# Patient Record
Sex: FEMALE | Race: WHITE | Employment: UNEMPLOYED | URBAN - METROPOLITAN AREA
[De-identification: names, ages, dates, MRNs, and addresses within clinical notes are randomized per-mention and may not be internally consistent; named-entity substitution may affect disease eponyms.]

---

## 2018-08-13 ENCOUNTER — APPOINTMENT (OUTPATIENT)
Dept: CARDIAC CATH/INVASIVE PROCEDURES | Age: 78
DRG: 246 | End: 2018-08-13
Payer: MEDICARE

## 2018-08-13 ENCOUNTER — APPOINTMENT (OUTPATIENT)
Dept: GENERAL RADIOLOGY | Age: 78
DRG: 246 | End: 2018-08-13
Payer: MEDICARE

## 2018-08-13 ENCOUNTER — HOSPITAL ENCOUNTER (INPATIENT)
Age: 78
LOS: 4 days | Discharge: HOME HEALTH | DRG: 246 | End: 2018-08-17
Attending: EMERGENCY MEDICINE | Admitting: INTERNAL MEDICINE
Payer: MEDICARE

## 2018-08-13 DIAGNOSIS — I25.9 CHEST PAIN DUE TO MYOCARDIAL ISCHEMIA, UNSPECIFIED ISCHEMIC CHEST PAIN TYPE: ICD-10-CM

## 2018-08-13 DIAGNOSIS — R94.31 ST SEGMENT DEPRESSION: ICD-10-CM

## 2018-08-13 DIAGNOSIS — I48.0 PAROXYSMAL ATRIAL FIBRILLATION (HCC): ICD-10-CM

## 2018-08-13 DIAGNOSIS — I21.4 NSTEMI (NON-ST ELEVATED MYOCARDIAL INFARCTION) (HCC): Primary | ICD-10-CM

## 2018-08-13 DIAGNOSIS — R94.31 ST ELEVATION: ICD-10-CM

## 2018-08-13 LAB
A/G RATIO: 1.6 (ref 1.1–2.2)
ABO/RH: NORMAL
ALBUMIN SERPL-MCNC: 3.8 G/DL (ref 3.4–5)
ALP BLD-CCNC: 101 U/L (ref 40–129)
ALT SERPL-CCNC: 9 U/L (ref 10–40)
ANION GAP SERPL CALCULATED.3IONS-SCNC: 16 MMOL/L (ref 3–16)
ANTIBODY SCREEN: NORMAL
APTT: 37.4 SEC (ref 26–36)
AST SERPL-CCNC: 14 U/L (ref 15–37)
BASOPHILS ABSOLUTE: 0.1 K/UL (ref 0–0.2)
BASOPHILS RELATIVE PERCENT: 0.8 %
BILIRUB SERPL-MCNC: 0.7 MG/DL (ref 0–1)
BUN BLDV-MCNC: 52 MG/DL (ref 7–20)
CALCIUM SERPL-MCNC: 10 MG/DL (ref 8.3–10.6)
CHLORIDE BLD-SCNC: 99 MMOL/L (ref 99–110)
CO2: 26 MMOL/L (ref 21–32)
CREAT SERPL-MCNC: 6.1 MG/DL (ref 0.6–1.2)
EKG ATRIAL RATE: 111 BPM
EKG ATRIAL RATE: 90 BPM
EKG DIAGNOSIS: NORMAL
EKG DIAGNOSIS: NORMAL
EKG P AXIS: 37 DEGREES
EKG P-R INTERVAL: 162 MS
EKG Q-T INTERVAL: 338 MS
EKG Q-T INTERVAL: 374 MS
EKG QRS DURATION: 92 MS
EKG QRS DURATION: 94 MS
EKG QTC CALCULATION (BAZETT): 451 MS
EKG QTC CALCULATION (BAZETT): 457 MS
EKG R AXIS: -25 DEGREES
EKG R AXIS: -28 DEGREES
EKG T AXIS: 112 DEGREES
EKG T AXIS: 145 DEGREES
EKG VENTRICULAR RATE: 107 BPM
EKG VENTRICULAR RATE: 90 BPM
EOSINOPHILS ABSOLUTE: 0.7 K/UL (ref 0–0.6)
EOSINOPHILS RELATIVE PERCENT: 8.6 %
GFR AFRICAN AMERICAN: 8
GFR NON-AFRICAN AMERICAN: 7
GLOBULIN: 2.4 G/DL
GLUCOSE BLD-MCNC: 147 MG/DL (ref 70–99)
GLUCOSE BLD-MCNC: 90 MG/DL (ref 70–99)
HCT VFR BLD CALC: 30.2 % (ref 36–48)
HEMOGLOBIN: 10.2 G/DL (ref 12–16)
INR BLD: 1.8 (ref 0.86–1.14)
LYMPHOCYTES ABSOLUTE: 0.4 K/UL (ref 1–5.1)
LYMPHOCYTES RELATIVE PERCENT: 5 %
MAGNESIUM: 2.3 MG/DL (ref 1.8–2.4)
MCH RBC QN AUTO: 32.1 PG (ref 26–34)
MCHC RBC AUTO-ENTMCNC: 33.6 G/DL (ref 31–36)
MCV RBC AUTO: 95.3 FL (ref 80–100)
MONOCYTES ABSOLUTE: 0.6 K/UL (ref 0–1.3)
MONOCYTES RELATIVE PERCENT: 7.4 %
NEUTROPHILS ABSOLUTE: 6.8 K/UL (ref 1.7–7.7)
NEUTROPHILS RELATIVE PERCENT: 78.2 %
PDW BLD-RTO: 14 % (ref 12.4–15.4)
PERFORMED ON: NORMAL
PLATELET # BLD: 183 K/UL (ref 135–450)
PMV BLD AUTO: 9.3 FL (ref 5–10.5)
POC ACT LR: 161 SEC
POC ACT LR: >400 SEC
POTASSIUM SERPL-SCNC: 5.4 MMOL/L (ref 3.5–5.1)
PROTHROMBIN TIME: 20.5 SEC (ref 9.8–13)
RBC # BLD: 3.17 M/UL (ref 4–5.2)
SODIUM BLD-SCNC: 141 MMOL/L (ref 136–145)
TOTAL PROTEIN: 6.2 G/DL (ref 6.4–8.2)
TROPONIN: 0.04 NG/ML
WBC # BLD: 8.7 K/UL (ref 4–11)

## 2018-08-13 PROCEDURE — 36415 COLL VENOUS BLD VENIPUNCTURE: CPT

## 2018-08-13 PROCEDURE — 99291 CRITICAL CARE FIRST HOUR: CPT | Performed by: INTERNAL MEDICINE

## 2018-08-13 PROCEDURE — B2111ZZ FLUOROSCOPY OF MULTIPLE CORONARY ARTERIES USING LOW OSMOLAR CONTRAST: ICD-10-PCS | Performed by: INTERNAL MEDICINE

## 2018-08-13 PROCEDURE — C1725 CATH, TRANSLUMIN NON-LASER: HCPCS

## 2018-08-13 PROCEDURE — 86900 BLOOD TYPING SEROLOGIC ABO: CPT

## 2018-08-13 PROCEDURE — 2580000003 HC RX 258: Performed by: INTERNAL MEDICINE

## 2018-08-13 PROCEDURE — C1769 GUIDE WIRE: HCPCS

## 2018-08-13 PROCEDURE — 93005 ELECTROCARDIOGRAM TRACING: CPT | Performed by: EMERGENCY MEDICINE

## 2018-08-13 PROCEDURE — 86850 RBC ANTIBODY SCREEN: CPT

## 2018-08-13 PROCEDURE — 2060000000 HC ICU INTERMEDIATE R&B

## 2018-08-13 PROCEDURE — 96374 THER/PROPH/DIAG INJ IV PUSH: CPT

## 2018-08-13 PROCEDURE — 6370000000 HC RX 637 (ALT 250 FOR IP): Performed by: INTERNAL MEDICINE

## 2018-08-13 PROCEDURE — 85347 COAGULATION TIME ACTIVATED: CPT

## 2018-08-13 PROCEDURE — 71045 X-RAY EXAM CHEST 1 VIEW: CPT

## 2018-08-13 PROCEDURE — 80074 ACUTE HEPATITIS PANEL: CPT

## 2018-08-13 PROCEDURE — 6360000002 HC RX W HCPCS

## 2018-08-13 PROCEDURE — 96375 TX/PRO/DX INJ NEW DRUG ADDON: CPT

## 2018-08-13 PROCEDURE — 6360000002 HC RX W HCPCS: Performed by: EMERGENCY MEDICINE

## 2018-08-13 PROCEDURE — 83735 ASSAY OF MAGNESIUM: CPT

## 2018-08-13 PROCEDURE — 85610 PROTHROMBIN TIME: CPT

## 2018-08-13 PROCEDURE — 99153 MOD SED SAME PHYS/QHP EA: CPT

## 2018-08-13 PROCEDURE — 4A023N7 MEASUREMENT OF CARDIAC SAMPLING AND PRESSURE, LEFT HEART, PERCUTANEOUS APPROACH: ICD-10-PCS | Performed by: INTERNAL MEDICINE

## 2018-08-13 PROCEDURE — 2709999900 HC NON-CHARGEABLE SUPPLY

## 2018-08-13 PROCEDURE — 93458 L HRT ARTERY/VENTRICLE ANGIO: CPT

## 2018-08-13 PROCEDURE — 93458 L HRT ARTERY/VENTRICLE ANGIO: CPT | Performed by: INTERNAL MEDICINE

## 2018-08-13 PROCEDURE — 2580000003 HC RX 258: Performed by: EMERGENCY MEDICINE

## 2018-08-13 PROCEDURE — 99152 MOD SED SAME PHYS/QHP 5/>YRS: CPT

## 2018-08-13 PROCEDURE — 90937 HEMODIALYSIS REPEATED EVAL: CPT

## 2018-08-13 PROCEDURE — 94761 N-INVAS EAR/PLS OXIMETRY MLT: CPT

## 2018-08-13 PROCEDURE — 02703ZZ DILATION OF CORONARY ARTERY, ONE ARTERY, PERCUTANEOUS APPROACH: ICD-10-PCS | Performed by: INTERNAL MEDICINE

## 2018-08-13 PROCEDURE — 85730 THROMBOPLASTIN TIME PARTIAL: CPT

## 2018-08-13 PROCEDURE — C1887 CATHETER, GUIDING: HCPCS

## 2018-08-13 PROCEDURE — 86901 BLOOD TYPING SEROLOGIC RH(D): CPT

## 2018-08-13 PROCEDURE — 80053 COMPREHEN METABOLIC PANEL: CPT

## 2018-08-13 PROCEDURE — 84443 ASSAY THYROID STIM HORMONE: CPT

## 2018-08-13 PROCEDURE — 2700000000 HC OXYGEN THERAPY PER DAY

## 2018-08-13 PROCEDURE — C1894 INTRO/SHEATH, NON-LASER: HCPCS

## 2018-08-13 PROCEDURE — 84484 ASSAY OF TROPONIN QUANT: CPT

## 2018-08-13 PROCEDURE — 85025 COMPLETE CBC W/AUTO DIFF WBC: CPT

## 2018-08-13 PROCEDURE — B2151ZZ FLUOROSCOPY OF LEFT HEART USING LOW OSMOLAR CONTRAST: ICD-10-PCS | Performed by: INTERNAL MEDICINE

## 2018-08-13 PROCEDURE — 99223 1ST HOSP IP/OBS HIGH 75: CPT | Performed by: THORACIC SURGERY (CARDIOTHORACIC VASCULAR SURGERY)

## 2018-08-13 PROCEDURE — 99152 MOD SED SAME PHYS/QHP 5/>YRS: CPT | Performed by: INTERNAL MEDICINE

## 2018-08-13 PROCEDURE — 6370000000 HC RX 637 (ALT 250 FOR IP): Performed by: EMERGENCY MEDICINE

## 2018-08-13 PROCEDURE — 99285 EMERGENCY DEPT VISIT HI MDM: CPT

## 2018-08-13 RX ORDER — INSULIN GLARGINE 100 [IU]/ML
12 INJECTION, SOLUTION SUBCUTANEOUS NIGHTLY
Status: DISCONTINUED | OUTPATIENT
Start: 2018-08-13 | End: 2018-08-17 | Stop reason: HOSPADM

## 2018-08-13 RX ORDER — ONDANSETRON 2 MG/ML
4 INJECTION INTRAMUSCULAR; INTRAVENOUS ONCE
Status: COMPLETED | OUTPATIENT
Start: 2018-08-13 | End: 2018-08-13

## 2018-08-13 RX ORDER — SODIUM CHLORIDE 0.9 % (FLUSH) 0.9 %
10 SYRINGE (ML) INJECTION PRN
Status: DISCONTINUED | OUTPATIENT
Start: 2018-08-13 | End: 2018-08-16 | Stop reason: SDUPTHER

## 2018-08-13 RX ORDER — ASPIRIN 81 MG/1
324 TABLET, CHEWABLE ORAL ONCE
Status: COMPLETED | OUTPATIENT
Start: 2018-08-13 | End: 2018-08-13

## 2018-08-13 RX ORDER — ATORVASTATIN CALCIUM 80 MG/1
80 TABLET, FILM COATED ORAL DAILY
Status: DISCONTINUED | OUTPATIENT
Start: 2018-08-13 | End: 2018-08-17 | Stop reason: HOSPADM

## 2018-08-13 RX ORDER — CINACALCET 30 MG/1
30 TABLET, FILM COATED ORAL DAILY
COMMUNITY

## 2018-08-13 RX ORDER — NICOTINE POLACRILEX 4 MG
15 LOZENGE BUCCAL PRN
Status: DISCONTINUED | OUTPATIENT
Start: 2018-08-13 | End: 2018-08-17 | Stop reason: HOSPADM

## 2018-08-13 RX ORDER — CINACALCET 30 MG/1
30 TABLET, FILM COATED ORAL DAILY
Status: DISCONTINUED | OUTPATIENT
Start: 2018-08-13 | End: 2018-08-17 | Stop reason: HOSPADM

## 2018-08-13 RX ORDER — HEPARIN SODIUM 5000 [USP'U]/ML
5000 INJECTION, SOLUTION INTRAVENOUS; SUBCUTANEOUS EVERY 8 HOURS SCHEDULED
Status: DISCONTINUED | OUTPATIENT
Start: 2018-08-14 | End: 2018-08-16

## 2018-08-13 RX ORDER — SEVELAMER CARBONATE 800 MG/1
800 TABLET, FILM COATED ORAL 2 TIMES DAILY WITH MEALS
Status: DISCONTINUED | OUTPATIENT
Start: 2018-08-13 | End: 2018-08-17 | Stop reason: HOSPADM

## 2018-08-13 RX ORDER — DEXTROSE MONOHYDRATE 50 MG/ML
100 INJECTION, SOLUTION INTRAVENOUS PRN
Status: DISCONTINUED | OUTPATIENT
Start: 2018-08-13 | End: 2018-08-17 | Stop reason: HOSPADM

## 2018-08-13 RX ORDER — 0.9 % SODIUM CHLORIDE 0.9 %
250 INTRAVENOUS SOLUTION INTRAVENOUS ONCE
Status: COMPLETED | OUTPATIENT
Start: 2018-08-13 | End: 2018-08-13

## 2018-08-13 RX ORDER — MORPHINE SULFATE 4 MG/ML
4 INJECTION, SOLUTION INTRAMUSCULAR; INTRAVENOUS ONCE
Status: COMPLETED | OUTPATIENT
Start: 2018-08-13 | End: 2018-08-13

## 2018-08-13 RX ORDER — SODIUM CHLORIDE 0.9 % (FLUSH) 0.9 %
10 SYRINGE (ML) INJECTION EVERY 12 HOURS SCHEDULED
Status: DISCONTINUED | OUTPATIENT
Start: 2018-08-13 | End: 2018-08-16 | Stop reason: SDUPTHER

## 2018-08-13 RX ORDER — ONDANSETRON 2 MG/ML
4 INJECTION INTRAMUSCULAR; INTRAVENOUS EVERY 6 HOURS PRN
Status: DISCONTINUED | OUTPATIENT
Start: 2018-08-13 | End: 2018-08-17 | Stop reason: HOSPADM

## 2018-08-13 RX ORDER — HYDROXYZINE PAMOATE 25 MG/1
25 CAPSULE ORAL NIGHTLY PRN
Status: DISCONTINUED | OUTPATIENT
Start: 2018-08-14 | End: 2018-08-17 | Stop reason: HOSPADM

## 2018-08-13 RX ORDER — ACETAMINOPHEN 325 MG/1
650 TABLET ORAL EVERY 4 HOURS PRN
Status: DISCONTINUED | OUTPATIENT
Start: 2018-08-13 | End: 2018-08-17 | Stop reason: HOSPADM

## 2018-08-13 RX ORDER — UREA 10 %
3 LOTION (ML) TOPICAL NIGHTLY PRN
Status: DISCONTINUED | OUTPATIENT
Start: 2018-08-14 | End: 2018-08-17 | Stop reason: HOSPADM

## 2018-08-13 RX ORDER — ATORVASTATIN CALCIUM 80 MG/1
80 TABLET, FILM COATED ORAL DAILY
Status: ON HOLD | COMMUNITY
End: 2018-08-17 | Stop reason: HOSPADM

## 2018-08-13 RX ORDER — PANTOPRAZOLE SODIUM 40 MG/1
40 TABLET, DELAYED RELEASE ORAL DAILY
Status: DISCONTINUED | OUTPATIENT
Start: 2018-08-13 | End: 2018-08-17 | Stop reason: HOSPADM

## 2018-08-13 RX ORDER — LOSARTAN POTASSIUM 25 MG/1
25 TABLET ORAL DAILY
Status: DISCONTINUED | OUTPATIENT
Start: 2018-08-13 | End: 2018-08-17 | Stop reason: HOSPADM

## 2018-08-13 RX ORDER — DEXTROSE MONOHYDRATE 25 G/50ML
12.5 INJECTION, SOLUTION INTRAVENOUS PRN
Status: DISCONTINUED | OUTPATIENT
Start: 2018-08-13 | End: 2018-08-17 | Stop reason: HOSPADM

## 2018-08-13 RX ORDER — LOSARTAN POTASSIUM 25 MG/1
25 TABLET ORAL DAILY
Status: ON HOLD | COMMUNITY
End: 2018-08-17 | Stop reason: HOSPADM

## 2018-08-13 RX ADMIN — MORPHINE SULFATE 4 MG: 4 INJECTION INTRAVENOUS at 07:17

## 2018-08-13 RX ADMIN — ACETAMINOPHEN 650 MG: 325 TABLET, FILM COATED ORAL at 17:31

## 2018-08-13 RX ADMIN — SODIUM CHLORIDE, PRESERVATIVE FREE 10 ML: 5 INJECTION INTRAVENOUS at 22:20

## 2018-08-13 RX ADMIN — ONDANSETRON 4 MG: 2 INJECTION INTRAMUSCULAR; INTRAVENOUS at 07:18

## 2018-08-13 RX ADMIN — SODIUM CHLORIDE 250 ML: 9 INJECTION, SOLUTION INTRAVENOUS at 07:18

## 2018-08-13 RX ADMIN — PANTOPRAZOLE SODIUM 40 MG: 40 TABLET, DELAYED RELEASE ORAL at 22:20

## 2018-08-13 RX ADMIN — LOSARTAN POTASSIUM 25 MG: 25 TABLET, FILM COATED ORAL at 22:19

## 2018-08-13 RX ADMIN — NITROGLYCERIN 1 INCH: 20 OINTMENT TOPICAL at 07:17

## 2018-08-13 RX ADMIN — ASPIRIN 81 MG 324 MG: 81 TABLET ORAL at 07:18

## 2018-08-13 RX ADMIN — INSULIN GLARGINE 12 UNITS: 100 INJECTION, SOLUTION SUBCUTANEOUS at 22:19

## 2018-08-13 RX ADMIN — ATORVASTATIN CALCIUM 80 MG: 80 TABLET, FILM COATED ORAL at 22:19

## 2018-08-13 ASSESSMENT — ENCOUNTER SYMPTOMS
BACK PAIN: 1
VOMITING: 0
CHEST TIGHTNESS: 1
COLOR CHANGE: 0
NAUSEA: 1
DIARRHEA: 0
ABDOMINAL PAIN: 0
SHORTNESS OF BREATH: 1

## 2018-08-13 ASSESSMENT — PAIN SCALES - GENERAL
PAINLEVEL_OUTOF10: 5
PAINLEVEL_OUTOF10: 8

## 2018-08-13 NOTE — PROGRESS NOTES
Patient admitted to room 439 from the cath lab. Patient's VSS, and R groin cath site is WNL, no redness, swelling or bleeding noted at this time. Patient oriented to room and unit. Family at bedside. Valuables given to patient's son to take home with him. Will continue to monitor.

## 2018-08-13 NOTE — CONSULTS
Department of Cardiovascular & Thoracic Surgery  History and Physical          DIAGNOSIS:  CAD with atypical angina. NSTEMI    CHIEF COMPLAINT:    Chief Complaint   Patient presents with    Shortness of Breath     Dialysis patient due for dialysis today. Woke up approx. 330am with SOB with low back pain and diaporesis. Nausea with no emesis, Sats 82% on RA upon arrival       History Obtained From:  patient, electronic medical record    HISTORY OF PRESENT ILLNESS:      The patient is a [de-identified] y.o. female with significant past medical history of DM, HTN and atrial fibrillation who presents with burning in her upper back and numbness down both arms. No chest pain. She developed diaphoresis without fever and then nausea and vomiting. These symptoms woke her up this am so she came to the ED where evaluation has shown EKG and enzyme changes c/w NSTEMI. LHC shows multi-vessel disease not amenable to PCI so I have been asked to see her regarding CABG surgery. Past Medical History:        Diagnosis Date    [facility]     Diabetes mellitus ([facility] [facility]     GERD (gastroesophageal reflux disease)     Hypertension     Renal failure (ARF), acute on chronic (HCC)     Thyroid disease     thyroid removed.        Past Surgical History:        Procedure Laterality Date    CHOLECYSTECTOMY      DIALYSIS FISTULA CREATION      left upper arm    EYE SURGERY      cataracts, retinal detatched, fluid removal    THYROID SURGERY         Medications Prior to Admission:   Prescriptions Prior to Admission: cinacalcet (SENSIPAR) 30 MG tablet, Take 30 mg by mouth daily  losartan (COZAAR) 25 MG tablet, Take 25 mg by mouth daily  atorvastatin (LIPITOR) 80 MG tablet, Take 80 mg by mouth daily  insulin glargine (LANTUS) 100 UNIT/ML injection vial, Inject 12 Units into the skin nightly   hydrALAZINE (APRESOLINE) 25 MG tablet, Take 50 mg by mouth 3 times daily 50 in am, 25 in afternoon and 50 in pm  sevelamer (RENVELA) 800 MG tablet, Take 1 tablet
Patient seen and examined, consult note dictated. Assessment and Plan:    1- ESRD: We will arrange for hemodialysis today per [facility] schedule. 2- Hyperkalemia: Mild, will treat medically pending dialysis as above. 3- HTN: Blood pressure within acceptable range. 4- Anemia: Stable hemoglobin, monitor. 5- CAD: Management per cardiology and CTS.
APPEARANCE:  The patient is alert and oriented x3, not in acute  distress. HEENT:  Eyes revealed normal conjunctivae and reactive pupils. NECK:  Revealed midline trachea and nonpalpable thyroid. LUNGS:  Clear to auscultation bilaterally, nonlabored breathing. CARDIOVASCULAR EXAM:  Revealed S1 and S2, regular rate and rhythm. No  murmurs or rubs. No peripheral edema. ABDOMINAL EXAM:  Revealed soft abdomen. No organomegaly. SKIN:  Revealed no lesions or rashes. Warm to touch. PSYCHIATRIC:  Revealed good judgment and insight. LYMPHATICS:  Revealed no cervical or axillary adenopathies. ASSESSMENT AND PLAN:  1. End-stage renal disease. We will arrange for hemodialysis today per  Monday, Wednesday, Friday schedule. 2.  Hyperkalemia mild, we will treat medically pending dialysis as above. 3.  Hypertension. Blood pressure within acceptable range. 4.  Anemia. Stable hemoglobin, monitor. 5.  Coronary artery disease, management per cardiology and cardiothoracic  surgery.         Trudy Araujo MD    D: 08/13/2018 15:03:36       T: 08/13/2018 15:05:26     [email or web address]  Job#: [id number]     Doc#: [id number]    CC:

## 2018-08-13 NOTE — ED PROVIDER NOTES
[address] [address]  eMERGENCY dEPARTMENT eNCOUnter        Pt Name: Emma Do  MRN: [id number]  Gretta 1940  Date of evaluation: 8/13/2018  Provider: Adriana Marques MD  PCP: Louis Phipps MD      [address] [place] [address]       Chief Complaint   Patient presents with    Shortness of Breath     Dialysis patient due for dialysis today. Woke up approx. 330am with SOB with low back pain and diaporesis. Nausea with no emesis, Sats 82% on RA upon arrival       HISTORY OF PRESENT ILLNESS   (Location/Symptom, Timing/Onset, Context/Setting, Quality, Duration, Modifying Factors, Severity)  Note limiting factors. Emma Do is a [de-identified] y.o. female presenting today due to concern for chest pain and pressure that goes into her back and down both of her arms since 3:30 AM associated with shortness of breath. She was supposed to go to dialysis this morning but due to the pain was unable to attend. She is complaining about diaphoresis as well. Denies any recent cardiac evaluation. She does have a history of atrial fibrillation along with diabetes and hypertension. No recent stress test or catheterization. Nothing is currently helping with her pain. REVIEW OF SYSTEMS    (2-9 systems for level 4, 10 or more for level 5)     Review of Systems   Constitutional: Positive for diaphoresis and fatigue. Negative for chills and fever. HENT: Negative for congestion. Respiratory: Positive for chest tightness and shortness of breath. Cardiovascular: Positive for chest pain and leg swelling. Negative for palpitations. Gastrointestinal: Positive for nausea. Negative for abdominal pain, diarrhea and vomiting. Genitourinary: Negative for difficulty urinating. Musculoskeletal: Positive for back pain, myalgias (both arms) and neck pain. Negative for neck stiffness. Skin: Negative for color change. Neurological: Positive for weakness (generalized).  Negative for dizziness, light-headedness, numbness and (*)     All other components within normal limits    Narrative:     Performed at:  [address], [facility] [place]   Phone [phone]   MAGNESIUM    Narrative:     Richardjuan Bruner. [phone],  Previous panic on this admission - call not needed per SOP, 08/13/2018 07:44,  by [facility]  Performed at:  [address], [facility] [place]   Phone [phone]   HEPATITIS PANEL, ACUTE    Narrative:     Performed at:  [facility]  [address] [facility] [employer] [address]   Phone [phone]   TSH WITH REFLEX TO FT4    Narrative:     Performed at:  [facility]  [address] [facility] [employer] [address]   Phone [phone]   POC ACT-LR    Narrative:     Performed at:  [address], [facility] [place]   Phone [phone]   POC ACT-LR    Narrative:     Performed at:  [address], [facility] [place]   Phone [phone]   POCT GLUCOSE    Narrative:     Performed at:  [place] [address] [place]   Phone [phone]   POCT GLUCOSE    Narrative:     Performed at:  [address], [facility] [place]   Phone [phone]   POCT GLUCOSE    Narrative:     Performed at:  [place] [address] [place]   Phone [phone]   POCT GLUCOSE   POCT GLUCOSE   POCT GLUCOSE   POCT GLUCOSE   POCT GLUCOSE   POCT GLUCOSE   POCT GLUCOSE   POCT GLUCOSE   TYPE AND SCREEN    Narrative:     Performed at:  [place] [address] [place]   Phone [phone]       All other labs were within normal range or not returned as of this dictation. EKG:  All 90 75 67   Resp: 18 20 18 18   Temp: 98 °F (36.7 °C) 98.1 °F (36.7 °C) 98.1 °F (36.7 °C) 97.9 °F (36.6 °C)   TempSrc: Oral Oral Oral Oral   SpO2: 97% 97% 96% 96%   Weight: 156 lb (70.8 kg)      Height:           Patient was given the following medications:  Medications   sodium chloride flush 0.9 % injection 10 mL (10 mLs Intravenous Given 8/14/18 0912)   sodium chloride flush 0.9 % injection 10 mL (not administered)   acetaminophen (TYLENOL) tablet 650 mg (650 mg Oral Given 8/14/18 0037)   magnesium hydroxide (MILK OF MAGNESIA) 400 MG/5ML suspension 30 mL (not administered)   ondansetron (ZOFRAN) injection 4 mg (not administered)   atorvastatin (LIPITOR) tablet 80 mg (80 mg Oral Given 8/14/18 0912)   cinacalcet (SENSIPAR) tablet 30 mg (30 mg Oral Given 8/14/18 0911)   insulin glargine (LANTUS) injection vial 12 Units (12 Units Subcutaneous Given 8/13/18 2219)   losartan (COZAAR) tablet 25 mg (25 mg Oral Given 8/14/18 0912)   sevelamer (RENVELA) tablet 800 mg (800 mg Oral Given 8/14/18 0912)   pantoprazole (PROTONIX) tablet 40 mg (40 mg Oral Given 8/14/18 0912)   insulin lispro (HUMALOG) injection vial 0-6 Units (0 Units Subcutaneous Not Given 8/14/18 1217)   insulin lispro (HUMALOG) injection vial 0-3 Units (1 Units Subcutaneous Not Given 8/13/18 2220)   glucose (GLUTOSE) 40 % oral gel 15 g (not administered)   dextrose 50 % solution 12.5 g (not administered)   glucagon (rDNA) injection 1 mg (not administered)   dextrose 5 % solution (not administered)   heparin (porcine) injection 5,000 Units (5,000 Units Subcutaneous Given 8/14/18 1318)   melatonin tablet 3 mg (not administered)   hydrOXYzine (VISTARIL) capsule 25 mg (not administered)   clopidogrel (PLAVIX) tablet 600 mg (not administered)   aspirin chewable tablet 324 mg (324 mg Oral Given 8/13/18 0718)   nitroglycerin (NITRO-BID) 2 % ointment 1 inch (1 inch Topical Given 8/13/18 0717)   morphine (PF) injection 4 mg (4 mg Intravenous Given 8/13/18 0717) ondansetron [facility]) injection 4 mg (4 mg Intravenous Given 8/13/18 0718)   0.9 % sodium chloride bolus (0 mLs Intravenous Stopped 8/13/18 1523)   ioversol (OPTIRAY) 74 % injection 158 mL (158 mLs Intravenous Given by Other 8/13/18 0971)   perflutren lipid microspheres (DEFINITY) injection 1.65 mg (1.65 mg Intravenous Given 8/14/18 1634)     Patient was evaluated due to concerning story for unstable angina. Troponin was mildly elevated which may be due to kidney failure but based on story I am concerned about NSTEMI. EKG also had concerning features for possible STEMI. Cardiology felt this was more likely NSTEMI at this time but do plan to take her urgently to the Cath Lab. Story not suggestive of pulmonary embolism and she is on Coumadin making this less likely. She will need further evaluation the hospital.  Please see cardiology's note for further disposition of patient. The patient tolerated their visit well. The patient and / or the family were informed of the results of any tests, a time was given to answer questions. FINAL IMPRESSION      1. NSTEMI (non-ST elevated myocardial infarction) ([address]    2. ST segment depression    3. ST elevation    4.  Chest pain due to myocardial ischemia, unspecified ischemic chest pain type          DISPOSITION/PLAN   DISPOSITION  - decision to admit       PATIENT REFERRED TO:  MD Damien Natarajan [phone]            DISCHARGE MEDICATIONS:  Current Discharge Medication List          DISCONTINUED MEDICATIONS:  Current Discharge Medication List      STOP taking these medications       LISINOPRIL PO Comments:   Reason for Stopping:                      (Please note that portions of this note were completed with a voice recognition program.  Efforts were made to edit the dictations but occasionally words are mis-transcribed.)    Laverne Meier MD (electronically signed)            Laverne Meier MD  08/14/18 [id number]

## 2018-08-13 NOTE — PLAN OF CARE
Brief Pre-Op Note/Sedation Assessment      Geni Velez  1940  Cath Pool Rm/NONE  [id number]  11:12 AM    Planned Procedure: Cardiac Catheterization Procedure    Post Procedure Plan: Return to same level of care    Consent: I have discussed with the patient and/or the patient representative the indication, alternatives, and the possible risks and/or complications of the planned procedure and the anesthesia methods. The patient and/or patient representative appear to understand and agree to proceed. Chief Complaint: NSTEMI  Dyspnea on Exertion      Indications for the Procedure:   CAD Presentation:  ACS <= 24 hrs, New Onset Angina <= 2 months and Suspected CAD  Anginal Classification within 2 weeks:  CCS IV - Inability to perform any activity without angina or angina at rest, i.e., severe limitation  NYHA Heart Failure Class within 2 weeks: Class III - Symptoms of HF on less-than-ordinary exertion, Newly Diagnosed? Yes, Heart Failure Type: Unknown      Anti- Anginal Meds within 2 weeks:   ANTI-ANGINAL MEDS: Yes: Ca Channel Blockers      Stress or Imaging Studies Performed:  None    Vital Signs:  BP (!) 154/57   Pulse 92   Temp 97.7 °F (36.5 °C) (Oral)   Resp 21   Ht 5' 3\" (1.6 m)   Wt 163 lb (73.9 kg)   SpO2 95%   BMI 28.87 kg/m²     Allergies:  No Known Allergies    Past Medical History:  Past Medical History:   Diagnosis Date    [facility] [address]     Diabetes mellitus ([facility] [address]     GERD (gastroesophageal reflux disease)     Hypertension     Renal failure (ARF), acute on chronic (HCC)     Thyroid disease     thyroid removed. Surgical History:  Past Surgical History:   Procedure Laterality Date    CHOLECYSTECTOMY      DIALYSIS FISTULA CREATION      left upper arm    EYE SURGERY      cataracts, retinal detatched, fluid removal    THYROID SURGERY           Medications:  No current facility-administered medications for this encounter.             Pre-Sedation:    Pre-Sedation Documentation and Exam:  I have personally completed a history, physical exam & review of systems for this patient (see notes). Prior History of Anesthesia Complications:   none    Modified Mallampati:  II (soft palate, uvula, fauces visible)    ASA Classification:  Class 3 - A patient with severe systemic disease that limits activity but is not incapacitating    Theo Scale: Activity:  2 - Able to move 4 extremities voluntarily on command  Respiration:  2 - Able to breathe deeply and cough freely  Circulation:  2 - BP+/- 20mmHg of normal  Consciousness:  2 - Fully awake  Oxygen Saturation (color):  2 - Able to maintain oxygen saturation >92% on room air    Sedation/Anesthesia Plan:  Guard the patient's safety and welfare. Minimize physical discomfort and pain. Minimize negative psychological responses to treatment by providing sedation and analgesia and maximize the potential amnesia. Patient to meet pre-procedure discharge plan.     Medication Planned:  midazolam intravenously, fentanyl intravenously    Patient is an appropriate candidate for plan of sedation: yes      Electronically signed by Isaac Damon MD on 8/13/2018 at 11:12 AM

## 2018-08-13 NOTE — ED NOTES
Patient placed on 4L per NC due to Sats @ 82%. Patient sats now @ 96% with O2.       Araceli Ndiaye RN  08/13/18 1382

## 2018-08-14 LAB
ANION GAP SERPL CALCULATED.3IONS-SCNC: 14 MMOL/L (ref 3–16)
BUN BLDV-MCNC: 30 MG/DL (ref 7–20)
CALCIUM SERPL-MCNC: 9.3 MG/DL (ref 8.3–10.6)
CHLORIDE BLD-SCNC: 95 MMOL/L (ref 99–110)
CO2: 25 MMOL/L (ref 21–32)
CREAT SERPL-MCNC: 4.3 MG/DL (ref 0.6–1.2)
GFR AFRICAN AMERICAN: 12
GFR NON-AFRICAN AMERICAN: 10
GLUCOSE BLD-MCNC: 133 MG/DL (ref 70–99)
GLUCOSE BLD-MCNC: 78 MG/DL (ref 70–99)
GLUCOSE BLD-MCNC: 89 MG/DL (ref 70–99)
GLUCOSE BLD-MCNC: 90 MG/DL (ref 70–99)
GLUCOSE BLD-MCNC: 95 MG/DL (ref 70–99)
HAV IGM SER IA-ACNC: NORMAL
HCT VFR BLD CALC: 26.8 % (ref 36–48)
HEMOGLOBIN: 9.2 G/DL (ref 12–16)
HEPATITIS B CORE IGM ANTIBODY: NORMAL
HEPATITIS B SURFACE ANTIGEN INTERPRETATION: NORMAL
HEPATITIS C ANTIBODY INTERPRETATION: NORMAL
LV EF: 43 %
LVEF MODALITY: NORMAL
MCH RBC QN AUTO: 32.4 PG (ref 26–34)
MCHC RBC AUTO-ENTMCNC: 34.2 G/DL (ref 31–36)
MCV RBC AUTO: 94.9 FL (ref 80–100)
PDW BLD-RTO: 14.1 % (ref 12.4–15.4)
PERFORMED ON: ABNORMAL
PERFORMED ON: NORMAL
PLATELET # BLD: 139 K/UL (ref 135–450)
PMV BLD AUTO: 9.2 FL (ref 5–10.5)
POTASSIUM SERPL-SCNC: 4.7 MMOL/L (ref 3.5–5.1)
RBC # BLD: 2.83 M/UL (ref 4–5.2)
SODIUM BLD-SCNC: 134 MMOL/L (ref 136–145)
TSH REFLEX FT4: 1.13 UIU/ML (ref 0.27–4.2)
WBC # BLD: 5.2 K/UL (ref 4–11)

## 2018-08-14 PROCEDURE — 6360000004 HC RX CONTRAST MEDICATION: Performed by: INTERNAL MEDICINE

## 2018-08-14 PROCEDURE — 6370000000 HC RX 637 (ALT 250 FOR IP): Performed by: STUDENT IN AN ORGANIZED HEALTH CARE EDUCATION/TRAINING PROGRAM

## 2018-08-14 PROCEDURE — G0238 OTH RESP PROC, INDIV: HCPCS

## 2018-08-14 PROCEDURE — 36415 COLL VENOUS BLD VENIPUNCTURE: CPT

## 2018-08-14 PROCEDURE — 80048 BASIC METABOLIC PNL TOTAL CA: CPT

## 2018-08-14 PROCEDURE — 99233 SBSQ HOSP IP/OBS HIGH 50: CPT | Performed by: INTERNAL MEDICINE

## 2018-08-14 PROCEDURE — 6370000000 HC RX 637 (ALT 250 FOR IP): Performed by: INTERNAL MEDICINE

## 2018-08-14 PROCEDURE — 85027 COMPLETE CBC AUTOMATED: CPT

## 2018-08-14 PROCEDURE — 2700000000 HC OXYGEN THERAPY PER DAY

## 2018-08-14 PROCEDURE — 6360000002 HC RX W HCPCS: Performed by: INTERNAL MEDICINE

## 2018-08-14 PROCEDURE — C8929 TTE W OR WO FOL WCON,DOPPLER: HCPCS | Performed by: INTERNAL MEDICINE

## 2018-08-14 PROCEDURE — 2580000003 HC RX 258: Performed by: INTERNAL MEDICINE

## 2018-08-14 PROCEDURE — 2060000000 HC ICU INTERMEDIATE R&B

## 2018-08-14 PROCEDURE — 94761 N-INVAS EAR/PLS OXIMETRY MLT: CPT

## 2018-08-14 RX ORDER — CLOPIDOGREL 300 MG/1
600 TABLET, FILM COATED ORAL ONCE
Status: COMPLETED | OUTPATIENT
Start: 2018-08-14 | End: 2018-08-14

## 2018-08-14 RX ADMIN — SODIUM CHLORIDE, PRESERVATIVE FREE 10 ML: 5 INJECTION INTRAVENOUS at 20:43

## 2018-08-14 RX ADMIN — SEVELAMER CARBONATE 800 MG: 800 TABLET, FILM COATED ORAL at 16:44

## 2018-08-14 RX ADMIN — HEPARIN SODIUM 5000 UNITS: 5000 INJECTION INTRAVENOUS; SUBCUTANEOUS at 13:18

## 2018-08-14 RX ADMIN — LOSARTAN POTASSIUM 25 MG: 25 TABLET, FILM COATED ORAL at 09:12

## 2018-08-14 RX ADMIN — ACETAMINOPHEN 650 MG: 325 TABLET, FILM COATED ORAL at 20:43

## 2018-08-14 RX ADMIN — CINACALCET HYDROCHLORIDE 30 MG: 30 TABLET, COATED ORAL at 09:11

## 2018-08-14 RX ADMIN — ACETAMINOPHEN 650 MG: 325 TABLET, FILM COATED ORAL at 00:37

## 2018-08-14 RX ADMIN — PANTOPRAZOLE SODIUM 40 MG: 40 TABLET, DELAYED RELEASE ORAL at 09:12

## 2018-08-14 RX ADMIN — HEPARIN SODIUM 5000 UNITS: 5000 INJECTION INTRAVENOUS; SUBCUTANEOUS at 04:42

## 2018-08-14 RX ADMIN — ATORVASTATIN CALCIUM 80 MG: 80 TABLET, FILM COATED ORAL at 09:12

## 2018-08-14 RX ADMIN — SEVELAMER CARBONATE 800 MG: 800 TABLET, FILM COATED ORAL at 09:12

## 2018-08-14 RX ADMIN — PERFLUTREN 1.65 MG: 6.52 INJECTION, SUSPENSION INTRAVENOUS at 16:34

## 2018-08-14 RX ADMIN — INSULIN GLARGINE 12 UNITS: 100 INJECTION, SOLUTION SUBCUTANEOUS at 20:43

## 2018-08-14 RX ADMIN — HEPARIN SODIUM 5000 UNITS: 5000 INJECTION INTRAVENOUS; SUBCUTANEOUS at 20:43

## 2018-08-14 RX ADMIN — CLOPIDOGREL BISULFATE 600 MG: 300 TABLET, FILM COATED ORAL at 16:51

## 2018-08-14 RX ADMIN — SODIUM CHLORIDE, PRESERVATIVE FREE 10 ML: 5 INJECTION INTRAVENOUS at 09:12

## 2018-08-14 ASSESSMENT — PAIN DESCRIPTION - DESCRIPTORS
DESCRIPTORS: ACHING
DESCRIPTORS: ACHING

## 2018-08-14 ASSESSMENT — PAIN SCALES - GENERAL
PAINLEVEL_OUTOF10: 5
PAINLEVEL_OUTOF10: 0
PAINLEVEL_OUTOF10: 8

## 2018-08-14 ASSESSMENT — PAIN DESCRIPTION - LOCATION
LOCATION: GENERALIZED
LOCATION: COCCYX

## 2018-08-14 ASSESSMENT — PAIN DESCRIPTION - PAIN TYPE
TYPE: ACUTE PAIN
TYPE: ACUTE PAIN

## 2018-08-14 NOTE — PROGRESS NOTES
CVTS Thoracic Progress Note:          CC: NSTEMI    Subj:  Poor sleep last evening, sitting in bed visiting with family    Obj:    Blood pressure 129/67, pulse 68, temperature 98 °F (36.7 °C), temperature source Oral, resp. rate 18, height 5' 3\" (1.6 m), weight 156 lb (70.8 kg), SpO2 97 %. Lungs diminished in 4 L NC   Abdomen soft, nontender     Diagnostics:   Recent Labs      08/13/18   0655   WBC  8.7   HGB  10.2*   HCT  30.2*   PLT  183                                                                  Recent Labs      08/13/18   0655   NA  141   K  5.4*   CL  99   CO2  26   BUN  52*   CREATININE  6.1*   GLUCOSE  147*          Recent Labs      08/13/18   0655   MG  2.30      Recent Labs      08/13/18   0655   TROPONINI  0.04*     Recent Labs      08/13/18   0655   INR  1.80*     Assess/Plan:   NSTEMI   Discussed risk with patient and Dr Jasper Price   PCI with Dr Jasper Price tomorrow   We will sign off, please call with questions. Cancel preop workup    Sharda Verma, PhD, [facility]  8/14/2018  8:57 AM  I have discussed with Dr. Jasper Price and he will proceed with PCI with which I agree. Note reviewed, events of last 24 hours reviewed along with vital signs and I/Os and patient examined. Assessment and plans discussed and are as outlined above.      Milad Us MD, FACS, [address] [facility], FACCP, [place]

## 2018-08-14 NOTE — PROGRESS NOTES
Pt anxious about CABG. Paged [employer], NP about getting an antianxiety on board. Awaiting response.

## 2018-08-14 NOTE — PROGRESS NOTES
50 mg by mouth 2 times daily    Historical Provider, MD   furosemide (LASIX) 40 MG tablet Take 80 mg by mouth 2 times daily     Historical Provider, MD LITTLE Complex-C-Folic Acid (FLAVIA CAPS PO) Take  by mouth daily. Historical Provider, MD   amlodipine (NORVASC) 10 MG tablet Take 10 mg by mouth nightly     Historical Provider, MD   warfarin (COUMADIN) 2 MG tablet Take 2 mg by mouth daily at 1800     Historical Provider, MD   Omeprazole (PRILOSEC PO) Take 20 mg by mouth daily     Historical Provider, MD        Allergies:  Patient has no known allergies. Review of Systems:   All 14 point review of symptoms completed. Pertinent positives identified in the HPI, all other review of symptoms negative as below.     Physical Examination:    Vitals:    08/14/18 1217   BP:    Pulse: 75   Resp: 18   Temp: 98.1 °F (36.7 °C)   SpO2: 96%    Weight: 156 lb (70.8 kg)         General Appearance:  Alert, cooperative, no distress, appears stated age   Head:  Normocephalic, without obvious abnormality, atraumatic   Eyes:  PERRL, conjunctiva/corneas clear       Nose: Nares normal, no drainage or sinus tenderness   Throat: Lips, mucosa, and tongue normal   Neck: Supple, symmetrical, trachea midline, no adenopathy, thyroid: not enlarged, symmetric, no tenderness/mass/nodules, no carotid bruit or JVD       Lungs:   Clear to auscultation bilaterally, respirations unlabored   Chest Wall:  No tenderness or deformity   Heart:  Regular rate and rhythm, S1, S2 normal, no murmur, rub or gallop   Abdomen:   Soft, non-tender, bowel sounds active all four quadrants,  no masses, no organomegaly           Extremities: Extremities normal, atraumatic, no cyanosis or edema   Pulses: 2+ and symmetric   Skin: Skin color, texture, turgor normal, no rashes or lesions   Pysch: Normal mood and affect   Neurologic: Normal gross motor and sensory exam.         Labs  CBC:   Lab Results   Component Value Date    WBC 5.2 08/14/2018    RBC 2.83 08/14/2018    HGB

## 2018-08-14 NOTE — PROGRESS NOTES
I spoke with Mrs. Oscar Brewer today regarding her experience in the Cath Lab yesterday. Her groin site is soft, without any bleeding or bruising today. She stated that the Cath Lab staff was friendly and accomodating. She is feeling relieved that we are going to do a PCI and not CABG to fix her stenosis. She will be returning tomorrow for this procedure.

## 2018-08-14 NOTE — CARE COORDINATION
CASE MANAGEMENT INITIAL ASSESSMENT      Reviewed chart and met with patient today, re: Possible d/c needs   Explained Case Management role/services. Family present: Pt's son Rico Littlejohn   Primary contact information: Pt's shamar Taylor [phone]    Admit date/status:  8/13/18     Insurance: Medicare   Precert required for SNF - N         3 night stay required - Y    Living arrangements, Adls, care needs, prior to admission: Pt lives at home alone     Transportation: Family     Durable Medical Equipment at home: Walker__Cane_X_RTS__ BSC__Shower Chair__  02__ HHN__ CPAP__  BiPap__  Hospital Bed__ W/C___ Other__________    Services in the home and/or outpatient, prior to admission: No HHC. Current HD pt M,W,F at the [facility]     PT/OT recs: Not ordered yet     Hospital Exemption Notification (HEN): Needed for SNF     Barriers to discharge: None     Plan/comments: 8/14/18 Pt interested in hhc per her request a referral was made to [facility]. Follow for PCI tomorrow and possible CABG if stenosis can not be fixed in the cath lab.      ECOC on chart for MD signature

## 2018-08-15 ENCOUNTER — APPOINTMENT (OUTPATIENT)
Dept: CARDIAC CATH/INVASIVE PROCEDURES | Age: 78
DRG: 246 | End: 2018-08-15
Payer: MEDICARE

## 2018-08-15 LAB
ANION GAP SERPL CALCULATED.3IONS-SCNC: 17 MMOL/L (ref 3–16)
BUN BLDV-MCNC: 45 MG/DL (ref 7–20)
CALCIUM SERPL-MCNC: 9.2 MG/DL (ref 8.3–10.6)
CHLORIDE BLD-SCNC: 96 MMOL/L (ref 99–110)
CO2: 23 MMOL/L (ref 21–32)
CREAT SERPL-MCNC: 5.7 MG/DL (ref 0.6–1.2)
GFR AFRICAN AMERICAN: 9
GFR NON-AFRICAN AMERICAN: 7
GLUCOSE BLD-MCNC: 111 MG/DL (ref 70–99)
GLUCOSE BLD-MCNC: 118 MG/DL (ref 70–99)
GLUCOSE BLD-MCNC: 61 MG/DL (ref 70–99)
GLUCOSE BLD-MCNC: 65 MG/DL (ref 70–99)
GLUCOSE BLD-MCNC: 73 MG/DL (ref 70–99)
GLUCOSE BLD-MCNC: 78 MG/DL (ref 70–99)
GLUCOSE BLD-MCNC: 80 MG/DL (ref 70–99)
GLUCOSE BLD-MCNC: 88 MG/DL (ref 70–99)
PERFORMED ON: ABNORMAL
PERFORMED ON: NORMAL
POC ACT LR: 311 SEC
POC ACT LR: 313 SEC
POC ACT LR: 377 SEC
POTASSIUM SERPL-SCNC: 5.2 MMOL/L (ref 3.5–5.1)
SODIUM BLD-SCNC: 136 MMOL/L (ref 136–145)

## 2018-08-15 PROCEDURE — 2700000000 HC OXYGEN THERAPY PER DAY

## 2018-08-15 PROCEDURE — 80048 BASIC METABOLIC PNL TOTAL CA: CPT

## 2018-08-15 PROCEDURE — 2500000003 HC RX 250 WO HCPCS

## 2018-08-15 PROCEDURE — C1887 CATHETER, GUIDING: HCPCS

## 2018-08-15 PROCEDURE — 36415 COLL VENOUS BLD VENIPUNCTURE: CPT

## 2018-08-15 PROCEDURE — 6370000000 HC RX 637 (ALT 250 FOR IP)

## 2018-08-15 PROCEDURE — 6370000000 HC RX 637 (ALT 250 FOR IP): Performed by: INTERNAL MEDICINE

## 2018-08-15 PROCEDURE — 92933 PRQ TRLML C ATHRC ST ANGIOP1: CPT | Performed by: INTERNAL MEDICINE

## 2018-08-15 PROCEDURE — 6360000002 HC RX W HCPCS: Performed by: INTERNAL MEDICINE

## 2018-08-15 PROCEDURE — 99024 POSTOP FOLLOW-UP VISIT: CPT | Performed by: INTERNAL MEDICINE

## 2018-08-15 PROCEDURE — 2580000003 HC RX 258

## 2018-08-15 PROCEDURE — 6360000002 HC RX W HCPCS

## 2018-08-15 PROCEDURE — 6360000004 HC RX CONTRAST MEDICATION: Performed by: INTERNAL MEDICINE

## 2018-08-15 PROCEDURE — 2709999900 HC NON-CHARGEABLE SUPPLY

## 2018-08-15 PROCEDURE — 85347 COAGULATION TIME ACTIVATED: CPT

## 2018-08-15 PROCEDURE — C1760 CLOSURE DEV, VASC: HCPCS

## 2018-08-15 PROCEDURE — 2580000003 HC RX 258: Performed by: INTERNAL MEDICINE

## 2018-08-15 PROCEDURE — 99153 MOD SED SAME PHYS/QHP EA: CPT

## 2018-08-15 PROCEDURE — 90937 HEMODIALYSIS REPEATED EVAL: CPT

## 2018-08-15 PROCEDURE — 027136Z DILATION OF CORONARY ARTERY, TWO ARTERIES WITH THREE DRUG-ELUTING INTRALUMINAL DEVICES, PERCUTANEOUS APPROACH: ICD-10-PCS | Performed by: INTERNAL MEDICINE

## 2018-08-15 PROCEDURE — C9602 PERC D-E COR STENT ATHER S: HCPCS

## 2018-08-15 PROCEDURE — C1724 CATH, TRANS ATHEREC,ROTATION: HCPCS

## 2018-08-15 PROCEDURE — 99152 MOD SED SAME PHYS/QHP 5/>YRS: CPT

## 2018-08-15 PROCEDURE — 2720000010 HC SURG SUPPLY STERILE

## 2018-08-15 PROCEDURE — C9601 PERC DRUG-EL COR STENT BRAN: HCPCS

## 2018-08-15 PROCEDURE — C1894 INTRO/SHEATH, NON-LASER: HCPCS

## 2018-08-15 PROCEDURE — C1725 CATH, TRANSLUMIN NON-LASER: HCPCS

## 2018-08-15 PROCEDURE — 2000000000 HC ICU R&B

## 2018-08-15 PROCEDURE — C1874 STENT, COATED/COV W/DEL SYS: HCPCS

## 2018-08-15 PROCEDURE — X2C1361 EXTIRPATION OF MATTER FROM CORONARY ARTERY, TWO ARTERIES USING ORBITAL ATHERECTOMY TECHNOLOGY, PERCUTANEOUS APPROACH, NEW TECHNOLOGY GROUP 1: ICD-10-PCS | Performed by: INTERNAL MEDICINE

## 2018-08-15 PROCEDURE — C1769 GUIDE WIRE: HCPCS

## 2018-08-15 PROCEDURE — 92934 PR PRQ TRLUML CORONARY STENT/ATH/ANGIO ADDL BRANCH: CPT | Performed by: INTERNAL MEDICINE

## 2018-08-15 PROCEDURE — 5A1D70Z PERFORMANCE OF URINARY FILTRATION, INTERMITTENT, LESS THAN 6 HOURS PER DAY: ICD-10-PCS | Performed by: INTERNAL MEDICINE

## 2018-08-15 PROCEDURE — 94761 N-INVAS EAR/PLS OXIMETRY MLT: CPT

## 2018-08-15 PROCEDURE — 6370000000 HC RX 637 (ALT 250 FOR IP): Performed by: STUDENT IN AN ORGANIZED HEALTH CARE EDUCATION/TRAINING PROGRAM

## 2018-08-15 RX ORDER — FENTANYL CITRATE 50 UG/ML
50 INJECTION, SOLUTION INTRAMUSCULAR; INTRAVENOUS ONCE
Status: COMPLETED | OUTPATIENT
Start: 2018-08-15 | End: 2018-08-15

## 2018-08-15 RX ORDER — AMLODIPINE BESYLATE 5 MG/1
5 TABLET ORAL
Status: COMPLETED | OUTPATIENT
Start: 2018-08-15 | End: 2018-08-15

## 2018-08-15 RX ORDER — SODIUM CHLORIDE 0.9 % (FLUSH) 0.9 %
10 SYRINGE (ML) INJECTION EVERY 12 HOURS SCHEDULED
Status: DISCONTINUED | OUTPATIENT
Start: 2018-08-15 | End: 2018-08-17 | Stop reason: HOSPADM

## 2018-08-15 RX ORDER — SODIUM CHLORIDE 0.9 % (FLUSH) 0.9 %
10 SYRINGE (ML) INJECTION PRN
Status: DISCONTINUED | OUTPATIENT
Start: 2018-08-15 | End: 2018-08-17 | Stop reason: HOSPADM

## 2018-08-15 RX ORDER — HYDROCODONE BITARTRATE AND ACETAMINOPHEN 5; 325 MG/1; MG/1
1 TABLET ORAL EVERY 4 HOURS PRN
Status: DISCONTINUED | OUTPATIENT
Start: 2018-08-15 | End: 2018-08-17 | Stop reason: HOSPADM

## 2018-08-15 RX ORDER — CLOPIDOGREL BISULFATE 75 MG/1
75 TABLET ORAL DAILY
Status: DISCONTINUED | OUTPATIENT
Start: 2018-08-16 | End: 2018-08-17 | Stop reason: HOSPADM

## 2018-08-15 RX ORDER — HYDROCODONE BITARTRATE AND ACETAMINOPHEN 5; 325 MG/1; MG/1
2 TABLET ORAL EVERY 4 HOURS PRN
Status: DISCONTINUED | OUTPATIENT
Start: 2018-08-15 | End: 2018-08-17 | Stop reason: HOSPADM

## 2018-08-15 RX ADMIN — SODIUM CHLORIDE, PRESERVATIVE FREE 10 ML: 5 INJECTION INTRAVENOUS at 08:20

## 2018-08-15 RX ADMIN — ACETAMINOPHEN 650 MG: 325 TABLET, FILM COATED ORAL at 05:37

## 2018-08-15 RX ADMIN — AMLODIPINE BESYLATE 5 MG: 5 TABLET ORAL at 10:06

## 2018-08-15 RX ADMIN — HEPARIN SODIUM 5000 UNITS: 5000 INJECTION INTRAVENOUS; SUBCUTANEOUS at 23:00

## 2018-08-15 RX ADMIN — FENTANYL CITRATE 50 MCG: 50 INJECTION, SOLUTION INTRAMUSCULAR; INTRAVENOUS at 16:33

## 2018-08-15 RX ADMIN — PROTAMINE SULFATE 10 MG: 10 INJECTION, SOLUTION INTRAVENOUS at 16:46

## 2018-08-15 RX ADMIN — SODIUM CHLORIDE, PRESERVATIVE FREE 10 ML: 5 INJECTION INTRAVENOUS at 20:04

## 2018-08-15 RX ADMIN — HEPARIN SODIUM 5000 UNITS: 5000 INJECTION INTRAVENOUS; SUBCUTANEOUS at 05:29

## 2018-08-15 RX ADMIN — LOSARTAN POTASSIUM 25 MG: 25 TABLET, FILM COATED ORAL at 09:33

## 2018-08-15 RX ADMIN — DEXTROSE MONOHYDRATE 12.5 G: 25 INJECTION, SOLUTION INTRAVENOUS at 08:19

## 2018-08-15 RX ADMIN — SEVELAMER CARBONATE 800 MG: 800 TABLET, FILM COATED ORAL at 18:23

## 2018-08-15 RX ADMIN — HYDROCODONE BITARTRATE AND ACETAMINOPHEN 2 TABLET: 5; 325 TABLET ORAL at 19:57

## 2018-08-15 RX ADMIN — DEXTROSE MONOHYDRATE 12.5 G: 25 INJECTION, SOLUTION INTRAVENOUS at 10:57

## 2018-08-15 RX ADMIN — IOVERSOL 255 ML: 741 INJECTION INTRA-ARTERIAL; INTRAVENOUS at 15:36

## 2018-08-15 ASSESSMENT — PAIN SCALES - GENERAL
PAINLEVEL_OUTOF10: 0
PAINLEVEL_OUTOF10: 7
PAINLEVEL_OUTOF10: 0
PAINLEVEL_OUTOF10: 0
PAINLEVEL_OUTOF10: 9
PAINLEVEL_OUTOF10: 9
PAINLEVEL_OUTOF10: 4
PAINLEVEL_OUTOF10: 3

## 2018-08-15 ASSESSMENT — PAIN DESCRIPTION - LOCATION
LOCATION: LEG
LOCATION: GENERALIZED
LOCATION: GENERALIZED

## 2018-08-15 ASSESSMENT — PAIN DESCRIPTION - PAIN TYPE
TYPE: ACUTE PAIN

## 2018-08-15 ASSESSMENT — PAIN DESCRIPTION - DESCRIPTORS
DESCRIPTORS: ACHING;TINGLING
DESCRIPTORS: ACHING

## 2018-08-15 ASSESSMENT — PAIN DESCRIPTION - ORIENTATION: ORIENTATION: RIGHT;LEFT

## 2018-08-15 NOTE — PROCEDURES
inability to cross the heavily calcified lesion to deliver the rota wire   high grade calcific stenosis, we decided to abort intervening on the  the circumflex and we switched our attention to the RCA. We then accessed the left common femoral vein and placed a short 6-Zimbabwean  sheath. We then advanced an Botoh 6-Zimbabwean balloon-tipped pacemaker  which was placed in the right ventricular cavity. We then advanced a   7-Zimbabwean JR4 guide catheter with side holes and right coronary artery was  engaged. We then loaded Mac blue wire onto a Turnpike LP catheter and we  were able to advance both distal right PDA. We then switched over the Mac  blue wire to Rota-floppy wire. Once this was done, we then advanced 1.5 mm  Rota jodi and we performed rotational arthrectomy of proximal to mid to  distal RCA as well as ostial to proximal right PDA with adequate   deceleration of approx 71784 rpm noted. Once this was done, we then   re-advanced the Turnpike LP catheter and removed the Rota-Floppy wire. We then advanced the [facility] wire which was placed in the distal right   PDA. We then trapped out the Turnpike LP catheter. We then tried to deliver the AngioSculpt balloon to predilate ostial to  proximal right PDA lesion; however, this did not work. We therefore then  advanced the [facility] GuideLiner extension which was placed in the  proximal RCA. We then predilated the lesion in the mid to distal RCA with  3.5 mm noncompliant balloon. This was done to improve the delivery of the  hardware to right PDA. Once this was done, we then advanced the  AngioSculpt balloon which was then predilated across the right PDA. It was  inflated to 16 atmospheres. We then advanced 2.75 x 16 mm Synergy  drug-eluting stent which was placed across the lesion in ostial and proximal  right PDA. The stent was deployed at 16 atmospheres.   We then advanced a  3.0 mm noncompliant balloon and post-dilated the stent to 16 18:44:48     [email or web address]  Job#: [id number]     Doc#: [id number]    CC:  Magui Joshua MD

## 2018-08-15 NOTE — PROGRESS NOTES
Department of Internal Medicine  Nephrology Progress Note        SUBJECTIVE:    We are following this patient for ESRD management. The patient was seen and examined; she feels well today with no CP, SOB, nausea or vomiting. ROS: No fever or chills. Social: Family at bedside. Physical Exam:    VITALS:  BP (!) 157/70   Pulse 96   Temp 97.8 °F (36.6 °C) (Oral)   Resp 18   Ht 5' 3\" (1.6 m)   Wt 156 lb (70.8 kg)   SpO2 92%   BMI 27.63 kg/m²     General appearance: Seems comfortable, no acute distress. Neck: Trachea midline, thyroid normal.   Lungs:  Non labored breathing, CTA to anterior auscultation. Heart:  S1S2 normal, rub or gallop. No peripheral edema. Abdomen: Soft, non-tender, no organomegaly. Skin: No lesions or rashes, warm to touch. DATA:    CBC:   Lab Results   Component Value Date    WBC 5.2 08/14/2018    RBC 2.83 08/14/2018    HGB 9.2 08/14/2018    HCT 26.8 08/14/2018    MCV 94.9 08/14/2018    MCH 32.4 08/14/2018    MCHC 34.2 08/14/2018    RDW 14.1 08/14/2018     08/14/2018    MPV 9.2 08/14/2018     BMP:    Lab Results   Component Value Date     08/15/2018    K 5.2 08/15/2018    CL 96 08/15/2018    CO2 23 08/15/2018    BUN 45 08/15/2018    LABALBU 3.8 08/13/2018    CREATININE 5.7 08/15/2018    CALCIUM 9.2 08/15/2018    GFRAA 9 08/15/2018    LABGLOM 7 08/15/2018    GLUCOSE 65 08/15/2018       IMPRESSION/RECOMMENDATIONS:      1- ESRD: We will arrange for hemodialysis today per [facility] schedule. 2- Hyperkalemia: Mild, we will treat medically pending dialysis as above. 3- HTN: Blood pressure within acceptable range. 4- Anemia: Stable hemoglobin, monitor. 5- CAD: Management per cardiology and CTS.

## 2018-08-15 NOTE — PROGRESS NOTES
Pt admitted to room 229 from cath lab. Pt has R&L groin sites. Right groin site from cath on Monday, soft nontender. Left groin site from rotoblator today, soft, some tenderness, no bleeding at site. Dialysis in room for treatment. Will continue to monitor.

## 2018-08-15 NOTE — PROGRESS NOTES
or edema bilaterally. Full range of motion without deformity. Skin: Skin color, texture, turgor normal.  No rashes or lesions. Neurologic:  Neurovascularly intact without any focal sensory/motor deficits. Cranial nerves: II-XII intact, grossly non-focal.  Psychiatric: Alert and oriented, thought content appropriate, normal insight  Capillary Refill: Brisk,< 3 seconds   Peripheral Pulses: +2 palpable, equal bilaterally       Labs:   Recent Labs      08/13/18   0655  08/14/18   0957   WBC  8.7  5.2   HGB  10.2*  9.2*   HCT  30.2*  26.8*   PLT  183  139     Recent Labs      08/13/18   0655  08/14/18   0957  08/15/18   0503   NA  141  134*  136   K  5.4*  4.7  5.2*   CL  99  95*  96*   CO2  26  25  23   BUN  52*  30*  45*   CREATININE  6.1*  4.3*  5.7*   CALCIUM  10.0  9.3  9.2     Recent Labs      08/13/18   0655   AST  14*   ALT  9*   BILITOT  0.7   ALKPHOS  101     Recent Labs      08/13/18   0655   INR  1.80*     Recent Labs      08/13/18   0655   TROPONINI  0.04*       Radiology:  XR CHEST PORTABLE   Final Result   1. Moderate pulmonary vascular congestion. Assessment/Plan:    Active Hospital Problems    Diagnosis Date Noted    [facility]I [facility]non-ST elevated [facility] infarction) ([facility] [I21.4] 08/13/2018    Coronary artery disease involving native coronary artery of native heart without angina pectoris [I25.10]     Diabetes mellitus ([facility] [E11.9]     Essential hypertension [I10]     Stage 5 chronic kidney disease on chronic dialysis ([facility] [N18.6, Z99.2]      ASSESSMENT/ PLAN:  1. NSTEMI (POA) S/p [facility] and found to have Calcified Coronaries with Recommendation for CTS consultation for Consideration of Bypass . - [facility] findings (8/13/18)  :   1.  Heavily calcified proximal LAD as well as proximal circumflex disease. 2.  High-grade disease of anterior limb bifurcating large diagonal branch as well as proximal right PDA. 3.  Mildly reduced left ventricular systolic function.   4.  Moderately elevated

## 2018-08-15 NOTE — PLAN OF CARE
Brief Pre-Op Note/Sedation Assessment      Michelle Guo  1940  Cath Pool Rm/NONE  [id number]  4:50 PM    Planned Procedure: Cardiac Catheterization Procedure    Post Procedure Plan: Return to same level of care    Consent: I have discussed with the patient and/or the patient representative the indication, alternatives, and the possible risks and/or complications of the planned procedure and the anesthesia methods. The patient and/or patient representative appear to understand and agree to proceed. Chief Complaint: NSTEMI      Indications for the Procedure:   CAD Presentation:  ACS > 24 hrs and New Onset Angina <= 2 months  Anginal Classification within 2 weeks:  CCS IV - Inability to perform any activity without angina or angina at rest, i.e., severe limitation  NYHA Heart Failure Class within 2 weeks: Class III - Symptoms of HF on less-than-ordinary exertion, Newly Diagnosed? Yes, Heart Failure Type: Combined      Anti- Anginal Meds within 2 weeks:   ANTI-ANGINAL MEDS: Yes: Ca Channel Blockers      Stress or Imaging Studies Performed:  None    Vital Signs:  BP (!) 165/69   Pulse 90   Temp 97.9 °F (36.6 °C) (Oral)   Resp 16   Ht 5' 3\" (1.6 m)   Wt 156 lb (70.8 kg)   SpO2 93%   BMI 27.63 kg/m²     Allergies:  No Known Allergies    Past Medical History:  Past Medical History:   Diagnosis Date    [facility]     CAD (coronary artery disease) 08/2018    High grade stenosis X 2 vessels    Cardiomyopathy ([facility]     Diabetes mellitus ([facility]     GERD (gastroesophageal reflux disease)     Hypertension     Renal failure (ARF), acute on chronic (HCC)     Thyroid disease     thyroid removed.          Surgical History:  Past Surgical History:   Procedure Laterality Date    CARDIAC CATHETERIZATION  08/13/2018    3 Vessel coronary disease, referred to CABG    CHOLECYSTECTOMY      DIALYSIS FISTULA CREATION      left upper arm    EYE SURGERY      cataracts, retinal detatched, fluid removal    PTCA Annetta Edwards MD   12.5 g at 08/15/18 1057    glucagon (rDNA) injection 1 mg  1 mg Intramuscular PRN Annetta Edwards MD        dextrose 5 % solution  100 mL/hr Intravenous PRN Annetta Edwards MD        heparin (porcine) injection 5,000 Units  5,000 Units Subcutaneous 3 times per day Annetta Edwards MD   5,000 Units at 08/15/18 0529    melatonin tablet 3 mg  3 mg Oral Nightly PRN SO Henry CNP        hydrOXYzine (VISTARIL) capsule 25 mg  25 mg Oral Nightly PRN SO Henry CNP               Pre-Sedation:    Pre-Sedation Documentation and Exam:  I have personally completed a history, physical exam & review of systems for this patient (see notes). Prior History of Anesthesia Complications:   none    Modified Mallampati:  II (soft palate, uvula, fauces visible)    ASA Classification:  Class 3 - A patient with severe systemic disease that limits activity but is not incapacitating    Theo Scale: Activity:  2 - Able to move 4 extremities voluntarily on command  Respiration:  2 - Able to breathe deeply and cough freely  Circulation:  2 - BP+/- 20mmHg of normal  Consciousness:  2 - Fully awake  Oxygen Saturation (color):  2 - Able to maintain oxygen saturation >92% on room air    Sedation/Anesthesia Plan:  Guard the patient's safety and welfare. Minimize physical discomfort and pain. Minimize negative psychological responses to treatment by providing sedation and analgesia and maximize the potential amnesia. Patient to meet pre-procedure discharge plan.     Medication Planned:  midazolam intravenously, fentanyl intravenously    Patient is an appropriate candidate for plan of sedation: yes      Electronically signed by Long Lock MD on 8/15/2018 at 4:50 PM

## 2018-08-15 NOTE — PROGRESS NOTES
Hospitalist Progress Note      PCP: Zi Pemberton MD    Date of Admission: 8/13/2018    Chief Complaint: SOB     Hospital Course: Reviewed H&P     Subjective: Patient scheduled for  with Rotablator today . Currently patient denies any chest pain, shortness of breath . Offers no new complaints    Medications:  Reviewed    Infusion Medications    dextrose       Scheduled Medications    protamine IVPB  10 mg Intravenous Once    sodium chloride flush  10 mL Intravenous 2 times per day    atorvastatin  80 mg Oral Daily    cinacalcet  30 mg Oral Daily    insulin glargine  12 Units Subcutaneous Nightly    losartan  25 mg Oral Daily    sevelamer  800 mg Oral BID WC    pantoprazole  40 mg Oral Daily    insulin lispro  0-6 Units Subcutaneous TID WC    insulin lispro  0-3 Units Subcutaneous Nightly    heparin (porcine)  5,000 Units Subcutaneous 3 times per day     PRN Meds: sodium chloride flush, acetaminophen, magnesium hydroxide, ondansetron, glucose, dextrose, glucagon (rDNA), dextrose, melatonin, hydrOXYzine    No intake or output data in the 24 hours ending 08/15/18 1658    Physical Exam Performed:    BP (!) 165/69   Pulse 90   Temp 97.9 °F (36.6 °C) (Oral)   Resp 16   Ht 5' 3\" (1.6 m)   Wt 156 lb (70.8 kg)   SpO2 93%   BMI 27.63 kg/m²     General appearance: No apparent distress, appears stated age and cooperative. HEENT: Pupils equal, round, and reactive to light. Conjunctivae/corneas clear. Neck: Supple, with full range of motion. No jugular venous distention. Trachea midline. Respiratory:  Normal respiratory effort. Clear to auscultation, bilaterally without Rales/Wheezes/Rhonchi. Cardiovascular: Regular rate and rhythm with normal S1/S2 without murmurs, rubs or gallops. Abdomen: Soft, non-tender, non-distended with normal bowel sounds. Musculoskeletal: No clubbing, cyanosis or edema bilaterally. Full range of motion without deformity.   Skin: Skin color, texture, turgor normal.  No

## 2018-08-15 NOTE — PROGRESS NOTES
Nutrition Assessment    Type and Reason for Visit: Initial, Positive Nutrition Screen    Nutrition Recommendations:   · Continue NPO, advance to diet as tolerated per MD  · Monitor need for education  · Monitor NPO status,diet advancement ,nutrition adequacy, weights, pertinent labs, BMs and clinical progress      Malnutrition Assessment:  · Malnutrition Status: Insufficient data    Nutrition Diagnosis:   · Problem: Inadequate oral intake  · Etiology: related to Insufficient energy/nutrient consumption     Signs and symptoms:  as evidenced by NPO status due to medical condition, Weight loss    Nutrition Assessment:  · Subjective Assessment: Positive screen for diet edu. Patient is an [de-identified] y.o. female admitted with NSTEMI. PMHx of GERD, DM, ESRD on dialysis and HTN. Currently NPO. Pt in cath lab at time of visit for PCI. Pt CBW is 161 lb, weight from encounter in June 2018 was 160 lb. Will defer education until follow up. · Nutrition-Focused Physical Findings: active BS  · Wound Type: None  · Current Nutrition Therapies:  · Oral Diet Orders: NPO   · Oral Diet intake: NPO  · Oral Nutrition Supplement (ONS) Orders: None  · ONS intake: NPO  · Anthropometric Measures:  · Ht: 5' 3\" (160 cm)   · Current Body Wt: 156 lb (70.8 kg)  · Usual Body Wt: 160 lb (72.6 kg)  · % Weight Change: 2.5% x 2 months,     · Ideal Body Wt: 115 lb (52.2 kg)  · BMI Classification: BMI 25.0 - 29.9 Overweight  · Comparative Standards (Estimated Nutrition Needs):  · Estimated Daily Total Kcal: 9742-4429  · Estimated Daily Protein (g): 53-64    Estimated Intake vs Estimated Needs: Intake Less Than Needs    Nutrition Risk Level:  Moderate    Nutrition Interventions:   Continue NPO, Start oral diet (when medically feasible)  Continued Inpatient Monitoring, Education Needed    Nutrition Evaluation:   · Evaluation: Goals set   · Goals: Advance to PO diet with intakes of 50% or more this admission    · Monitoring: NPO Status, Diet Progression, Meal

## 2018-08-16 ENCOUNTER — TELEPHONE (OUTPATIENT)
Dept: CARDIOLOGY | Age: 78
End: 2018-08-16

## 2018-08-16 LAB
ANION GAP SERPL CALCULATED.3IONS-SCNC: 10 MMOL/L (ref 3–16)
BUN BLDV-MCNC: 19 MG/DL (ref 7–20)
CALCIUM SERPL-MCNC: 8.8 MG/DL (ref 8.3–10.6)
CHLORIDE BLD-SCNC: 99 MMOL/L (ref 99–110)
CO2: 28 MMOL/L (ref 21–32)
CREAT SERPL-MCNC: 3.4 MG/DL (ref 0.6–1.2)
GFR AFRICAN AMERICAN: 16
GFR NON-AFRICAN AMERICAN: 13
GLUCOSE BLD-MCNC: 135 MG/DL (ref 70–99)
GLUCOSE BLD-MCNC: 160 MG/DL (ref 70–99)
GLUCOSE BLD-MCNC: 77 MG/DL (ref 70–99)
GLUCOSE BLD-MCNC: 85 MG/DL (ref 70–99)
HCT VFR BLD CALC: 25.4 % (ref 36–48)
HEMOGLOBIN: 8.6 G/DL (ref 12–16)
MCH RBC QN AUTO: 32.1 PG (ref 26–34)
MCHC RBC AUTO-ENTMCNC: 34 G/DL (ref 31–36)
MCV RBC AUTO: 94.5 FL (ref 80–100)
PDW BLD-RTO: 13.5 % (ref 12.4–15.4)
PERFORMED ON: ABNORMAL
PERFORMED ON: ABNORMAL
PERFORMED ON: NORMAL
PLATELET # BLD: 126 K/UL (ref 135–450)
PMV BLD AUTO: 8.9 FL (ref 5–10.5)
POTASSIUM REFLEX MAGNESIUM: 4.6 MMOL/L (ref 3.5–5.1)
RBC # BLD: 2.69 M/UL (ref 4–5.2)
SODIUM BLD-SCNC: 137 MMOL/L (ref 136–145)
WBC # BLD: 4 K/UL (ref 4–11)

## 2018-08-16 PROCEDURE — G8987 SELF CARE CURRENT STATUS: HCPCS

## 2018-08-16 PROCEDURE — 97530 THERAPEUTIC ACTIVITIES: CPT

## 2018-08-16 PROCEDURE — 6360000002 HC RX W HCPCS: Performed by: INTERNAL MEDICINE

## 2018-08-16 PROCEDURE — 99233 SBSQ HOSP IP/OBS HIGH 50: CPT | Performed by: INTERNAL MEDICINE

## 2018-08-16 PROCEDURE — 2580000003 HC RX 258: Performed by: INTERNAL MEDICINE

## 2018-08-16 PROCEDURE — G8988 SELF CARE GOAL STATUS: HCPCS

## 2018-08-16 PROCEDURE — 97535 SELF CARE MNGMENT TRAINING: CPT

## 2018-08-16 PROCEDURE — 2000000000 HC ICU R&B

## 2018-08-16 PROCEDURE — G8979 MOBILITY GOAL STATUS: HCPCS

## 2018-08-16 PROCEDURE — 97166 OT EVAL MOD COMPLEX 45 MIN: CPT

## 2018-08-16 PROCEDURE — 85027 COMPLETE CBC AUTOMATED: CPT

## 2018-08-16 PROCEDURE — 6370000000 HC RX 637 (ALT 250 FOR IP): Performed by: INTERNAL MEDICINE

## 2018-08-16 PROCEDURE — 36415 COLL VENOUS BLD VENIPUNCTURE: CPT

## 2018-08-16 PROCEDURE — 97116 GAIT TRAINING THERAPY: CPT

## 2018-08-16 PROCEDURE — 80048 BASIC METABOLIC PNL TOTAL CA: CPT

## 2018-08-16 PROCEDURE — G8978 MOBILITY CURRENT STATUS: HCPCS

## 2018-08-16 PROCEDURE — 97162 PT EVAL MOD COMPLEX 30 MIN: CPT

## 2018-08-16 RX ORDER — WARFARIN SODIUM 2 MG/1
2 TABLET ORAL DAILY
Status: DISCONTINUED | OUTPATIENT
Start: 2018-08-16 | End: 2018-08-17

## 2018-08-16 RX ORDER — ASPIRIN 81 MG/1
81 TABLET, CHEWABLE ORAL DAILY
Status: DISCONTINUED | OUTPATIENT
Start: 2018-08-16 | End: 2018-08-16

## 2018-08-16 RX ADMIN — ATORVASTATIN CALCIUM 80 MG: 80 TABLET, FILM COATED ORAL at 08:35

## 2018-08-16 RX ADMIN — SEVELAMER CARBONATE 800 MG: 800 TABLET, FILM COATED ORAL at 08:35

## 2018-08-16 RX ADMIN — PANTOPRAZOLE SODIUM 40 MG: 40 TABLET, DELAYED RELEASE ORAL at 08:35

## 2018-08-16 RX ADMIN — CLOPIDOGREL BISULFATE 75 MG: 75 TABLET ORAL at 08:35

## 2018-08-16 RX ADMIN — WARFARIN SODIUM 2 MG: 2 TABLET ORAL at 18:07

## 2018-08-16 RX ADMIN — ASPIRIN 81 MG 81 MG: 81 TABLET ORAL at 11:25

## 2018-08-16 RX ADMIN — LOSARTAN POTASSIUM 25 MG: 25 TABLET, FILM COATED ORAL at 08:35

## 2018-08-16 RX ADMIN — CINACALCET HYDROCHLORIDE 30 MG: 30 TABLET, COATED ORAL at 08:42

## 2018-08-16 RX ADMIN — SODIUM CHLORIDE, PRESERVATIVE FREE 10 ML: 5 INJECTION INTRAVENOUS at 21:01

## 2018-08-16 RX ADMIN — HYDROCODONE BITARTRATE AND ACETAMINOPHEN 2 TABLET: 5; 325 TABLET ORAL at 03:32

## 2018-08-16 RX ADMIN — HEPARIN SODIUM 5000 UNITS: 5000 INJECTION INTRAVENOUS; SUBCUTANEOUS at 07:27

## 2018-08-16 RX ADMIN — INSULIN LISPRO 1 UNITS: 100 INJECTION, SOLUTION INTRAVENOUS; SUBCUTANEOUS at 21:01

## 2018-08-16 RX ADMIN — MUPIROCIN: 20 OINTMENT TOPICAL at 21:00

## 2018-08-16 RX ADMIN — SEVELAMER CARBONATE 800 MG: 800 TABLET, FILM COATED ORAL at 18:07

## 2018-08-16 RX ADMIN — SODIUM CHLORIDE, PRESERVATIVE FREE 10 ML: 5 INJECTION INTRAVENOUS at 08:36

## 2018-08-16 RX ADMIN — INSULIN GLARGINE 12 UNITS: 100 INJECTION, SOLUTION SUBCUTANEOUS at 21:02

## 2018-08-16 ASSESSMENT — PAIN DESCRIPTION - LOCATION
LOCATION: GROIN
LOCATION: GROIN

## 2018-08-16 ASSESSMENT — PAIN DESCRIPTION - PAIN TYPE
TYPE: ACUTE PAIN
TYPE: ACUTE PAIN;SURGICAL PAIN
TYPE: ACUTE PAIN;SURGICAL PAIN

## 2018-08-16 ASSESSMENT — PAIN DESCRIPTION - DESCRIPTORS
DESCRIPTORS: DISCOMFORT
DESCRIPTORS: DISCOMFORT

## 2018-08-16 ASSESSMENT — PAIN DESCRIPTION - ORIENTATION
ORIENTATION: RIGHT;LEFT
ORIENTATION: LEFT

## 2018-08-16 ASSESSMENT — PAIN SCALES - GENERAL
PAINLEVEL_OUTOF10: 0
PAINLEVEL_OUTOF10: 7

## 2018-08-16 NOTE — PROGRESS NOTES
Physical Therapy    Facility/Department: [facility] C2 CARD TELEMETRY  Initial Assessment    NAME: Carolyn Blancas  : 1940  MRN: [id number]    Date of Service: 2018    Discharge Recommendations:  Home with assist PRN, Home with Home health PT   PT Equipment Recommendations  Equipment Needed: No (pt already has RW for home use)    Patient Diagnosis(es): The primary encounter diagnosis was NSTEMI (non-ST elevated myocardial infarction) ([facility] Diagnoses of ST segment depression, ST elevation, and Chest pain due to myocardial ischemia, unspecified ischemic chest pain type were also pertinent to this visit. has a past medical history of A-fib [facility]); CAD (coronary artery disease); Cardiomyopathy ([facility] Diabetes mellitus ([facility] GERD (gastroesophageal reflux disease); Hypertension; Renal failure (ARF), acute on chronic (HCC); and Thyroid disease. has a past surgical history that includes Cholecystectomy; Thyroid surgery; Dialysis fistula creation; eye surgery; Cardiac catheterization (2018); and Percutaneous Transluminal Coronary Angio (08/15/2018).     Restrictions  Restrictions/Precautions  Restrictions/Precautions: General Precautions, Fall Risk  Position Activity Restriction  Other position/activity restrictions: High fall risk per nursing assessment, telemetry with continuous pulse ox  Vision/Hearing  Vision: Impaired  Vision Exceptions: Wears glasses at all times (pt reports impaired depth perception)  Hearing: Within functional limits     Subjective  General  Chart Reviewed: Yes  Patient assessed for rehabilitation services?: Yes  Family / Caregiver Present: Yes (two sons)  Referring Practitioner: Dr. Wallace Head  Referral Date : 18  Diagnosis: NSTEMI, s/p B cardiac cath with Rotablator-assisted PCI and temporary pacemaker placement on 8/15/18  Follows Commands: Within Functional Limits  General Comment  Comments: Pt sitting up in chair upon entry of PT  Subjective  Subjective: Pt agreeable

## 2018-08-16 NOTE — TELEPHONE ENCOUNTER
Dr. Vu Carrasco would like to do a rotoblade procedure of CX and LAD on September 12th. Dr. Vu Carrasco said to hold her Coumadin for 4 days prior to her procedure and no bridging needed. Also she should take half of her PM insulin dose and no insulin the morning of her procedure. She can take her other medications as usual.    Tiffany said her procedure time would be 10AM with an arrival time of 8:15AM.  Instructions regarding medications and NPO were put on the patient's discharge instructions. Patient's nurse Luis Culver was notified to have nurse review the instructions with the patient when she is discharged. She verbalized understanding of all of the above.

## 2018-08-16 NOTE — PROGRESS NOTES
4 eyed assessment completed. No evidence of skin breakdown. Mepilex drsg in place on coccyx. Pt alert and oriented x4. Bilateral femoral sites soft; no evidence of bleeding; left side drsg/right side drsg. .  Dialysis M/W/F / typically anuric. Pt on RA - SPO2 97%. Alarms on and audible. Monitoring continues.     Treva Coffey RN

## 2018-08-16 NOTE — PROGRESS NOTES
4 Eyes Skin Assessment     The patient is being assess for   Shift Handoff    I agree that 2 RN's have performed a thorough Head to Toe Skin Assessment on the patient. ALL assessment sites listed below have been assessed. Areas assessed by both nurses:   [x]   Head, Face, and Ears   [x]   Shoulders, Back, and Chest, Abdomen  [x]   Arms, Elbows, and Hands   [x]   Coccyx, Sacrum, and Ischium  [x]   Legs, Feet, and Heels            **SHARE this note so that the co-signing nurse is able to place an eSignature**    Co-signer eSignature: Electronically signed by Huan Bolanos RN on 8/16/18 at 8:20 AM    Does the Patient have Skin Breakdown?   No          Ruben Prevention initiated:  Yes   Wound Care Orders initiated:  NA      [facility] nurse consulted for Pressure Injury (Stage 3,4, Unstageable, DTI, NWPT, Complex wounds)and New or Established Ostomies:  No      Primary Nurse eSignature: Electronically signed by Johny Monroe RN on 8/16/18 at 7:15 AM      \

## 2018-08-16 NOTE — PROGRESS NOTES
Occupational Therapy   Occupational Therapy Initial Assessment/Treatment  Date: 2018   Patient Name: Naif García  MRN: [id number]     : 1940    Date of Service: 2018    Discharge Recommendations:  Home with assist PRN, Home with Home health OT         Patient Diagnosis(es): The primary encounter diagnosis was NSTEMI (non-ST elevated myocardial infarction) ([facility] Diagnoses of ST segment depression, ST elevation, and Chest pain due to myocardial ischemia, unspecified ischemic chest pain type were also pertinent to this visit. has a past medical history of A-fib [facility]); CAD (coronary artery disease); Cardiomyopathy ([facility] Diabetes mellitus ([facility] GERD (gastroesophageal reflux disease); Hypertension; Renal failure (ARF), acute on chronic (HCC); and Thyroid disease. has a past surgical history that includes Cholecystectomy; Thyroid surgery; Dialysis fistula creation; eye surgery; Cardiac catheterization (2018); and Percutaneous Transluminal Coronary Angio (08/15/2018). Restrictions  Restrictions/Precautions  Restrictions/Precautions: General Precautions, Fall Risk  Position Activity Restriction  Other position/activity restrictions: telemetry    Subjective   General  Chart Reviewed: Yes  Patient assessed for rehabilitation services?: Yes  Family / Caregiver Present: No  Referring Practitioner: Maeve Wall MD  Diagnosis: NSTEMI, s/p [facility] 18  Subjective  Subjective: Pt. up in chair upon entry, pleasant and agreeable to OT/PT evaluations.   Pain Assessment  Patient Currently in Pain: Yes  Pain Assessment: 0-10  Pain Level:  (unable to rate states more of a discomfort)  Pain Type: Acute pain, Surgical pain  Pain Location: Groin  Pain Orientation: Left  Pain Descriptors: Discomfort  Pain Intervention(s): Therapeutic presence, Ambulation/Increased activity, Repositioned  Pre Treatment Pain Screening  Intervention List: Patient able to continue with

## 2018-08-16 NOTE — PROGRESS NOTES
Hospitalist Progress Note      PCP: Monica Horne MD    Date of Admission: 8/13/2018    Chief Complaint: SOB     Hospital Course: Reviewed H&P     Subjective: Patient s/p successful complex Rotablator- assisted PCI of proximal, distal RCA as well as ostial to proximal right PDA with placement of 3 Synergy drug-eluting stents  on 8/15/18   . Patient admitted to ICU post procedure . Currently patient denies any chest pain, shortness of breath . Offers no new complaints.   Seen with Sons ×2 at bedside   - D/w RN - no acute events reported overnight   - W/d/w  cardiology regarding resuming Coumadin and clarification regarding triple anti-coagulation ( aspirin, Plavix, Coumadin)     Medications:  Reviewed    Infusion Medications    dextrose       Scheduled Medications    mupirocin   Nasal BID    aspirin  81 mg Oral Daily    sodium chloride flush  10 mL Intravenous 2 times per day    clopidogrel  75 mg Oral Daily    atorvastatin  80 mg Oral Daily    cinacalcet  30 mg Oral Daily    insulin glargine  12 Units Subcutaneous Nightly    losartan  25 mg Oral Daily    sevelamer  800 mg Oral BID WC    pantoprazole  40 mg Oral Daily    insulin lispro  0-6 Units Subcutaneous TID WC    insulin lispro  0-3 Units Subcutaneous Nightly    heparin (porcine)  5,000 Units Subcutaneous 3 times per day     PRN Meds: sodium chloride flush, HYDROcodone 5 mg - acetaminophen **OR** HYDROcodone 5 mg - acetaminophen, acetaminophen, magnesium hydroxide, ondansetron, glucose, dextrose, glucagon (rDNA), dextrose, melatonin, hydrOXYzine      Intake/Output Summary (Last 24 hours) at 08/16/18 1342  Last data filed at 08/16/18 0955   Gross per 24 hour   Intake              400 ml   Output             1200 ml   Net             -800 ml       Physical Exam Performed:  BP (!) 153/58   Pulse 72   Temp 97.8 °F (36.6 °C) (Axillary)   Resp 13   Ht 5' 3\" (1.6 m)   Wt 156 lb 12 oz (71.1 kg)   SpO2 98%   BMI 27.77 kg/m²     General appearance: No apparent distress, appears stated age and cooperative. HEENT: Pupils equal, round, and reactive to light. Conjunctivae/corneas clear. Neck: Supple, with full range of motion. No jugular venous distention. Trachea midline. Respiratory:  Normal respiratory effort. Clear to auscultation, bilaterally without Rales/Wheezes/Rhonchi. Cardiovascular: Regular rate and rhythm with normal S1/S2 without murmurs, rubs or gallops. Abdomen: Soft, non-tender, non-distended with normal bowel sounds. Musculoskeletal: No clubbing, cyanosis or edema bilaterally. Full range of motion without deformity. Skin: Skin color, texture, turgor normal.  No rashes or lesions. Neurologic:  Neurovascularly intact without any focal sensory/motor deficits. Cranial nerves: II-XII intact, grossly non-focal.  Psychiatric: Alert and oriented, thought content appropriate, normal insight  Capillary Refill: Brisk,< 3 seconds   Peripheral Pulses: +2 palpable, equal bilaterally     Labs:   Recent Labs      08/14/18   0957  08/16/18   0438   WBC  5.2  4.0   HGB  9.2*  8.6*   HCT  26.8*  25.4*   PLT  139  126*     Recent Labs      08/14/18   0957  08/15/18   0503  08/16/18   0438   NA  134*  136  137   K  4.7  5.2*  4.6   CL  95*  96*  99   CO2  25  23  28   BUN  30*  45*  19   CREATININE  4.3*  5.7*  3.4*   CALCIUM  9.3  9.2  8.8     Radiology:  XR CHEST PORTABLE   Final Result   1. Moderate pulmonary vascular congestion.            Assessment/Plan:  Active Hospital Problems    Diagnosis Date Noted    NSTEMI (non-ST elevated myocardial infarction) ([facility] [I21.4] 08/13/2018    Coronary artery disease involving native coronary artery of native heart without angina pectoris [I25.10]     Diabetes mellitus ([facility] [E11.9]     Essential hypertension [I10]     Stage 5 chronic kidney disease on chronic dialysis ([facility] [N18.6, Z99.2]      ASSESSMENT/ PLAN:  1. NSTEMI (POA) S/p [facility] and found to have Calcified Coronaries with Recommendation for CTS consultation for Consideration of Bypass . - [facility] findings (8/13/18)  :   1.  Heavily calcified proximal LAD as well as proximal circumflex disease. 2.  High-grade disease of anterior limb bifurcating large diagonal branch as well as proximal right PDA. 3.  Mildly reduced left ventricular systolic function. 4.  Moderately elevated left ventricular filling pressure, which is from hypertension and ischemia. 6.  Mild gradient across aortic valve suggestive of mild stenosis    - CTS consulted - evaluated and recommended not a candidate for  CABG .    -  S/p successful complex Rotablator- assisted PCI of proximal, distal RCA as well as ostial to proximal right PDA with placement of 3 Synergy drug-eluting stents  on 8/15/18  - Continue Aspirin , statin , plavix     2. HTN - Optimal control on home meds (Norvasc 10+ Hydralazine 50/25/50 )  + Losartan - Will resume Losartan for now and slowly resume other meds as BP  Allows      3. T2 DM - Resume home Lantus and Cover with SSI      4. ESRD on HD - Nephrology consulted to asssist with Maintenance Dialysis      5. Acute Hypoxic Respiratory failure secondary to Pulmonary edema - Currently on 2 L/min O2 NC   - improved  with Dialysis   - Wean O2 as able     6. Paroxysmal atrial fibrillation - currently rate controlled and on Coumadin for anticoagulation at home. Held on admission in consideration for CABG . Will resume today and consulted pharmacy to assist with Coumadin dosing.   Follow PT/INR daily    DVT Prophylaxis: Heparin   Diet: DIET CARDIAC;  Code Status: Full Code    PT/OT Eval Status: Ordered    Dispo - Likely 1- 2 days pending INR to be therapeutic; okay to transfer patient out of ICU to PCU- once approved  by cardiology    Hannah Fernando MD

## 2018-08-16 NOTE — FLOWSHEET NOTE
08/15/18 2100   Vital Signs   BP (!) 135/48   Pulse 74   Weight 156 lb 8.4 oz (71 kg)   Percent Weight Change -1.11   Dry Weight 156 lb 8.4 oz (71 kg)   Pain Assessment   Pain Assessment 0-10   Pain Level 4   Pain Type Acute pain   Pain Location Leg   Pain Orientation Right;Left   Post-Hemodialysis Assessment   Post-Treatment Procedures Blood returned; Access bleeding time < 10 minutes   Machine Disinfection Process Acid/Vinegar Clean;Heat Disinfect; Exterior Machine Disinfection   Rinseback Volume (ml) 400 ml   Total Liters Processed (l/min) 65.8 l/min   Dialyzer Clearance Moderately streaked   Duration of Treatment (minutes) 180 minutes   Heparin amount administered during treatment (units) 0 units   Hemodialysis Intake (ml) 400 ml   Hemodialysis Output (ml) 1200 ml   NET Removed (ml) 800 ml   Tolerated Treatment Good   Patient Response to Treatment pt alert and talking during treatment. Bilateral Breath Sounds Clear   Edema None   Treatment time: 3hours  Net UF: 800 ml     Pre weight: 71.8 kg   Post weight: 71 kg  EDW: 71 kg     Access used: AVF left upper  Access function: good with  ml/min     Medications or blood products given: floor RN admin hydrocodone     Regular outpatient schedule: MWF     Summary of response to treatment: pt alert and talking during treatment. pt received pain medication last hr of treatment. VSS. Report given. Removed 800ml to EDW.   Copy of dialysis treatment record placed in chart, to be scanned into EMR.

## 2018-08-16 NOTE — PROGRESS NOTES
Respiratory distress, appears well developed and well nourished. Eyes:  Sclera nonicteric  Nose/Sinuses:  negative findings: nose shows no deformity, asymmetry, or inflammation, nasal mucosa normal, septum midline with no perforation or bleeding  Back:  no pain to palpation  Joint:  no active joint inflammation  Musculoskeletal:  negative  Skin:  Warm and dry  Neck:  Negative for JVD and Carotid Bruits. Chest:  Clear to auscultation, respiration easy  Cardiovascular:  RRR, S1S2 normal, no murmur, no rub or thrill. Abdomen:  Soft normal liver and spleen  Extremities:   No edema, clubbing, cyanosis,  Pulses: Femoral and pedal pulses are normal.  Neuro: intact    Medications:    mupirocin   Nasal BID    warfarin  2 mg Oral Daily    sodium chloride flush  10 mL Intravenous 2 times per day    clopidogrel  75 mg Oral Daily    atorvastatin  80 mg Oral Daily    cinacalcet  30 mg Oral Daily    insulin glargine  12 Units Subcutaneous Nightly    losartan  25 mg Oral Daily    sevelamer  800 mg Oral BID WC    pantoprazole  40 mg Oral Daily    insulin lispro  0-6 Units Subcutaneous TID WC    insulin lispro  0-3 Units Subcutaneous Nightly      dextrose       sodium chloride flush, HYDROcodone 5 mg - acetaminophen **OR** HYDROcodone 5 mg - acetaminophen, acetaminophen, magnesium hydroxide, ondansetron, glucose, dextrose, glucagon (rDNA), dextrose, melatonin, hydrOXYzine    Lab Data:  CBC: Recent Labs      08/14/18   0957  08/16/18   0438   WBC  5.2  4.0   HGB  9.2*  8.6*   HCT  26.8*  25.4*   MCV  94.9  94.5   PLT  139  126*     BMP: Recent Labs      08/14/18   0957  08/15/18   0503  08/16/18   0438   NA  134*  136  137   K  4.7  5.2*  4.6   CL  95*  96*  99   CO2  25  23  28   BUN  30*  45*  19   CREATININE  4.3*  5.7*  3.4*     LIVER PROFILE: No results for input(s): AST, ALT, LIPASE, BILIDIR, BILITOT, ALKPHOS in the last 72 hours. Invalid input(s):   AMYLASE,  ALB  PT/INR: No results for input(s): PROTIME, INR in the last 72 hours. APTT: No results for input(s): APTT in the last 72 hours. BNP:  No results for input(s): BNP in the last 72 hours. IMAGING:       Assessment  - s/p complex Rotablator-assisted PCI of proximal, distal RCA as well as ostial to proximal right PDA with placement of three Synergy drug  eluting stents  - Residual subtotal proximal circumflex as well as the proximal LAD disease  - Mild ischemic cardiomyopathy  - Recurrent paroxysmal atrial fibrillation  - ESRD on dialysis  - DM  - HTN controlled      Plan:  - She will stay on P2Y12 therapy (Plavix) indefinitely due to long stented segments. No need for Asa to decrease the risk of triple therapy as she she is on Coumadin for atrial fibrillation.     - She will be scheduled for rotablade assisted PCI of Circumflex and LAD lesions which were deferred due to unavailability of the crossing catheter. - Will sign off. Please call with questions. Madeleine Contreras, PGY-2  8/16/2018 3:25 PM  [phone]    I personally took the history and examined the patient, reviewed the chart and labs and formulated the plan.        CAROLINA Dsouza MD, [facility] [place]

## 2018-08-16 NOTE — PROGRESS NOTES
Bedside report received. 4 eyed skin assessment completed. No evidence of skin breakdown. Mepilex dsg on sacrum. Pt alert and oriented x4. Pt c/o bilat leg pain. 2 Norco tablets PO given @1957. Pt on RA. SpO2 96, NSR 60s, SBP 100s. Pt anuric. Alarms on and audible. Will continue to monitor.  Letty Olivo RN

## 2018-08-17 VITALS
RESPIRATION RATE: 17 BRPM | BODY MASS INDEX: 27.62 KG/M2 | DIASTOLIC BLOOD PRESSURE: 42 MMHG | HEART RATE: 88 BPM | HEIGHT: 63 IN | WEIGHT: 155.87 LBS | SYSTOLIC BLOOD PRESSURE: 152 MMHG | OXYGEN SATURATION: 99 % | TEMPERATURE: 98.8 F

## 2018-08-17 LAB
INR BLD: 1.56 (ref 0.86–1.14)
PROTHROMBIN TIME: 17.8 SEC (ref 9.8–13)

## 2018-08-17 PROCEDURE — 6370000000 HC RX 637 (ALT 250 FOR IP): Performed by: INTERNAL MEDICINE

## 2018-08-17 PROCEDURE — 2580000003 HC RX 258: Performed by: INTERNAL MEDICINE

## 2018-08-17 PROCEDURE — 36415 COLL VENOUS BLD VENIPUNCTURE: CPT

## 2018-08-17 PROCEDURE — 90937 HEMODIALYSIS REPEATED EVAL: CPT

## 2018-08-17 PROCEDURE — 6370000000 HC RX 637 (ALT 250 FOR IP): Performed by: NURSE PRACTITIONER

## 2018-08-17 PROCEDURE — 85610 PROTHROMBIN TIME: CPT

## 2018-08-17 PROCEDURE — 6360000002 HC RX W HCPCS: Performed by: INTERNAL MEDICINE

## 2018-08-17 RX ORDER — WARFARIN SODIUM 2 MG/1
2 TABLET ORAL
Status: DISCONTINUED | OUTPATIENT
Start: 2018-08-17 | End: 2018-08-17 | Stop reason: HOSPADM

## 2018-08-17 RX ORDER — CLOPIDOGREL BISULFATE 75 MG/1
75 TABLET ORAL DAILY
Qty: 30 TABLET | Refills: 3 | Status: SHIPPED | OUTPATIENT
Start: 2018-08-17

## 2018-08-17 RX ORDER — LOSARTAN POTASSIUM 25 MG/1
25 TABLET ORAL DAILY
Qty: 30 TABLET | Refills: 3 | Status: SHIPPED | OUTPATIENT
Start: 2018-08-18

## 2018-08-17 RX ORDER — ATORVASTATIN CALCIUM 80 MG/1
80 TABLET, FILM COATED ORAL DAILY
Qty: 30 TABLET | Refills: 3 | Status: SHIPPED | OUTPATIENT
Start: 2018-08-18

## 2018-08-17 RX ADMIN — CINACALCET HYDROCHLORIDE 30 MG: 30 TABLET, COATED ORAL at 12:03

## 2018-08-17 RX ADMIN — ATORVASTATIN CALCIUM 80 MG: 80 TABLET, FILM COATED ORAL at 12:03

## 2018-08-17 RX ADMIN — LOSARTAN POTASSIUM 25 MG: 25 TABLET, FILM COATED ORAL at 12:03

## 2018-08-17 RX ADMIN — CLOPIDOGREL BISULFATE 75 MG: 75 TABLET ORAL at 12:04

## 2018-08-17 RX ADMIN — SODIUM CHLORIDE, PRESERVATIVE FREE 10 ML: 5 INJECTION INTRAVENOUS at 12:04

## 2018-08-17 RX ADMIN — DARBEPOETIN ALFA 100 MCG: 100 INJECTION, SOLUTION INTRAVENOUS; SUBCUTANEOUS at 10:30

## 2018-08-17 RX ADMIN — PANTOPRAZOLE SODIUM 40 MG: 40 TABLET, DELAYED RELEASE ORAL at 12:06

## 2018-08-17 RX ADMIN — IRON SUCROSE 50 MG: 20 INJECTION, SOLUTION INTRAVENOUS at 11:09

## 2018-08-17 RX ADMIN — Medication 3 MG: at 00:01

## 2018-08-17 ASSESSMENT — PAIN SCALES - GENERAL
PAINLEVEL_OUTOF10: 0

## 2018-08-17 NOTE — PROGRESS NOTES
Message: [phone] FROM: KAITLIN SIEGEL [facility] C2 PROGRESSIVE CARE/CARDIAC ICU PATIENT TO GET HD THIS AM. DIALYSIS NURSE WANTS TO DO IT IN DIALYSIS ROOM ON A3. PATIENT DOES NOT HAVE MOVE OUT ORDERS BUT IS STABLE. NO IVF OR DRIPS, ON ROOM AIR, A&O. PATIENT WAS REPORTEDLY TO DC TO HOME AFTER HD IN EARLY AM. HD NURSE WANTS TO START @ 0630. CARDIOLOGY NOTE YESTERDAY SAID, \"WILL SIGN OFF\" INTERNAL MEDICINE SAID, \"MOVE TO FLOOR IF OK WITH CARDIOLOGY\". IF YOU DO NOT WANT TO MOVE HER OUT WOULD YOU BE COMFORTABLE GIVING AN ORDER OK TO DO HD ON A3 WHILE ON TELEMETRY?

## 2018-08-17 NOTE — DISCHARGE SUMMARY
Hospital Medicine Discharge Summary    Patient ID: Arcadio Adam      Patient's PCP: Jose Wise MD    Admit Date: 8/13/2018     Discharge Date:  08/17/18    Admitting Physician: Ty Velarde MD     Discharge Physician: Ty Velarde MD     Discharge Diagnoses: Active Hospital Problems    Diagnosis Date Noted    [facility]non-ST elevated [facility]) [facility] [I21.4] 08/13/2018    Coronary artery disease involving native coronary artery of native heart without angina pectoris [I25.10]     Diabetes mellitus ([facility] [E11.9]     Essential hypertension [I10]     Stage 5 chronic kidney disease on chronic dialysis ([facility] [N18.6, Z99.2]        The patient was seen and examined on day of discharge and this discharge summary is in conjunction with any daily progress note from day of discharge. History Of Present Illness:    [de-identified] y.o. female with PMH of ESRD on HD (MWF) who presented to [facility] with C/o Worsening Mid Back pain , 8/10 in severity , More of Burning type  associated with SOB , Tingling/NUmbness in Right Extremity and diaphoresis started this AM . Patient also had an episode of N/V. Denies any Cough , fever, chills , N/V      ED course :Patient had EKG changes and Mildly elevated Troponin . Cardiology Was consulted from ED and patient taken to Cath lab for intervention - Found to have Calcified Multiple coronaries not amenable for stenting and CTS consulted for consideration of Bypass . She got admitted post Cath lab intervention and managed for the following     Hospital Course: By Problem List   1. NSTEMI (POA) S/p C and found to have Calcified Coronaries with Recommendation for CTS consultation for Consideration of Bypass . - [facility] findings (8/13/18)  :   1.  Heavily calcified proximal LAD as well as proximal circumflex disease. 2.  High-grade disease of anterior limb bifurcating large diagonal branch as well as proximal right PDA.   3.  Mildly reduced left distention. Trachea midline. Respiratory:  Normal respiratory effort. Clear to auscultation, bilaterally without Rales/Wheezes/Rhonchi. Cardiovascular: Regular rate and rhythm with normal S1/S2 without murmurs, rubs or gallops. Abdomen: Soft, non-tender, non-distended with normal bowel sounds. Musculoskeletal: No clubbing, cyanosis or edema bilaterally. Full range of motion without deformity. Skin: Skin color, texture, turgor normal.  No rashes or lesions. Neurologic:  Neurovascularly intact without any focal sensory/motor deficits. Cranial nerves: II-XII intact, grossly non-focal.  Psychiatric: Alert and oriented, thought content appropriate, normal insight  Capillary Refill: Brisk,< 3 seconds   Peripheral Pulses: +2 palpable, equal bilaterally     Labs: For convenience and continuity at follow-up the following most recent labs are provided:      CBC:    Lab Results   Component Value Date    WBC 4.0 08/16/2018    HGB 8.6 08/16/2018    HCT 25.4 08/16/2018     08/16/2018       Renal:    Lab Results   Component Value Date     08/16/2018    K 4.6 08/16/2018    CL 99 08/16/2018    CO2 28 08/16/2018    BUN 19 08/16/2018    CREATININE 3.4 08/16/2018    CALCIUM 8.8 08/16/2018     Significant Diagnostic Studies    Radiology:   XR CHEST PORTABLE   Final Result   1. Moderate pulmonary vascular congestion.             Consults:   IP CONSULT TO CARDIOLOGY  IP CONSULT TO CARDIOTHORACIC SURGERY  IP CONSULT TO NEPHROLOGY  IP CONSULT TO CARDIAC REHAB  IP CONSULT TO CARDIAC REHAB  IP CONSULT TO PHARMACY  IP CONSULT TO HOME CARE NEEDS    Disposition: Home with home care     Condition at Discharge: Stable    Discharge Instructions/Follow-up:    Follow-up With Details Why [address], MD Schedule an appointment as soon as possible for a visit in 1 week  [address] [id number] [facility]   Sheryle Lex, MD Call To schedule appointment for PCI to LAD  [phone] [place] [address]  [phone]     Code Status:  Full Code    Activity: activity as tolerated    Diet: cardiac diet      Discharge Medications:   Current Discharge Medication List           Details   clopidogrel (PLAVIX) 75 MG tablet Take 1 tablet by mouth daily  Qty: 30 tablet, Refills: 3              Details   atorvastatin (LIPITOR) 80 MG tablet Take 1 tablet by mouth daily  Qty: 30 tablet, Refills: 3      losartan (COZAAR) 25 MG tablet Take 1 tablet by mouth daily  Qty: 30 tablet, Refills: 3              Details   cinacalcet (SENSIPAR) 30 MG tablet Take 30 mg by mouth daily      insulin glargine (LANTUS) 100 UNIT/ML injection vial Inject 12 Units into the skin nightly       hydrALAZINE (APRESOLINE) 25 MG tablet Take 50 mg by mouth 3 times daily 50 in am, 25 in afternoon and 50 in pm      sevelamer (RENVELA) 800 MG tablet Take 1 tablet by mouth 2 times daily (with meals)      flecainide (TAMBOCOR) 50 MG tablet Take 50 mg by mouth 2 times daily      furosemide (LASIX) 40 MG tablet Take 80 mg by mouth 2 times daily       B Complex-C-Folic Acid (FLAVIA CAPS PO) Take  by mouth daily. amlodipine (NORVASC) 10 MG tablet Take 10 mg by mouth nightly       warfarin (COUMADIN) 2 MG tablet Take 2 mg by mouth daily at 1800       Omeprazole (PRILOSEC PO) Take 20 mg by mouth daily              Time Spent on discharge is more than 30 minutes in the examination, evaluation, counseling and review of medications and discharge plan. Signed:  Tawnya Barajas MD   8/17/2018      Thank you Louis Phipps MD for the opportunity to be involved in this patient's care. If you have any questions or concerns please feel free to contact me at [phone].

## 2018-08-17 NOTE — PROGRESS NOTES
4 Eyes Skin Assessment     The patient is being assess for   Shift Handoff    I agree that 2 RN's have performed a thorough Head to Toe Skin Assessment on the patient. ALL assessment sites listed below have been assessed. Areas assessed by both nurses:   [x]   Head, Face, and Ears   [x]   Shoulders, Back, and Chest, Abdomen  [x]   Arms, Elbows, and Hands   [x]   Coccyx, Sacrum, and Ischium  [x]   Legs, Feet, and Heels         **SHARE this note so that the co-signing nurse is able to place an eSignature**    Co-signer eSignature: Electronically signed by Willie James RN on 8/17/18 at 9:47 AM    Does the Patient have Skin Breakdown?   No          Ruben Prevention initiated:  Yes   Wound Care Orders initiated:  NA      [facility] nurse consulted for Pressure Injury (Stage 3,4, Unstageable, DTI, NWPT, Complex wounds)and New or Established Ostomies:  NA      Primary Nurse eSignature: Electronically signed by Aldo Manning RN on 8/17/18 at 6:54 AM

## 2018-08-17 NOTE — PROGRESS NOTES
Bedside report received. 4 eyed skin assessment completed. No evidence of skin breakdown. Mepilex dsg on sacrum. Pt alert and oriented x4. Pt on RA SpO2 98%, NSR 80s, /50. Pt anuric. Pt currently receiving HD tx off unit. POC to d/c patient after tx with home PT/OT per Dr Christian France. Alarms on and audible. Will continue to monitor.  Ryan Graham RN

## 2018-08-17 NOTE — FLOWSHEET NOTE
Treatment time: 3 hours  Net UF: 500 ml    Pre weight: 71.3 kg   Post weight: 70.7 kg  EDW: 71 kg    Access used: Left upper arm AVF  Access function: well with  ml/min    Medications or blood products given: Venofer and Aranesp    Regular outpatient schedule: OFELIA Summary of response to treatment: good    Copy of dialysis treatment record placed in chart, to be scanned into EMR.     08/17/18 0800 08/17/18 1115   Vital Signs   BP (!) 119/50 (!) 147/35   Temp 98.1 °F (36.7 °C) 98.8 °F (37.1 °C)   Pulse 91 88   Weight 157 lb 3 oz (71.3 kg) 155 lb 13.8 oz (70.7 kg)   Weight Method Actual;Standing scale Actual;Standing scale   Percent Weight Change -0.56 -0.84   Dry Weight 156 lb 8.4 oz (71 kg) --    Pain Assessment   Pain Level --  0   Post-Hemodialysis Assessment   Post-Treatment Procedures --  Blood returned; Access bleeding time < 10 minutes   Machine Disinfection Process --  Acid/Vinegar Clean;Heat Disinfect; Exterior Machine Disinfection   Rinseback Volume (ml) --  400 ml   Total Liters Processed (l/min) --  68.1 l/min   Dialyzer Clearance --  Lightly streaked   Duration of Treatment (minutes) --  180 minutes   Heparin amount administered during treatment (units) --  0 units   Hemodialysis Intake (ml) --  700 ml   Hemodialysis Output (ml) --  1200 ml   NET Removed (ml) --  500 ml   Tolerated Treatment --  Good   Bilateral Breath Sounds Clear Clear   Edema None None

## 2018-08-17 NOTE — CARE COORDINATION
[facility]    Spoke with pt at bedside about [facility], all questions answered. Demographics verified. Orders faxed to [facility].   Anitha Sun RN CTN with [facility] [address]  FAD:[phone]

## 2018-08-17 NOTE — PROGRESS NOTES
Dialysis nurse called, she said she would wait until patient had move out orders and dialyze her at noon. Writer told her day shift RN said patient to be dialyzed in early am and then probably dc 'd home. She did not call back to confirm what her plan was. Writer told patient this.

## 2018-08-17 NOTE — PROGRESS NOTES
Physical Therapy/Occupational Therapy  Attempted to see pt for therapy. Pt currently off floor at dialysis. Will re-attempt later as schedule permits. Thank you.   Pandora Gottron, PT, DPT  Zev Riley, OTR/L

## 2018-08-19 ENCOUNTER — HOSPITAL ENCOUNTER (INPATIENT)
Age: 78
LOS: 10 days | Discharge: NURSING, REHAB OR LONG-TERM CARE | DRG: 242 | End: 2018-08-29
Attending: EMERGENCY MEDICINE | Admitting: INTERNAL MEDICINE
Payer: MEDICARE

## 2018-08-19 ENCOUNTER — APPOINTMENT (OUTPATIENT)
Dept: GENERAL RADIOLOGY | Age: 78
DRG: 242 | End: 2018-08-19
Payer: MEDICARE

## 2018-08-19 DIAGNOSIS — I21.4 NSTEMI (NON-ST ELEVATED MYOCARDIAL INFARCTION) (HCC): Primary | ICD-10-CM

## 2018-08-19 LAB
A/G RATIO: 1.6 (ref 1.1–2.2)
ALBUMIN SERPL-MCNC: 3.8 G/DL (ref 3.4–5)
ALP BLD-CCNC: 85 U/L (ref 40–129)
ALT SERPL-CCNC: 29 U/L (ref 10–40)
ANION GAP SERPL CALCULATED.3IONS-SCNC: 18 MMOL/L (ref 3–16)
APTT: 33.2 SEC (ref 26–36)
AST SERPL-CCNC: 49 U/L (ref 15–37)
BASOPHILS ABSOLUTE: 0.1 K/UL (ref 0–0.2)
BASOPHILS RELATIVE PERCENT: 1.2 %
BILIRUB SERPL-MCNC: 0.6 MG/DL (ref 0–1)
BUN BLDV-MCNC: 32 MG/DL (ref 7–20)
CALCIUM SERPL-MCNC: 9.3 MG/DL (ref 8.3–10.6)
CHLORIDE BLD-SCNC: 97 MMOL/L (ref 99–110)
CO2: 23 MMOL/L (ref 21–32)
CREAT SERPL-MCNC: 6 MG/DL (ref 0.6–1.2)
EOSINOPHILS ABSOLUTE: 0.6 K/UL (ref 0–0.6)
EOSINOPHILS RELATIVE PERCENT: 10.6 %
GFR AFRICAN AMERICAN: 8
GFR NON-AFRICAN AMERICAN: 7
GLOBULIN: 2.4 G/DL
GLUCOSE BLD-MCNC: 82 MG/DL (ref 70–99)
HCT VFR BLD CALC: 26.1 % (ref 36–48)
HEMOGLOBIN: 8.8 G/DL (ref 12–16)
LYMPHOCYTES ABSOLUTE: 0.4 K/UL (ref 1–5.1)
LYMPHOCYTES RELATIVE PERCENT: 7.1 %
MCH RBC QN AUTO: 31.7 PG (ref 26–34)
MCHC RBC AUTO-ENTMCNC: 33.5 G/DL (ref 31–36)
MCV RBC AUTO: 94.4 FL (ref 80–100)
MONOCYTES ABSOLUTE: 0.9 K/UL (ref 0–1.3)
MONOCYTES RELATIVE PERCENT: 15.4 %
NEUTROPHILS ABSOLUTE: 3.8 K/UL (ref 1.7–7.7)
NEUTROPHILS RELATIVE PERCENT: 65.7 %
PDW BLD-RTO: 13.4 % (ref 12.4–15.4)
PLATELET # BLD: 154 K/UL (ref 135–450)
PMV BLD AUTO: 9.1 FL (ref 5–10.5)
POTASSIUM SERPL-SCNC: 4 MMOL/L (ref 3.5–5.1)
RBC # BLD: 2.77 M/UL (ref 4–5.2)
SODIUM BLD-SCNC: 138 MMOL/L (ref 136–145)
TOTAL PROTEIN: 6.2 G/DL (ref 6.4–8.2)
TROPONIN: 4.11 NG/ML
WBC # BLD: 5.8 K/UL (ref 4–11)

## 2018-08-19 PROCEDURE — 6370000000 HC RX 637 (ALT 250 FOR IP): Performed by: EMERGENCY MEDICINE

## 2018-08-19 PROCEDURE — 2580000003 HC RX 258: Performed by: NURSE PRACTITIONER

## 2018-08-19 PROCEDURE — 2060000000 HC ICU INTERMEDIATE R&B

## 2018-08-19 PROCEDURE — 99285 EMERGENCY DEPT VISIT HI MDM: CPT

## 2018-08-19 PROCEDURE — 84484 ASSAY OF TROPONIN QUANT: CPT

## 2018-08-19 PROCEDURE — 96365 THER/PROPH/DIAG IV INF INIT: CPT

## 2018-08-19 PROCEDURE — 80053 COMPREHEN METABOLIC PANEL: CPT

## 2018-08-19 PROCEDURE — 93005 ELECTROCARDIOGRAM TRACING: CPT | Performed by: EMERGENCY MEDICINE

## 2018-08-19 PROCEDURE — 6360000002 HC RX W HCPCS: Performed by: STUDENT IN AN ORGANIZED HEALTH CARE EDUCATION/TRAINING PROGRAM

## 2018-08-19 PROCEDURE — 71046 X-RAY EXAM CHEST 2 VIEWS: CPT

## 2018-08-19 PROCEDURE — 96366 THER/PROPH/DIAG IV INF ADDON: CPT

## 2018-08-19 PROCEDURE — 85025 COMPLETE CBC W/AUTO DIFF WBC: CPT

## 2018-08-19 PROCEDURE — 85730 THROMBOPLASTIN TIME PARTIAL: CPT

## 2018-08-19 RX ORDER — HEPARIN SODIUM 10000 [USP'U]/100ML
12 INJECTION, SOLUTION INTRAVENOUS CONTINUOUS
Status: DISCONTINUED | OUTPATIENT
Start: 2018-08-19 | End: 2018-08-19 | Stop reason: CLARIF

## 2018-08-19 RX ORDER — ASPIRIN 81 MG/1
81 TABLET, CHEWABLE ORAL DAILY
Status: DISCONTINUED | OUTPATIENT
Start: 2018-08-20 | End: 2018-08-21

## 2018-08-19 RX ORDER — DEXTROSE MONOHYDRATE 25 G/50ML
12.5 INJECTION, SOLUTION INTRAVENOUS PRN
Status: DISCONTINUED | OUTPATIENT
Start: 2018-08-19 | End: 2018-08-29 | Stop reason: HOSPADM

## 2018-08-19 RX ORDER — DEXTROSE MONOHYDRATE 50 MG/ML
100 INJECTION, SOLUTION INTRAVENOUS PRN
Status: DISCONTINUED | OUTPATIENT
Start: 2018-08-19 | End: 2018-08-29 | Stop reason: HOSPADM

## 2018-08-19 RX ORDER — SODIUM CHLORIDE 0.9 % (FLUSH) 0.9 %
10 SYRINGE (ML) INJECTION PRN
Status: DISCONTINUED | OUTPATIENT
Start: 2018-08-19 | End: 2018-08-29 | Stop reason: HOSPADM

## 2018-08-19 RX ORDER — OXYCODONE HYDROCHLORIDE AND ACETAMINOPHEN 5; 325 MG/1; MG/1
1 TABLET ORAL EVERY 4 HOURS PRN
Status: DISCONTINUED | OUTPATIENT
Start: 2018-08-19 | End: 2018-08-23

## 2018-08-19 RX ORDER — ASPIRIN 81 MG/1
324 TABLET, CHEWABLE ORAL ONCE
Status: COMPLETED | OUTPATIENT
Start: 2018-08-19 | End: 2018-08-19

## 2018-08-19 RX ORDER — ACETAMINOPHEN 500 MG
500 TABLET ORAL EVERY 4 HOURS PRN
Status: DISCONTINUED | OUTPATIENT
Start: 2018-08-19 | End: 2018-08-22

## 2018-08-19 RX ORDER — CLOPIDOGREL BISULFATE 75 MG/1
75 TABLET ORAL DAILY
Status: DISCONTINUED | OUTPATIENT
Start: 2018-08-20 | End: 2018-08-29 | Stop reason: HOSPADM

## 2018-08-19 RX ORDER — HEPARIN SODIUM 1000 [USP'U]/ML
1815 INJECTION, SOLUTION INTRAVENOUS; SUBCUTANEOUS PRN
Status: DISCONTINUED | OUTPATIENT
Start: 2018-08-19 | End: 2018-08-20

## 2018-08-19 RX ORDER — AMLODIPINE BESYLATE 5 MG/1
10 TABLET ORAL NIGHTLY
Status: DISCONTINUED | OUTPATIENT
Start: 2018-08-19 | End: 2018-08-24

## 2018-08-19 RX ORDER — HEPARIN SODIUM 1000 [USP'U]/ML
3630 INJECTION, SOLUTION INTRAVENOUS; SUBCUTANEOUS PRN
Status: DISCONTINUED | OUTPATIENT
Start: 2018-08-19 | End: 2018-08-20

## 2018-08-19 RX ORDER — MORPHINE SULFATE 2 MG/ML
2 INJECTION, SOLUTION INTRAMUSCULAR; INTRAVENOUS
Status: DISCONTINUED | OUTPATIENT
Start: 2018-08-19 | End: 2018-08-22

## 2018-08-19 RX ORDER — SODIUM CHLORIDE 0.9 % (FLUSH) 0.9 %
10 SYRINGE (ML) INJECTION EVERY 12 HOURS SCHEDULED
Status: DISCONTINUED | OUTPATIENT
Start: 2018-08-19 | End: 2018-08-29 | Stop reason: HOSPADM

## 2018-08-19 RX ORDER — FUROSEMIDE 40 MG/1
80 TABLET ORAL 2 TIMES DAILY
Status: DISCONTINUED | OUTPATIENT
Start: 2018-08-19 | End: 2018-08-23

## 2018-08-19 RX ORDER — HEPARIN SODIUM 1000 [USP'U]/ML
3630 INJECTION, SOLUTION INTRAVENOUS; SUBCUTANEOUS ONCE
Status: COMPLETED | OUTPATIENT
Start: 2018-08-19 | End: 2018-08-19

## 2018-08-19 RX ORDER — ONDANSETRON 2 MG/ML
4 INJECTION INTRAMUSCULAR; INTRAVENOUS EVERY 6 HOURS PRN
Status: DISCONTINUED | OUTPATIENT
Start: 2018-08-19 | End: 2018-08-22

## 2018-08-19 RX ORDER — NICOTINE POLACRILEX 4 MG
15 LOZENGE BUCCAL PRN
Status: DISCONTINUED | OUTPATIENT
Start: 2018-08-19 | End: 2018-08-29 | Stop reason: HOSPADM

## 2018-08-19 RX ADMIN — Medication 10 ML: at 23:20

## 2018-08-19 RX ADMIN — HEPARIN SODIUM 7.3 ML/HR: 10000 INJECTION, SOLUTION INTRAVENOUS at 22:51

## 2018-08-19 RX ADMIN — HEPARIN SODIUM 3630 UNITS: 1000 INJECTION INTRAVENOUS; SUBCUTANEOUS at 22:51

## 2018-08-19 RX ADMIN — ASPIRIN 81 MG 324 MG: 81 TABLET ORAL at 21:14

## 2018-08-20 LAB
ANION GAP SERPL CALCULATED.3IONS-SCNC: 14 MMOL/L (ref 3–16)
APTT: 85.6 SEC (ref 26–36)
BUN BLDV-MCNC: 40 MG/DL (ref 7–20)
CALCIUM SERPL-MCNC: 8.8 MG/DL (ref 8.3–10.6)
CHLORIDE BLD-SCNC: 100 MMOL/L (ref 99–110)
CO2: 24 MMOL/L (ref 21–32)
CREAT SERPL-MCNC: 6.6 MG/DL (ref 0.6–1.2)
EKG ATRIAL RATE: 71 BPM
EKG ATRIAL RATE: 86 BPM
EKG DIAGNOSIS: NORMAL
EKG DIAGNOSIS: NORMAL
EKG P AXIS: 18 DEGREES
EKG P AXIS: 37 DEGREES
EKG P-R INTERVAL: 188 MS
EKG P-R INTERVAL: 192 MS
EKG Q-T INTERVAL: 388 MS
EKG Q-T INTERVAL: 392 MS
EKG QRS DURATION: 102 MS
EKG QRS DURATION: 102 MS
EKG QTC CALCULATION (BAZETT): 421 MS
EKG QTC CALCULATION (BAZETT): 469 MS
EKG R AXIS: -21 DEGREES
EKG R AXIS: -29 DEGREES
EKG T AXIS: 134 DEGREES
EKG T AXIS: 134 DEGREES
EKG VENTRICULAR RATE: 71 BPM
EKG VENTRICULAR RATE: 86 BPM
ESTIMATED AVERAGE GLUCOSE: 88.2 MG/DL
GFR AFRICAN AMERICAN: 7
GFR NON-AFRICAN AMERICAN: 6
GLUCOSE BLD-MCNC: 103 MG/DL (ref 70–99)
GLUCOSE BLD-MCNC: 143 MG/DL (ref 70–99)
GLUCOSE BLD-MCNC: 78 MG/DL (ref 70–99)
GLUCOSE BLD-MCNC: 84 MG/DL (ref 70–99)
HBA1C MFR BLD: 4.7 %
HCT VFR BLD CALC: 22.8 % (ref 36–48)
HEMOGLOBIN: 7.8 G/DL (ref 12–16)
INR BLD: 2.22 (ref 0.86–1.14)
MCH RBC QN AUTO: 32.1 PG (ref 26–34)
MCHC RBC AUTO-ENTMCNC: 34 G/DL (ref 31–36)
MCV RBC AUTO: 94.3 FL (ref 80–100)
PDW BLD-RTO: 13.2 % (ref 12.4–15.4)
PERFORMED ON: ABNORMAL
PERFORMED ON: ABNORMAL
PERFORMED ON: NORMAL
PLATELET # BLD: 148 K/UL (ref 135–450)
PMV BLD AUTO: 9.8 FL (ref 5–10.5)
POTASSIUM REFLEX MAGNESIUM: 4.5 MMOL/L (ref 3.5–5.1)
PROTHROMBIN TIME: 25.3 SEC (ref 9.8–13)
RBC # BLD: 2.42 M/UL (ref 4–5.2)
SODIUM BLD-SCNC: 138 MMOL/L (ref 136–145)
TROPONIN: 3.7 NG/ML
TROPONIN: 3.96 NG/ML
WBC # BLD: 5.3 K/UL (ref 4–11)

## 2018-08-20 PROCEDURE — 84484 ASSAY OF TROPONIN QUANT: CPT

## 2018-08-20 PROCEDURE — 93005 ELECTROCARDIOGRAM TRACING: CPT | Performed by: NURSE PRACTITIONER

## 2018-08-20 PROCEDURE — 5A1D70Z PERFORMANCE OF URINARY FILTRATION, INTERMITTENT, LESS THAN 6 HOURS PER DAY: ICD-10-PCS | Performed by: INTERNAL MEDICINE

## 2018-08-20 PROCEDURE — 99223 1ST HOSP IP/OBS HIGH 75: CPT | Performed by: INTERNAL MEDICINE

## 2018-08-20 PROCEDURE — 80048 BASIC METABOLIC PNL TOTAL CA: CPT

## 2018-08-20 PROCEDURE — 85027 COMPLETE CBC AUTOMATED: CPT

## 2018-08-20 PROCEDURE — 93010 ELECTROCARDIOGRAM REPORT: CPT | Performed by: INTERNAL MEDICINE

## 2018-08-20 PROCEDURE — 96366 THER/PROPH/DIAG IV INF ADDON: CPT

## 2018-08-20 PROCEDURE — 36415 COLL VENOUS BLD VENIPUNCTURE: CPT

## 2018-08-20 PROCEDURE — 85610 PROTHROMBIN TIME: CPT

## 2018-08-20 PROCEDURE — 6370000000 HC RX 637 (ALT 250 FOR IP): Performed by: INTERNAL MEDICINE

## 2018-08-20 PROCEDURE — 90937 HEMODIALYSIS REPEATED EVAL: CPT

## 2018-08-20 PROCEDURE — 6370000000 HC RX 637 (ALT 250 FOR IP): Performed by: NURSE PRACTITIONER

## 2018-08-20 PROCEDURE — 85730 THROMBOPLASTIN TIME PARTIAL: CPT

## 2018-08-20 PROCEDURE — 2580000003 HC RX 258: Performed by: NURSE PRACTITIONER

## 2018-08-20 PROCEDURE — 2060000000 HC ICU INTERMEDIATE R&B

## 2018-08-20 PROCEDURE — 83036 HEMOGLOBIN GLYCOSYLATED A1C: CPT

## 2018-08-20 RX ORDER — CINACALCET 30 MG/1
30 TABLET, FILM COATED ORAL DAILY
Status: DISCONTINUED | OUTPATIENT
Start: 2018-08-20 | End: 2018-08-29 | Stop reason: HOSPADM

## 2018-08-20 RX ORDER — PANTOPRAZOLE SODIUM 20 MG/1
20 TABLET, DELAYED RELEASE ORAL DAILY
Status: DISCONTINUED | OUTPATIENT
Start: 2018-08-20 | End: 2018-08-29 | Stop reason: HOSPADM

## 2018-08-20 RX ORDER — FLECAINIDE ACETATE 100 MG/1
50 TABLET ORAL 2 TIMES DAILY
Status: DISCONTINUED | OUTPATIENT
Start: 2018-08-20 | End: 2018-08-20

## 2018-08-20 RX ORDER — SEVELAMER CARBONATE 800 MG/1
800 TABLET, FILM COATED ORAL 2 TIMES DAILY WITH MEALS
Status: DISCONTINUED | OUTPATIENT
Start: 2018-08-20 | End: 2018-08-29 | Stop reason: HOSPADM

## 2018-08-20 RX ORDER — INSULIN GLARGINE 100 [IU]/ML
12 INJECTION, SOLUTION SUBCUTANEOUS NIGHTLY
Status: DISCONTINUED | OUTPATIENT
Start: 2018-08-20 | End: 2018-08-21

## 2018-08-20 RX ORDER — ATORVASTATIN CALCIUM 80 MG/1
80 TABLET, FILM COATED ORAL DAILY
Status: DISCONTINUED | OUTPATIENT
Start: 2018-08-20 | End: 2018-08-21

## 2018-08-20 RX ORDER — CHOLECALCIFEROL (VITAMIN D3) 10 MCG
1 TABLET ORAL DAILY
Status: DISCONTINUED | OUTPATIENT
Start: 2018-08-20 | End: 2018-08-29 | Stop reason: HOSPADM

## 2018-08-20 RX ORDER — LOSARTAN POTASSIUM 25 MG/1
25 TABLET ORAL DAILY
Status: DISCONTINUED | OUTPATIENT
Start: 2018-08-20 | End: 2018-08-24

## 2018-08-20 RX ORDER — PHYTONADIONE 5 MG/1
5 TABLET ORAL ONCE
Status: COMPLETED | OUTPATIENT
Start: 2018-08-20 | End: 2018-08-20

## 2018-08-20 RX ORDER — HYDRALAZINE HYDROCHLORIDE 25 MG/1
50 TABLET, FILM COATED ORAL 2 TIMES DAILY
Status: DISCONTINUED | OUTPATIENT
Start: 2018-08-20 | End: 2018-08-24

## 2018-08-20 RX ADMIN — SEVELAMER CARBONATE 800 MG: 800 TABLET, FILM COATED ORAL at 10:09

## 2018-08-20 RX ADMIN — METOPROLOL TARTRATE 25 MG: 25 TABLET ORAL at 21:39

## 2018-08-20 RX ADMIN — NITROGLYCERIN 0.5 INCH: 20 OINTMENT TOPICAL at 07:33

## 2018-08-20 RX ADMIN — PHYTONADIONE 5 MG: 5 TABLET ORAL at 16:17

## 2018-08-20 RX ADMIN — AMLODIPINE BESYLATE 10 MG: 5 TABLET ORAL at 21:39

## 2018-08-20 RX ADMIN — OXYCODONE HYDROCHLORIDE AND ACETAMINOPHEN 1 TABLET: 5; 325 TABLET ORAL at 21:38

## 2018-08-20 RX ADMIN — HYDRALAZINE HYDROCHLORIDE 50 MG: 25 TABLET, FILM COATED ORAL at 21:38

## 2018-08-20 RX ADMIN — OXYCODONE HYDROCHLORIDE AND ACETAMINOPHEN 1 TABLET: 5; 325 TABLET ORAL at 03:27

## 2018-08-20 RX ADMIN — SEVELAMER CARBONATE 800 MG: 800 TABLET, FILM COATED ORAL at 16:49

## 2018-08-20 RX ADMIN — NITROGLYCERIN 0.5 INCH: 20 OINTMENT TOPICAL at 17:56

## 2018-08-20 RX ADMIN — Medication 10 ML: at 10:09

## 2018-08-20 RX ADMIN — INSULIN GLARGINE 12 UNITS: 100 INJECTION, SOLUTION SUBCUTANEOUS at 21:38

## 2018-08-20 RX ADMIN — INSULIN LISPRO 1 UNITS: 100 INJECTION, SOLUTION INTRAVENOUS; SUBCUTANEOUS at 21:38

## 2018-08-20 RX ADMIN — FUROSEMIDE 80 MG: 40 TABLET ORAL at 16:49

## 2018-08-20 ASSESSMENT — PAIN DESCRIPTION - ORIENTATION: ORIENTATION: LOWER

## 2018-08-20 ASSESSMENT — PAIN DESCRIPTION - LOCATION: LOCATION: ABDOMEN

## 2018-08-20 ASSESSMENT — PAIN DESCRIPTION - PAIN TYPE: TYPE: ACUTE PAIN

## 2018-08-20 ASSESSMENT — PAIN SCALES - GENERAL
PAINLEVEL_OUTOF10: 7
PAINLEVEL_OUTOF10: 0

## 2018-08-20 ASSESSMENT — PAIN DESCRIPTION - DESCRIPTORS: DESCRIPTORS: CRAMPING

## 2018-08-20 NOTE — CARE COORDINATION
[facility]  CM made writer aware of pt's readmission. Pt was set up with [facility] upon previous dc from [facility]. Will continue to follow and arrange for [facility] needs. Should pt dc over the weekend please fax facesheet, order, KANA, and H&P to [facility].    Carleen Reeves RN CTN with [facility] [address]  MZP:[phone]

## 2018-08-20 NOTE — CONSULTS
[address] [facility]  [phone]        Reason for Consultation/Chief Complaint: \"I have been having burning pain on her back with radiation to shoulders  . \"    History of Present Illness:  Akbar Edmonds is a [de-identified] y.o. patient who presented to the hospital with complaints of burning pain on her whole back radiating to shoulders and arms when she woke up from sleep on 9.19.18. She had these symptoms for about 1.5 hrs. They are no longer present this morning. She has elevated troponin but no signs of STEMI  I have been asked to provide consultation regarding further management and testing. Patient had been admitted on 8.13.18 with chest pain and elevated troponin. Underwent coronary intervention on 8.15.18 by Dr Meka Hare:  1.  Bilateral coronary angiography. 2.  Rotablator of proximal to distal RCA. 3.  Rotablator of proximal right PDA. 4.  PCI of right PDA with Synergy drug-eluting stent. 5.  PCI of distal RCA with Synergy drug-eluting stent. 6.  PCI of mid RCA with Synergy drug-eluting stent. 7.  Temporary pacemaker placement. 8.  Ultrasound-guided left common femoral arterial access. Past Medical History:  Seen by cardiothoracic surgery by Dr Nidhi Richards 8.13.18  [de-identified] yo female with NSTEMI and multi-vessel CAD on [facility] today. On chronic HD. She is at moderate risk for mortality with CABG as outlined above based on STS Cardiac Surgery Risk Profile. I will discuss with cardiology regarding percutaneous options. \"   has a past medical history of A-fib ([address] CAD (coronary artery disease); Cardiomyopathy ([address] Diabetes mellitus ([address] GERD (gastroesophageal reflux disease); Hypertension; Renal failure (ARF), acute on chronic (HCC); and Thyroid disease. Surgical History:   has a past surgical history that includes Cholecystectomy;  Thyroid surgery; Dialysis fistula creation; eye surgery; Cardiac catheterization (08/13/2018); and Percutaneous Transluminal Coronary Angio for: PTINR  Lab Results   Component Value Date    TROPONINI 3.96 ([facility]) 08/20/2018     Chest xray  8.16.18   Mild pulmonary edema. Small left lung base opacity is nonspecific but likely represents atelectasis  and pleural fluid. Pneumonia is felt to be less likely. EKG 8.19.18  Diagnosis Sinus rhythm with Premature atrial complexesLeft ventricular hypertrophy with repolarization abnormalityAbnormal ECGWhen compared with ECG of 13-AUG-2018 08:01,Premature atrial complexes are now PresentNon-specific change in ST segment in Inferior leads   Diagnosis Normal sinus rhythmLeft ventricular hypertrophy with repolarization abnormalityAbnormal ECGWhen compared with ECG of 19-AUG-2018 19:38,Premature atrial complexes are no longer Present    Assessment  Acute pulmonary edema  CAD with elevated troponin. CKD stage 5 on dialysis  Hypertension  Diabetes mellitus2  Past history of  parox afib  Anemia may be contributing to angina  Patient Active Problem List   Diagnosis    [facility]non-ST elevated [facility]) [facility]    Coronary artery disease involving native coronary artery of native heart without angina pectoris    Diabetes mellitus ([facility]    Essential hypertension    Stage 5 chronic kidney disease on chronic dialysis ([facility]         Plan:    I had the opportunity to review the clinical symptoms and presentation of Bharath Bettencourt. I recommend that the patient undergo dialysis this am  Hold warfarin and set up cath for tomorrow  Continue heparin infusion asa and plavix. Consider switching flecainide to amiodarone or other alternatives due to CAD and 1c drug caution of proarrhythmia and increased mortality. Discussed with the attending and patient the treatment plan. I will address the patient's cardiac risk factors and adjusted pharmacologic treatment as needed. In addition, I have reinforced the need for patient directed risk factor modification.     Tobacco use was discussed with the patient and educated on the negative effects. I have asked the patient to not utilize these agents. Thank you for allowing to us to participate in the care or Carrillo Pena. Further evaluation will be based upon the patient's clinical course and testing results. All questions and concerns were addressed to the patient/family. Alternatives to my treatment were discussed. The note was completed using EMR. Every effort was made to ensure accuracy; however, inadvertent computerized transcription errors may be present.

## 2018-08-20 NOTE — CONSULTS
Pharmacy to Manage Heparin Infusion per Hospital Policy    Dx: NSTEMI/afib  Pt AdjBW = 60.5 Kg   Baseline aPTT = 33.2 sec at 1942. Low Dose Heparin Infusion  Heparin 1800 units IVP bolus followed by Heparin infusion at 7.3 ml/hr  Recheck aPTT in 6 hours @0430. Goal aPTT = 54-90 seconds.   Marylen Glasgow PharmD  8/19/2018 at 10:33 PM    Restarted heparin drip 8/21/18 @ 0900    Bolus 3570 unit bolus  Initial rate 7.1 mL/hr  Next aPTT due 8/21/18 @ 1500  MARIANO Parks OWEN [facility]     D/c changed to aggrastat  Shadia Mayorga, PharmD 8/21/2018 7:22 PM

## 2018-08-20 NOTE — FLOWSHEET NOTE
08/20/18 0800   Assessment   Charting Type Shift assessment   Neurological   Neuro (WDL) WDL   Level of Consciousness 0   Orientation Level Oriented X4   [place] Coma Scale   Eye Opening 4   Best Verbal Response 5   Best Motor Response 6   [place] Coma Scale Score 15   HEENT   HEENT (WDL) X   Right Eye Impaired vision   Left Eye Impaired vision   Voice Normal   Mucous Membrane Moist;Pink; Intact   Teeth Dentures upper   Respiratory   Respiratory (WDL) X   Respiratory Pattern Regular   Respiratory Depth Normal   Respiratory Quality/Effort Unlabored   Chest Assessment Chest expansion symmetrical   L Breath Sounds Clear;Diminished   R Breath Sounds Clear;Diminished   Breath Sounds   Right Upper Lobe Diminished   Right Middle Lobe Diminished   Right Lower Lobe Diminished   Left Upper Lobe Diminished   Left Lower Lobe Diminished   Cardiac   Cardiac (WDL) WDL   Cardiac Regularity Regular   Heart Sounds S1, S2   Cardiac Rhythm NSR   Cardiac Monitor   Telemetry Monitor On Yes   Telemetry Audible Yes   Telemetry Alarms Set Yes   Telemetry Box Number 52   Gastrointestinal   Abdominal (WDL) WDL   Bowel Sounds (All Quadrants) Active   Tenderness Soft   Passing Flatus Y   Bowel Sounds   RUQ Bowel Sounds Active   LUQ Bowel Sounds Active   RLQ Bowel Sounds Active   LLQ Bowel Sounds Active   Peripheral Vascular   Peripheral Vascular (WDL) WDL   Edema None   RLE Edema Trace   LLE Edema Trace   RLE Neurovascular Assessment   Capillary Refill Less than/equal to 3 seconds   Color Appropriate for ethnicity   Temperature Warm   R Pedal Pulse +2   Skin Color/Condition   Skin Color/Condition (WDL) WDL   Skin Integrity   Skin Integrity (WDL) X   Skin Integrity Redness  (blanchable)   Location coccyx   Musculoskeletal   Musculoskeletal (WDL) WDL   Genitourinary   Genitourinary (WDL) X  (HD patient )   Flank Tenderness No   Suprapubic Tenderness No   Dysuria No   Urine Assessment   Incontinence No   Urine Color Yellow/straw   Urine Appearance Clear   Anus/Rectum   Anus/Rectum (WDL) WDL   Psychosocial   Psychosocial (WDL) WDL

## 2018-08-20 NOTE — FLOWSHEET NOTE
08/20/18 1602   Vital Signs   Temp 98.1 °F (36.7 °C)   Temp Source Oral   Pulse 83   Heart Rate Source Monitor   Resp 18   BP (!) 151/64   BP Location Right upper arm   Level of Consciousness 0   MEWS Score 1   Patient Currently in Pain Denies   Oxygen Therapy   SpO2 95 %   O2 Device None (Room air)   pt back from dialysis. Pt alert and oriented and denies any needs at this time.

## 2018-08-20 NOTE — CONSULTS
Nephrology Consult Note  [phone]   [email or web address].        Reason for Consult:  ESRD complications    HISTORY OF PRESENT ILLNESS:                The patient is a [de-identified] y.o. female with significant past medical history of ESRD on HD MWF, CAD DMII and HTN admitted for back pain radiating to shoulder. Not associated with sob or diaphoresis. Recently in hospital with similar complaints. She has had rotablator assisted PCI . She multivessels disease. Not a candidate for surgery. This morning doing better. Free of pain. Denies nausea, vomiting, headache, chest pain, diaphoresis, light headedness or focal weakness. Past Medical History:        Diagnosis Date    A-fib [facility])     CAD (coronary artery disease) 08/2018    High grade stenosis X 2 vessels    Cardiomy[facility]     Diabetes mellitus ([facility]     GERD (gastroesophageal reflux disease)     Hypertension     Renal failure (ARF), acute on chronic (HCC)     Thyroid disease     thyroid removed. Past Surgical History:        Procedure Laterality Date    CARDIAC CATHETERIZATION  08/13/2018    3 Vessel coronary disease, referred to CABG    CHOLECYSTECTOMY      DIALYSIS FISTULA CREATION      left upper arm    EYE SURGERY      cataracts, retinal detatched, fluid removal    PTCA  08/15/2018    SHYANNE X 3 to RCA    THYROID SURGERY         Current Medications:    No current facility-administered medications on file prior to encounter.       Current Outpatient Prescriptions on File Prior to Encounter   Medication Sig Dispense Refill    clopidogrel (PLAVIX) 75 MG tablet Take 1 tablet by mouth daily 30 tablet 3    atorvastatin (LIPITOR) 80 MG tablet Take 1 tablet by mouth daily 30 tablet 3    losartan (COZAAR) 25 MG tablet Take 1 tablet by mouth daily 30 tablet 3    cinacalcet (SENSIPAR) 30 MG tablet Take 30 mg by mouth daily      insulin glargine (LANTUS) 100 UNIT/ML injection vial Inject 12 Units into the skin nightly       hydrALAZINE Date    WBC 5.3 08/20/2018    RBC 2.42 08/20/2018    HGB 7.8 08/20/2018    HCT 22.8 08/20/2018    MCV 94.3 08/20/2018    MCH 32.1 08/20/2018    MCHC 34.0 08/20/2018    RDW 13.2 08/20/2018     08/20/2018    MPV 9.8 08/20/2018     CBC with Differential:    Lab Results   Component Value Date    WBC 5.3 08/20/2018    RBC 2.42 08/20/2018    HGB 7.8 08/20/2018    HCT 22.8 08/20/2018     08/20/2018    MCV 94.3 08/20/2018    MCH 32.1 08/20/2018    MCHC 34.0 08/20/2018    RDW 13.2 08/20/2018    LYMPHOPCT 7.1 08/19/2018    MONOPCT 15.4 08/19/2018    BASOPCT 1.2 08/19/2018    MONOSABS 0.9 08/19/2018    LYMPHSABS 0.4 08/19/2018    EOSABS 0.6 08/19/2018    BASOSABS 0.1 08/19/2018     WBC:    Lab Results   Component Value Date    WBC 5.3 08/20/2018     CMP:    Lab Results   Component Value Date     08/20/2018    K 4.5 08/20/2018     08/20/2018    CO2 24 08/20/2018    BUN 40 08/20/2018    CREATININE 6.6 08/20/2018    GFRAA 7 08/20/2018    AGRATIO 1.6 08/19/2018    LABGLOM 6 08/20/2018    GLUCOSE 78 08/20/2018    PROT 6.2 08/19/2018    LABALBU 3.8 08/19/2018    CALCIUM 8.8 08/20/2018    BILITOT 0.6 08/19/2018    ALKPHOS 85 08/19/2018    AST 49 08/19/2018    ALT 29 08/19/2018     Potassium:    Lab Results   Component Value Date    K 4.5 08/20/2018     Albumin:    Lab Results   Component Value Date    LABALBU 3.8 08/19/2018     Magnesium:    Lab Results   Component Value Date    MG 2.30 08/13/2018     Phosphorus:  No results found for: PHOS    IMPRESSION/RECOMMENDATIONS:      - ESRD: on HD for last 4 years. MWF. Will arrange for HDS today. No overt sign of volume overload. -HTN: fairly well controlled. Continue losartan + hydralazine + amlodipine    - Anemia: get outpatient MAKAYLA dose    - Hyperphosphetemia: continue sevelamer    - SHPTH (secondary hyperparathyroidism): continue cinacalcet 30 mg daily.     - NSTEMI: as per card.  Resent PCI    - CAD    Thank you for allowing me to participate in the care of this patient. I will continue to follow along. Please call with questions.     Norleen Habermann, MD, FASN, [address], [address]  8/20/2018

## 2018-08-20 NOTE — PROGRESS NOTES
Hospitalist Progress Note      PCP: Laurie Montesinos MD    Date of Admission: 8/19/2018    Chief Complaint: Tingling in shoulders and burning and back    Hospital Course: [de-identified] y.o. female who presented to [facility] with S medical history of: ESRD on hemodialysis, CAD, essential hypertension, diabetes mellitus. Patient was here from 8/13-8/17/18 with same chest pain symptoms. Patient is in tonight for tingling and \"aching pain\", 5/10 to bilateral shoulders. She is also having burning across her upper chest.  Patient denies actual chest pain, shortness of breath, nausea, dizziness, diaphoresis, palpitations. She denies cough, diarrhea, fevers. She was found in ER of having a troponin of 4. ER spoke with cardiology, start a heparin drip and they will see her in the morning. She had a cardiac cath on 8/15/18 with Dr. Romario Montaño. She had Rotablator assisted PCI of proximal, distal RCA, ostial to proximal right PDA with placement of 3 drug-eluting stents. She was scheduled to have more stents placed, but did not have the needed equipment. Cardiothoracic surgery was consulted, she is currently not a candidate. \"       Subjective: still no chest pain or dyspnea. Stopped heparin gtt after noting INR.       Medications:  Reviewed    Infusion Medications    dextrose      heparin (porcine) 7.3 mL/hr (08/19/18 8931)     Scheduled Medications    atorvastatin  80 mg Oral Daily    b complex-C-folic acid  1 capsule Oral Daily    cinacalcet  30 mg Oral Daily    flecainide  50 mg Oral BID    hydrALAZINE  50 mg Oral BID    insulin glargine  12 Units Subcutaneous Nightly    losartan  25 mg Oral Daily    pantoprazole  20 mg Oral Daily    sevelamer  800 mg Oral BID WC    amLODIPine  10 mg Oral Nightly    clopidogrel  75 mg Oral Daily    sodium chloride flush  10 mL Intravenous 2 times per day    insulin lispro  0-6 Units Subcutaneous TID WC    insulin lispro  0-3 Units Subcutaneous Nightly    aspirin  81 mg Oral Daily    nitroglycerin  0.5 inch Topical 4 times per day    furosemide  80 mg Oral BID     PRN Meds: heparin (porcine), heparin (porcine), sodium chloride flush, acetaminophen, ondansetron, glucose, dextrose, glucagon (rDNA), dextrose, oxyCODONE-acetaminophen, morphine      Intake/Output Summary (Last 24 hours) at 08/20/18 7632  Last data filed at 08/20/18 0437   Gross per 24 hour   Intake                0 ml   Output                0 ml   Net                0 ml       Physical Exam Performed:    /75   Pulse 80   Temp 98.2 °F (36.8 °C) (Oral)   Resp 18   Ht 5' 3\" (1.6 m)   Wt 157 lb 14.4 oz (71.6 kg)   SpO2 93%   BMI 27.97 kg/m²     General appearance: No apparent distress, appears stated age and cooperative. HEENT: Pupils equal, round, and reactive to light. Conjunctivae/corneas clear. Neck: Supple, with full range of motion. No jugular venous distention. Trachea midline. Respiratory:  Normal respiratory effort. Clear to auscultation, bilaterally without Rales/Wheezes/Rhonchi. Cardiovascular: Regular rate and rhythm with normal S1/S2 without rubs or gallops. LUSB 3/6 systolic murmur, which seems distinct from Lt forearm fistula, +thrill/bruit. Abdomen: Soft, non-tender, non-distended with normal bowel sounds. Musculoskeletal: No clubbing, cyanosis or edema bilaterally. Full range of motion without deformity. Skin: Skin color, texture, turgor normal.  No rashes or lesions. Neurologic:  Neurovascularly intact without any focal sensory/motor deficits.  Cranial nerves: II-XII intact, grossly non-focal.  Psychiatric: Alert and oriented, thought content appropriate, normal insight  Capillary Refill: Brisk,< 3 seconds   Peripheral Pulses: +2 palpable, equal bilaterally       Labs:   Recent Labs      08/19/18 1942 08/20/18 0633   WBC  5.8  5.3   HGB  8.8*  7.8*   HCT  26.1*  22.8*   PLT  154  148     Recent Labs      08/19/18 1942 08/20/18 0633   NA  138  138   K  4.0  4.5   CL  97*  100 CO2  23  24   BUN  32*  40*   CREATININE  6.0*  6.6*   CALCIUM  9.3  8.8     Recent Labs      08/19/18 1942   AST  49*   ALT  29   BILITOT  0.6   ALKPHOS  85     Recent Labs      08/20/18   0633   INR  2.22*     Recent Labs      08/19/18 1942 08/20/18   0310   TROPONINI  4.11*  3.70*       Urinalysis:    Lab Results   Component Value Date    NITRU Negative 04/12/2015    WBCUA 3-5 04/12/2015    BACTERIA Rare 04/12/2015    RBCUA 20-50 04/12/2015    BLOODU LARGE 04/12/2015    SPECGRAV 1.015 04/12/2015    GLUCOSEU Negative 04/12/2015       Radiology:  XR CHEST STANDARD (2 VW)   Final Result   Mild pulmonary edema. Small left lung base opacity is nonspecific but likely represents atelectasis   and pleural fluid. Pneumonia is felt to be less likely. Assessment/Plan:    Active Hospital Problems    Diagnosis Date Noted    NSTEMI (non-ST elevated myocardial infarction) ([facility] [address] [facility] [I21.4] 08/13/2018    Diabetes mellitus ([facility] [address] [E11.9]     Stage 5 chronic kidney disease on chronic dialysis ([facility] [address] [facility] [N18.6, Z99.2]        [de-identified] y.o. female who presented to [facility] with S medical history of: ESRD on hemodialysis, CAD, essential hypertension, diabetes mellitus. Patient was here from 8/13-8/17/18 with same chest pain symptoms. Patient is in tonight for tingling and \"aching pain\", 5/10 to bilateral shoulders. She is also having burning across her upper chest.  Patient denies actual chest pain, shortness of breath, nausea, dizziness, diaphoresis, palpitations. She denies cough, diarrhea, fevers. She was found in ER of having a troponin of 4. ER spoke with cardiology, start a heparin drip and they will see her in the morning. She had a cardiac cath on 8/15/18 with Dr. Yonis Ortiz. She had Rotablator assisted PCI of proximal, distal RCA, ostial to proximal right PDA with placement of 3 drug-eluting stents. She was scheduled to have more stents placed, but did not have the needed equipment.   Cardiothoracic surgery was consulted, she is currently not a candidate. \"      Non-STEMI  Patient had Rotablator assisted PCI of proximal, distal RCA, and ostial to proximal right PDA with x3 SHYANNE 8/15/18 with Dr. Sharri Copeland  asa, nitro paste, Continue Plavix. Will start hep gtt if INR<2. Appreciate cardiology consult    ESRD on hemodialysis  Appreciate nephrology consult    DM (Diabetes Mellitus) - Type 2  - f/u A1c. Adjusted insulin regimen. Afib  - held warfarin on admission      DVT Prophylaxis: anticoagulated as above. Diet: Diet NPO Effective Now  Code Status: Full Code    PT/OT Eval Status: not indicated    Dispo - perhaps 8/21, pending cardiology input. She lives at home.       Nusrat Trevino MD

## 2018-08-20 NOTE — ED NOTES
Report given to [employer]. Pt admit to  bed 434. Telemetry monitor on. Pt transported to  via stretcher accompanied by [employer] and transporter. Pt's family with patient.        Preet Armstrong RN  08/20/18 4257

## 2018-08-20 NOTE — H&P
Hospital Medicine History & Physical      PCP: Jovita Velarde MD    Date of Admission: 8/19/2018    Date of Service: Pt seen/examined on 8/19/18 and Admitted to Inpatient with expected LOS greater than two midnights due to medical therapy. Chief Complaint:  Tingling in shoulders and burning and back      History Of Present Illness:      [de-identified] y.o. female who presented to [facility] with S medical history of: Stage V chronic kidney disease on chronic hemodialysis, CAD, essential hypertension, diabetes mellitus. Patient was here from 8/13-8/17/18 with same chest pain symptoms. Patient is in tonight for tingling and \"aching pain\", 5/10 to bilateral shoulders. She is also having burning across her upper chest.  Patient denies actual chest pain, shortness of breath, nausea, dizziness, diaphoresis, palpitations. She denies cough, diarrhea, fevers. She was found in ER of having a troponin of 4. ER spoke with cardiology, start a heparin drip and they will see her in the morning. She had a cardiac cath on 8/15/18 with Dr. Pam Maki. She had Rotablator assisted PCI of proximal, distal RCA, ostial to proximal right PDA with placement of 3 drug-eluting stents. She was scheduled to have more stents placed, but did not have the needed equipment. Cardiothoracic surgery was consulted, she is currently not a candidate. Dialysis at [facility] on Monday Wednesday and Friday with Dr. Van Irvin. Past Medical History:          Diagnosis Date    A-fib [facility])     CAD (coronary artery disease) 08/2018    High grade stenosis X 2 vessels    Cardiomyopathy ([facility] [address]     Diabetes mellitus ([facility] [address]     GERD (gastroesophageal reflux disease)     Hypertension     Renal failure (ARF), acute on chronic (HCC)     Thyroid disease     thyroid removed.        Past Surgical History:          Procedure Laterality Date    CARDIAC CATHETERIZATION  08/13/2018    3 Vessel coronary disease, referred to CABG    CHOLECYSTECTOMY      DIALYSIS negative for DM, CAD, Cancer, CVA. Positive as follows:    History reviewed. No pertinent family history. REVIEW OF SYSTEMS:   Pertinent positives as noted in the HPI. All other systems reviewed and negative. PHYSICAL EXAM PERFORMED:    BP (!) 148/59   Pulse 85   Temp 97.8 °F (36.6 °C) (Oral)   Resp 19   Ht 5' 3\" (1.6 m)   Wt 160 lb (72.6 kg)   SpO2 98%   BMI 28.34 kg/m²     General appearance:  No apparent distress, appears stated age and cooperative. HEENT:  Normal cephalic, atraumatic without obvious deformity. Pupils equal, round, and reactive to light. Extra ocular muscles intact. Conjunctivae/corneas clear. Neck: Supple, with full range of motion. No jugular venous distention. Trachea midline. Respiratory:  Normal respiratory effort. Clear to auscultation, bilaterally without Rales/Wheezes/Rhonchi. Cardiovascular:  Regular rate and rhythm with normal S1/S2 with murmur. Lt forearm fistula, +thrill/bruit  Abdomen: Soft, non-tender, non-distended with normal bowel sounds. Musculoskeletal:  No clubbing, cyanosis or edema bilaterally. Full range of motion without deformity. Skin: Skin color, texture, turgor normal.  No rashes or lesions. Neurologic:  Neurovascularly intact without any focal sensory/motor deficits.  Cranial nerves: II-XII intact, grossly non-focal.  Psychiatric:  Alert and oriented, thought content appropriate, normal insight  Capillary Refill: Brisk,< 3 seconds   Peripheral Pulses: +2 palpable, equal bilaterally       Labs:     Recent Labs      08/19/18 1942   WBC  5.8   HGB  8.8*   HCT  26.1*   PLT  154     Recent Labs      08/19/18 1942   NA  138   K  4.0   CL  97*   CO2  23   BUN  32*   CREATININE  6.0*   CALCIUM  9.3     Recent Labs      08/19/18 1942   AST  49*   ALT  29   BILITOT  0.6   ALKPHOS  85     Recent Labs      08/17/18 0417   INR  1.56*     Recent Labs      08/19/18 1942   TROPONINI  4.11*       Urinalysis:      Lab Results   Component Value Date

## 2018-08-20 NOTE — PROGRESS NOTES
Therapies:  · Oral Diet Orders: Renal   · Oral Diet intake: % (one meal)  · Oral Nutrition Supplement (ONS) Orders: None  · Anthropometric Measures:  · Ht: 5' 3\" (160 cm)   · Current Body Wt: 157 lb (71.2 kg)  · Ideal Body Wt: 115 lb (52.2 kg),   · BMI Classification: BMI 25.0 - 29.9 Overweight  · Comparative Standards (Estimated Nutrition Needs):  · Estimated Daily Total Kcal: 1205-3081  · Estimated Daily Protein (g): 78-94    Estimated Intake vs Estimated Needs: Intake Improving    Nutrition Risk Level: Moderate    Nutrition Interventions:   Continue current diet  Continued Inpatient Monitoring    Nutrition Evaluation:   · Evaluation: Goals set   · Goals: PO intakes of 50% or more of protein rich meals  this admission    · Monitoring: Meal Intake, Diet Tolerance, Pertinent Labs, Weight    See Adult Nutrition Doc Flowsheet for more detail.      Electronically signed by Ignacio Marshall RD, LD on 8/20/18 at 12:27 PM    Contact Number: [id number]

## 2018-08-21 LAB
ANION GAP SERPL CALCULATED.3IONS-SCNC: 15 MMOL/L (ref 3–16)
APTT: 31.3 SEC (ref 26–36)
APTT: 51.9 SEC (ref 26–36)
BUN BLDV-MCNC: 18 MG/DL (ref 7–20)
CALCIUM SERPL-MCNC: 9.5 MG/DL (ref 8.3–10.6)
CHLORIDE BLD-SCNC: 104 MMOL/L (ref 99–110)
CO2: 21 MMOL/L (ref 21–32)
CREAT SERPL-MCNC: 3.7 MG/DL (ref 0.6–1.2)
ESTIMATED AVERAGE GLUCOSE: 79.6 MG/DL
GFR AFRICAN AMERICAN: 14
GFR NON-AFRICAN AMERICAN: 12
GLUCOSE BLD-MCNC: 78 MG/DL (ref 70–99)
GLUCOSE BLD-MCNC: 79 MG/DL (ref 70–99)
GLUCOSE BLD-MCNC: 82 MG/DL (ref 70–99)
GLUCOSE BLD-MCNC: 92 MG/DL (ref 70–99)
GLUCOSE BLD-MCNC: 94 MG/DL (ref 70–99)
HBA1C MFR BLD: 4.4 %
HCT VFR BLD CALC: 24.4 % (ref 36–48)
HEMOGLOBIN: 8.2 G/DL (ref 12–16)
INR BLD: 1.49 (ref 0.86–1.14)
MCH RBC QN AUTO: 32.2 PG (ref 26–34)
MCHC RBC AUTO-ENTMCNC: 33.8 G/DL (ref 31–36)
MCV RBC AUTO: 95.4 FL (ref 80–100)
PDW BLD-RTO: 13.5 % (ref 12.4–15.4)
PERFORMED ON: NORMAL
PLATELET # BLD: 177 K/UL (ref 135–450)
PMV BLD AUTO: 9.4 FL (ref 5–10.5)
POC ACT LR: 322 SEC
POC ACT LR: 376 SEC
POC ACT LR: >400 SEC
POTASSIUM REFLEX MAGNESIUM: 4.6 MMOL/L (ref 3.5–5.1)
PROTHROMBIN TIME: 17 SEC (ref 9.8–13)
RBC # BLD: 2.56 M/UL (ref 4–5.2)
SODIUM BLD-SCNC: 140 MMOL/L (ref 136–145)
WBC # BLD: 6 K/UL (ref 4–11)

## 2018-08-21 PROCEDURE — 6360000002 HC RX W HCPCS: Performed by: INTERNAL MEDICINE

## 2018-08-21 PROCEDURE — 85027 COMPLETE CBC AUTOMATED: CPT

## 2018-08-21 PROCEDURE — 6370000000 HC RX 637 (ALT 250 FOR IP): Performed by: NURSE PRACTITIONER

## 2018-08-21 PROCEDURE — C1887 CATHETER, GUIDING: HCPCS

## 2018-08-21 PROCEDURE — 94761 N-INVAS EAR/PLS OXIMETRY MLT: CPT

## 2018-08-21 PROCEDURE — 99153 MOD SED SAME PHYS/QHP EA: CPT

## 2018-08-21 PROCEDURE — 4A023N7 MEASUREMENT OF CARDIAC SAMPLING AND PRESSURE, LEFT HEART, PERCUTANEOUS APPROACH: ICD-10-PCS | Performed by: INTERNAL MEDICINE

## 2018-08-21 PROCEDURE — 85730 THROMBOPLASTIN TIME PARTIAL: CPT

## 2018-08-21 PROCEDURE — 85610 PROTHROMBIN TIME: CPT

## 2018-08-21 PROCEDURE — 6370000000 HC RX 637 (ALT 250 FOR IP): Performed by: INTERNAL MEDICINE

## 2018-08-21 PROCEDURE — 80048 BASIC METABOLIC PNL TOTAL CA: CPT

## 2018-08-21 PROCEDURE — C9602 PERC D-E COR STENT ATHER S: HCPCS

## 2018-08-21 PROCEDURE — C1725 CATH, TRANSLUMIN NON-LASER: HCPCS

## 2018-08-21 PROCEDURE — 2700000000 HC OXYGEN THERAPY PER DAY

## 2018-08-21 PROCEDURE — C1724 CATH, TRANS ATHEREC,ROTATION: HCPCS

## 2018-08-21 PROCEDURE — C1874 STENT, COATED/COV W/DEL SYS: HCPCS

## 2018-08-21 PROCEDURE — 6360000002 HC RX W HCPCS

## 2018-08-21 PROCEDURE — 93454 CORONARY ARTERY ANGIO S&I: CPT

## 2018-08-21 PROCEDURE — 6360000004 HC RX CONTRAST MEDICATION: Performed by: INTERNAL MEDICINE

## 2018-08-21 PROCEDURE — 85347 COAGULATION TIME ACTIVATED: CPT

## 2018-08-21 PROCEDURE — 99152 MOD SED SAME PHYS/QHP 5/>YRS: CPT | Performed by: INTERNAL MEDICINE

## 2018-08-21 PROCEDURE — 027236Z DILATION OF CORONARY ARTERY, THREE ARTERIES WITH THREE DRUG-ELUTING INTRALUMINAL DEVICES, PERCUTANEOUS APPROACH: ICD-10-PCS | Performed by: INTERNAL MEDICINE

## 2018-08-21 PROCEDURE — C1760 CLOSURE DEV, VASC: HCPCS

## 2018-08-21 PROCEDURE — 2060000000 HC ICU INTERMEDIATE R&B

## 2018-08-21 PROCEDURE — C1894 INTRO/SHEATH, NON-LASER: HCPCS

## 2018-08-21 PROCEDURE — 2580000003 HC RX 258

## 2018-08-21 PROCEDURE — 92934 PR PRQ TRLUML CORONARY STENT/ATH/ANGIO ADDL BRANCH: CPT | Performed by: INTERNAL MEDICINE

## 2018-08-21 PROCEDURE — B2151ZZ FLUOROSCOPY OF LEFT HEART USING LOW OSMOLAR CONTRAST: ICD-10-PCS | Performed by: INTERNAL MEDICINE

## 2018-08-21 PROCEDURE — 2580000003 HC RX 258: Performed by: NURSE PRACTITIONER

## 2018-08-21 PROCEDURE — 2709999900 HC NON-CHARGEABLE SUPPLY

## 2018-08-21 PROCEDURE — C1769 GUIDE WIRE: HCPCS

## 2018-08-21 PROCEDURE — B2131ZZ FLUOROSCOPY OF MULTIPLE CORONARY ARTERY BYPASS GRAFTS USING LOW OSMOLAR CONTRAST: ICD-10-PCS | Performed by: INTERNAL MEDICINE

## 2018-08-21 PROCEDURE — 2580000003 HC RX 258: Performed by: INTERNAL MEDICINE

## 2018-08-21 PROCEDURE — 92933 PRQ TRLML C ATHRC ST ANGIOP1: CPT | Performed by: INTERNAL MEDICINE

## 2018-08-21 PROCEDURE — 6370000000 HC RX 637 (ALT 250 FOR IP)

## 2018-08-21 PROCEDURE — 6360000002 HC RX W HCPCS: Performed by: NURSE PRACTITIONER

## 2018-08-21 PROCEDURE — 36415 COLL VENOUS BLD VENIPUNCTURE: CPT

## 2018-08-21 PROCEDURE — B2111ZZ FLUOROSCOPY OF MULTIPLE CORONARY ARTERIES USING LOW OSMOLAR CONTRAST: ICD-10-PCS | Performed by: INTERNAL MEDICINE

## 2018-08-21 PROCEDURE — 99152 MOD SED SAME PHYS/QHP 5/>YRS: CPT

## 2018-08-21 RX ORDER — CETIRIZINE HYDROCHLORIDE 10 MG/1
5 TABLET ORAL DAILY
Status: DISCONTINUED | OUTPATIENT
Start: 2018-08-21 | End: 2018-08-29 | Stop reason: HOSPADM

## 2018-08-21 RX ORDER — SODIUM CHLORIDE 0.9 % (FLUSH) 0.9 %
10 SYRINGE (ML) INJECTION PRN
Status: DISCONTINUED | OUTPATIENT
Start: 2018-08-21 | End: 2018-08-22 | Stop reason: SDUPTHER

## 2018-08-21 RX ORDER — HEPARIN SODIUM 1000 [USP'U]/ML
60 INJECTION, SOLUTION INTRAVENOUS; SUBCUTANEOUS ONCE
Status: COMPLETED | OUTPATIENT
Start: 2018-08-21 | End: 2018-08-21

## 2018-08-21 RX ORDER — ONDANSETRON 2 MG/ML
4 INJECTION INTRAMUSCULAR; INTRAVENOUS EVERY 6 HOURS PRN
Status: DISCONTINUED | OUTPATIENT
Start: 2018-08-21 | End: 2018-08-29 | Stop reason: HOSPADM

## 2018-08-21 RX ORDER — ACETAMINOPHEN 325 MG/1
650 TABLET ORAL EVERY 4 HOURS PRN
Status: DISCONTINUED | OUTPATIENT
Start: 2018-08-21 | End: 2018-08-28

## 2018-08-21 RX ORDER — EPTIFIBATIDE 0.75 MG/ML
2 INJECTION, SOLUTION INTRAVENOUS CONTINUOUS
Status: DISCONTINUED | OUTPATIENT
Start: 2018-08-21 | End: 2018-08-21

## 2018-08-21 RX ORDER — INSULIN GLARGINE 100 [IU]/ML
6 INJECTION, SOLUTION SUBCUTANEOUS NIGHTLY
Status: DISCONTINUED | OUTPATIENT
Start: 2018-08-21 | End: 2018-08-22

## 2018-08-21 RX ORDER — HEPARIN SODIUM 1000 [USP'U]/ML
30 INJECTION, SOLUTION INTRAVENOUS; SUBCUTANEOUS PRN
Status: DISCONTINUED | OUTPATIENT
Start: 2018-08-21 | End: 2018-08-21 | Stop reason: ALTCHOICE

## 2018-08-21 RX ORDER — HEPARIN SODIUM 1000 [USP'U]/ML
60 INJECTION, SOLUTION INTRAVENOUS; SUBCUTANEOUS PRN
Status: DISCONTINUED | OUTPATIENT
Start: 2018-08-21 | End: 2018-08-21 | Stop reason: ALTCHOICE

## 2018-08-21 RX ORDER — ATORVASTATIN CALCIUM 40 MG/1
40 TABLET, FILM COATED ORAL DAILY
Status: DISCONTINUED | OUTPATIENT
Start: 2018-08-22 | End: 2018-08-29 | Stop reason: HOSPADM

## 2018-08-21 RX ORDER — EPTIFIBATIDE 2 MG/ML
180 INJECTION, SOLUTION INTRAVENOUS ONCE
Status: DISCONTINUED | OUTPATIENT
Start: 2018-08-21 | End: 2018-08-21

## 2018-08-21 RX ORDER — CLOPIDOGREL BISULFATE 75 MG/1
300 TABLET ORAL ONCE
Status: COMPLETED | OUTPATIENT
Start: 2018-08-21 | End: 2018-08-21

## 2018-08-21 RX ORDER — SODIUM CHLORIDE 0.9 % (FLUSH) 0.9 %
10 SYRINGE (ML) INJECTION EVERY 12 HOURS SCHEDULED
Status: DISCONTINUED | OUTPATIENT
Start: 2018-08-21 | End: 2018-08-22 | Stop reason: SDUPTHER

## 2018-08-21 RX ADMIN — ONDANSETRON 4 MG: 2 INJECTION INTRAMUSCULAR; INTRAVENOUS at 16:20

## 2018-08-21 RX ADMIN — LOSARTAN POTASSIUM 25 MG: 25 TABLET ORAL at 10:36

## 2018-08-21 RX ADMIN — CLOPIDOGREL BISULFATE 75 MG: 75 TABLET, FILM COATED ORAL at 10:37

## 2018-08-21 RX ADMIN — TIROFIBAN 0.07 MCG/KG/MIN: 5 INJECTION, SOLUTION INTRAVENOUS at 19:37

## 2018-08-21 RX ADMIN — NITROGLYCERIN 0.5 INCH: 20 OINTMENT TOPICAL at 00:29

## 2018-08-21 RX ADMIN — NEPHROCAP 1 MG: 1 CAP ORAL at 08:56

## 2018-08-21 RX ADMIN — AMLODIPINE BESYLATE 10 MG: 5 TABLET ORAL at 20:57

## 2018-08-21 RX ADMIN — NITROGLYCERIN 0.5 INCH: 20 OINTMENT TOPICAL at 06:17

## 2018-08-21 RX ADMIN — ASPIRIN 81 MG 81 MG: 81 TABLET ORAL at 08:54

## 2018-08-21 RX ADMIN — HEPARIN SODIUM 3570 UNITS: 1000 INJECTION INTRAVENOUS; SUBCUTANEOUS at 09:06

## 2018-08-21 RX ADMIN — HYDRALAZINE HYDROCHLORIDE 50 MG: 25 TABLET, FILM COATED ORAL at 10:36

## 2018-08-21 RX ADMIN — IOVERSOL 345 ML: 741 INJECTION INTRA-ARTERIAL; INTRAVENOUS at 17:12

## 2018-08-21 RX ADMIN — SODIUM CHLORIDE, PRESERVATIVE FREE 10 ML: 5 INJECTION INTRAVENOUS at 21:04

## 2018-08-21 RX ADMIN — FUROSEMIDE 80 MG: 40 TABLET ORAL at 20:58

## 2018-08-21 RX ADMIN — CETIRIZINE HYDROCHLORIDE 5 MG: 10 TABLET, FILM COATED ORAL at 08:56

## 2018-08-21 RX ADMIN — ATORVASTATIN CALCIUM 80 MG: 80 TABLET, FILM COATED ORAL at 08:55

## 2018-08-21 RX ADMIN — Medication 10 ML: at 20:58

## 2018-08-21 RX ADMIN — CLOPIDOGREL BISULFATE 300 MG: 75 TABLET, FILM COATED ORAL at 14:59

## 2018-08-21 RX ADMIN — METOPROLOL TARTRATE 25 MG: 25 TABLET ORAL at 10:36

## 2018-08-21 RX ADMIN — PANTOPRAZOLE SODIUM 20 MG: 20 TABLET, DELAYED RELEASE ORAL at 08:52

## 2018-08-21 RX ADMIN — OXYCODONE HYDROCHLORIDE AND ACETAMINOPHEN 1 TABLET: 5; 325 TABLET ORAL at 06:17

## 2018-08-21 RX ADMIN — CLOPIDOGREL BISULFATE 300 MG: 75 TABLET, FILM COATED ORAL at 15:21

## 2018-08-21 RX ADMIN — Medication 10 ML: at 00:29

## 2018-08-21 RX ADMIN — Medication 10 ML: at 09:00

## 2018-08-21 RX ADMIN — FUROSEMIDE 80 MG: 40 TABLET ORAL at 08:54

## 2018-08-21 RX ADMIN — HEPARIN SODIUM 12 UNITS/KG/HR: 10000 INJECTION, SOLUTION INTRAVENOUS at 09:00

## 2018-08-21 RX ADMIN — METOPROLOL TARTRATE 25 MG: 25 TABLET ORAL at 20:57

## 2018-08-21 ASSESSMENT — PAIN DESCRIPTION - ORIENTATION: ORIENTATION: RIGHT;LEFT;LOWER

## 2018-08-21 ASSESSMENT — PAIN SCALES - GENERAL
PAINLEVEL_OUTOF10: 0
PAINLEVEL_OUTOF10: 0
PAINLEVEL_OUTOF10: 8
PAINLEVEL_OUTOF10: 0

## 2018-08-21 ASSESSMENT — PAIN DESCRIPTION - LOCATION: LOCATION: HIP;BACK

## 2018-08-21 ASSESSMENT — PAIN DESCRIPTION - PAIN TYPE: TYPE: ACUTE PAIN

## 2018-08-21 NOTE — PROGRESS NOTES
Notified internationalist on call d/t no antiplatelet intravenous medication ordered post cath. Dr. Tiffanie Boo initially ordered Integrilin, but medication is not recommended in HD patients. Dr. Tiffanie Boo updated and ordered aggrastat bolus and infusion, renal dosed.

## 2018-08-21 NOTE — PROGRESS NOTES
(APRESOLINE) tablet 50 mg  50 mg Oral BID [facility]   50 mg at 08/20/18 2138    losartan (COZAAR) tablet 25 mg  25 mg Oral Daily [facility]   Stopped at 08/20/18 1007    pantoprazole (PROTONIX) tablet 20 mg  20 mg Oral Daily [facility] CNP   20 mg at 08/21/18 4202    sevelamer (RENVELA) tablet 800 mg  800 mg Oral BID [facility] - C[facility]   800 mg at 08/20/18 1649    metoprolol tartrate (LOPRESSOR) tablet 25 mg  25 mg Oral BID Chino Smart MD   25 mg at 08/20/18 2139    amLODIPine (NORVASC) tablet 10 mg  10 mg Oral Nightly [facility] - C[facility]   10 mg at 08/20/18 2139    clopidogrel (PLAVIX) tablet 75 mg  75 mg Oral Daily [facility] - C[facility]   Stopped at 08/20/18 1006    sodium chloride flush 0.9 % injection 10 mL  10 mL Intravenous 2 times per day [facility]   10 mL at 08/21/18 0029    sodium chloride flush 0.9 % injection 10 mL  10 mL Intravenous PRN [facility] CNP        acetaminophen (TYLENOL) tablet 500 mg  500 mg Oral Q4H PRN [facility], SO - DAVONTE        ondansetron [facility]) injection 4 mg  4 mg Intravenous Q6H PRN [facility] - CNP        glucose (GLUTOSE) 40 % oral gel 15 g  15 g Oral PRN [facility] SO - CNP        dextrose 50 % solution 12.5 g  12.5 g Intravenous PRN [facility], SO - CNP        glucagon (rDNA) injection 1 mg  1 mg Intramuscular PRN [facility] SO - CNP        dextrose 5 % solution  100 mL/hr Intravenous PRN [facility] SO - CNP        oxyCODONE-acetaminophen (PERCOCET) 5-325 MG per tablet 1 tablet  1 tablet Oral Q4H PRN [facility], APRN - CNP   1 tablet at 08/21/18 0617    morphine injection 2 mg  2 mg Intravenous Q2H PRN [facility], APRN - CNP        insulin lispro (HUMALOG) injection vial 0-6 Units  0-6 Units Subcutaneous TID WC Dior Jang, APRN - CNP   Stopped at 08/20/18 0800    insulin lispro (HUMALOG) injection vial 0-3 Units  0-3 Units Subcutaneous Nightly Dior Jang APRN - CNP   1 Units at 08/20/18 2138    aspirin chewable tablet 81 mg  81 mg Oral Daily Dior Jang APRN - CNP   81 mg at 08/21/18 0854    nitroglycerin (NITRO-BID) 2 % ointment 0.5 inch  0.5 inch Topical 4 times per day Dior Jang APRN - CNP   0.5 inch at 08/21/18 0617    furosemide (LASIX) tablet 80 mg  80 mg Oral BID Dior Jang APRN - CNP   80 mg at 08/21/18 0854       LAB DATA:    CBC: Lab Results   Component Value Date    WBC 6.0 08/21/2018    RBC 2.56 08/21/2018    HGB 8.2 08/21/2018    HCT 24.4 08/21/2018    MCV 95.4 08/21/2018    MCH 32.2 08/21/2018    MCHC 33.8 08/21/2018    RDW 13.5 08/21/2018     08/21/2018    MPV 9.4 08/21/2018     BMP:  Lab Results   Component Value Date     08/21/2018    K 4.6 08/21/2018     08/21/2018    CO2 21 08/21/2018    BUN 18 08/21/2018    CREATININE 3.7 08/21/2018    CALCIUM 9.5 08/21/2018    GFRAA 14 08/21/2018    LABGLOM 12 08/21/2018    GLUCOSE 79 08/21/2018     Ionized Calcium:  No results found for: IONCA  Magnesium:    Lab Results   Component Value Date    MG 2.30 08/13/2018     Phosphorus:  No results found for: PHOS  U/A:  Lab Results   Component Value Date    COLORU Yellow 04/12/2015    PHUR 6.0 04/12/2015    WBCUA 3-5 04/12/2015    RBCUA 20-50 04/12/2015    BACTERIA Rare 04/12/2015    CLARITYU Clear 04/12/2015    SPECGRAV 1.015 04/12/2015    LEUKOCYTESUR TRACE 04/12/2015    UROBILINOGEN 0.2 04/12/2015    BILIRUBINUR Negative 04/12/2015    BLOODU LARGE 04/12/2015    GLUCOSEU Negative 04/12/2015     Urine Culture:  No components found for: CURINE  Blood Culture:  No components found for: CBLOOD, CFUNGUSBL  Blood Culture from [place]:  No components found for: CBLOODLN    IMPRESSION/RECOMMENDATIONS:      - ESRD: on HD for

## 2018-08-21 NOTE — PROGRESS NOTES
Hospitalist Progress Note      PCP: Jose Hernandez MD    Date of Admission: 8/19/2018    Chief Complaint: Tingling in shoulders and burning and back    Hospital Course: [de-identified] y.o. female who presented to [facility] with S medical history of: ESRD on hemodialysis, CAD, essential hypertension, diabetes mellitus. Patient was here from 8/13-8/17/18 with same chest pain symptoms. Patient is in tonight for tingling and \"aching pain\", 5/10 to bilateral shoulders. She is also having burning across her upper chest.  Patient denies actual chest pain, shortness of breath, nausea, dizziness, diaphoresis, palpitations. She denies cough, diarrhea, fevers. She was found in ER of having a troponin of 4. ER spoke with cardiology, start a heparin drip and they will see her in the morning. She had a cardiac cath on 8/15/18 with Dr. Tory Mast. She had Rotablator assisted PCI of proximal, distal RCA, ostial to proximal right PDA with placement of 3 drug-eluting stents. She was scheduled to have more stents placed, but did not have the needed equipment. Cardiothoracic surgery was consulted, she is currently not a candidate. \"       Subjective:  Still no chest pain. INR down to 1.49, I made her NPO this AM until further input from cardiology. HD is MWF, she had it yesterday.       Medications:  Reviewed    Infusion Medications    dextrose       Scheduled Medications    atorvastatin  80 mg Oral Daily    b complex-C-folic acid  1 capsule Oral Daily    cinacalcet  30 mg Oral Daily    hydrALAZINE  50 mg Oral BID    insulin glargine  12 Units Subcutaneous Nightly    losartan  25 mg Oral Daily    pantoprazole  20 mg Oral Daily    sevelamer  800 mg Oral BID     metoprolol tartrate  25 mg Oral BID    amLODIPine  10 mg Oral Nightly    clopidogrel  75 mg Oral Daily    sodium chloride flush  10 mL Intravenous 2 times per day    insulin lispro  0-6 Units Subcutaneous TID     insulin lispro  0-3 Units Subcutaneous Nightly  aspirin  81 mg Oral Daily    nitroglycerin  0.5 inch Topical 4 times per day    furosemide  80 mg Oral BID     PRN Meds: sodium chloride flush, acetaminophen, ondansetron, glucose, dextrose, glucagon (rDNA), dextrose, oxyCODONE-acetaminophen, morphine      Intake/Output Summary (Last 24 hours) at 08/21/18 2320  Last data filed at 08/21/18 0537   Gross per 24 hour   Intake              620 ml   Output             1700 ml   Net            -1080 ml       Physical Exam Performed:    BP (!) 115/44   Pulse 78   Temp 99.5 °F (37.5 °C) (Oral)   Resp 16   Ht 5' 3\" (1.6 m)   Wt 154 lb 9.6 oz (70.1 kg)   SpO2 91%   BMI 27.39 kg/m²     General appearance: No apparent distress, appears stated age and cooperative. HEENT: Pupils equal, round, and reactive to light. Conjunctivae/corneas clear. Neck: Supple, with full range of motion. No jugular venous distention. Trachea midline. Respiratory:  Normal respiratory effort. Clear to auscultation, bilaterally without Rales/Wheezes/Rhonchi. Cardiovascular: Regular rate and rhythm with normal S1/S2 without rubs or gallops. LUSB 3/6 systolic murmur, which seems distinct from Lt forearm fistula, +thrill/bruit. Abdomen: Soft, non-tender, non-distended with normal bowel sounds. Musculoskeletal: No clubbing, cyanosis or edema bilaterally. Full range of motion without deformity. Skin: Skin color, texture, turgor normal.  No rashes or lesions. Neurologic:  Neurovascularly intact without any focal sensory/motor deficits.  Cranial nerves: II-XII intact, grossly non-focal.  Psychiatric: Alert and oriented, thought content appropriate, normal insight  Capillary Refill: Brisk,< 3 seconds   Peripheral Pulses: +2 palpable, equal bilaterally       Labs:   Recent Labs      08/19/18 1942 08/20/18   0633  08/21/18   0510   WBC  5.8  5.3  6.0   HGB  8.8*  7.8*  8.2*   HCT  26.1*  22.8*  24.4*   PLT  154  148  177     Recent Labs      08/19/18 1942 08/20/18   2268  08/21/18   0510

## 2018-08-21 NOTE — PROGRESS NOTES
Pt to CCL with CCL staff. Heparin infusion on hold for procedure. Pt verbalized understanding of procedure. Family accompanied pt. Pt on tele monitor.

## 2018-08-21 NOTE — PROCEDURES
[facility]                              CARDIAC CATHETERIZATION    PATIENT NAME: Chepe Foster                  :        1940  MED REC NO:   [id number]                          ROOM:       1322  ACCOUNT NO:   [de-identified]                           ADMIT DATE: 2018  PROVIDER:     Angel Holcomb MD      DATE OF PROCEDURE:  2018      INDICATIONS:  1. High grade calcific stenosis of proximal circumflex and ostial to  proximal LAD. 2.  Non-STEMI. PROCEDURE PERFORMED:  1.  Bilateral coronary angiography. 2.  Rotablator of proximal to mid circumflex. 3.  Rotablator of ostial to proximal LAD. 4.  PCI of proximal to mid circumflex with Synergy drug-eluting stent. 5.  PCI of ostial to proximal LAD with Synergy drug-eluting stent. 6.  Ultrasound-guided right common femoral arterial access. DESCRIPTION OF THE PROCEDURE:  The right groin was anesthetized. Under  ultrasound guidance we accessed the right common femoral artery with  micropuncture needle and placed a short 6-Swedish sheath which was then  upgraded to a 45 cm 7-Swedish sheath. We then advanced an XB3.5 guide  catheter and left coronary artery was engaged. Left coronary angiography  was completed that revealed a subtotal heavily calcified proximal circumflex  and ostial to proximal LAD. IV heparin was given at 100 units/kg. We then   loaded a Mac blue wire on a Turnpike gold catheter and revealed both   advanced to proximal circumflex. The Mac blue wire could not cross the   lesion. Therefore, we switched over to [facility] wire. The [facility] XT   wire was able to cross and it was placed in the distal OM. We then spun   the Turnpike Gold catheter. It was able to cross into the heavily   calcified subtotal lesion.   Once advancement of the Turnpike Gold halted,   we then removed the Mac blue wire and we then advanced the Rota-floppy   wire. I couldn't advance it the bigger OM branch and it was placed in the   smaller OM branch. We then advanced a 1.5 mm Rota jodi and we performed   a total of two runs. Adequate deceration of more than 30,000 rpm was noted. The Rotablator was then removed. We then exchanged out the Rota-floppy wire over the trapping balloon. After  that, we then advanced a noncompliant balloon which was 3 mm noncompliant   balloon. However, it did not deliver due to significant calcification. We then advanced the Yasmeen Hawks catheter to see if this could   help; however, balloon still did not deliver. We then also advanced a   2.5 and then 2 mm noncompliant balloon; however, were able to deliver. At this point, we then removed the Guidezilla GuideLiner extension   and we then readvanced the [facility] XT wire to use it as a parallel wire  technique. We then re-advanced the 1.5 mm balloon which was able to cross  the lesion and it was predilated. We then readvanced the 2.5 mm noncompliant  balloon. We eventually then advanced the 3 mm noncompliant balloon. It was  placed across the lesion. This balloon was inflated to almost 16   atmospheres. After this was done, we then took a 3.5 x 16 mm Synergy drug   eluting stent which was advanced and was just placed across the lesion. The   stent was deployed at nominal pressure. The stent was postdilated with a  3.5 mm noncompliant balloon and inflated to 16 to 20 atmospheres. We   then gave 200 mcg of intracoronary nitroglycerin. After the stenting was   done, we realized that there was lesion distal to the stent in mid circumflex  and then we predilated this with initially 2 and then with 3 mm noncompliant   balloon. The noncompliant balloon was inflated to 16 atmospheres. We then   tried to deliver a 2.5 mm stent; however, it did not deliver.   At this point,   as there was residual stenosis of less than 50%, we decided to abort intervention on  the LAD and circumflex. She underwent complex intervention on the RCA last  week. The stents in the RCA and right PDA were widely patent. She will   stay on P2Y12 therapy (Plavix) indefinitely due to long stented segments. Start [place] Coil and continue until INR is therapeutic on Coumadin, which she takes  for atrial fibrillation. There is no need for long term Asa to decrease the  risk of bleeding on triple therapy.         Davion Gale MD    D: 08/21/2018 15:25:34       T: 08/21/2018 16:50:07     [id number]  Job#: [id number]     Doc#: [id number]    CC:  Gloria Lynn MD

## 2018-08-21 NOTE — FLOWSHEET NOTE
1949 Pt assessed, see note for details. Pt and Pt's family verbalized understanding of plan of care to include strict bed rest.     0444 New order for 500ml bolus x1.

## 2018-08-22 LAB
ANION GAP SERPL CALCULATED.3IONS-SCNC: 14 MMOL/L (ref 3–16)
BUN BLDV-MCNC: 29 MG/DL (ref 7–20)
CALCIUM SERPL-MCNC: 9.2 MG/DL (ref 8.3–10.6)
CHLORIDE BLD-SCNC: 98 MMOL/L (ref 99–110)
CO2: 20 MMOL/L (ref 21–32)
CREAT SERPL-MCNC: 5.4 MG/DL (ref 0.6–1.2)
GFR AFRICAN AMERICAN: 9
GFR NON-AFRICAN AMERICAN: 8
GLUCOSE BLD-MCNC: 111 MG/DL (ref 70–99)
GLUCOSE BLD-MCNC: 195 MG/DL (ref 70–99)
GLUCOSE BLD-MCNC: 74 MG/DL (ref 70–99)
GLUCOSE BLD-MCNC: 90 MG/DL (ref 70–99)
INR BLD: 1.08 (ref 0.86–1.14)
PERFORMED ON: ABNORMAL
PERFORMED ON: ABNORMAL
PERFORMED ON: NORMAL
POTASSIUM REFLEX MAGNESIUM: 5.6 MMOL/L (ref 3.5–5.1)
PROTHROMBIN TIME: 12.3 SEC (ref 9.8–13)
SODIUM BLD-SCNC: 132 MMOL/L (ref 136–145)

## 2018-08-22 PROCEDURE — 2580000003 HC RX 258: Performed by: INTERNAL MEDICINE

## 2018-08-22 PROCEDURE — 6370000000 HC RX 637 (ALT 250 FOR IP): Performed by: NURSE PRACTITIONER

## 2018-08-22 PROCEDURE — 6370000000 HC RX 637 (ALT 250 FOR IP): Performed by: INTERNAL MEDICINE

## 2018-08-22 PROCEDURE — 36415 COLL VENOUS BLD VENIPUNCTURE: CPT

## 2018-08-22 PROCEDURE — G0238 OTH RESP PROC, INDIV: HCPCS

## 2018-08-22 PROCEDURE — 80048 BASIC METABOLIC PNL TOTAL CA: CPT

## 2018-08-22 PROCEDURE — 2700000000 HC OXYGEN THERAPY PER DAY

## 2018-08-22 PROCEDURE — 2060000000 HC ICU INTERMEDIATE R&B

## 2018-08-22 PROCEDURE — 85610 PROTHROMBIN TIME: CPT

## 2018-08-22 PROCEDURE — 6360000002 HC RX W HCPCS: Performed by: INTERNAL MEDICINE

## 2018-08-22 PROCEDURE — 90937 HEMODIALYSIS REPEATED EVAL: CPT

## 2018-08-22 PROCEDURE — 2580000003 HC RX 258: Performed by: NURSE PRACTITIONER

## 2018-08-22 PROCEDURE — 94761 N-INVAS EAR/PLS OXIMETRY MLT: CPT

## 2018-08-22 PROCEDURE — 99233 SBSQ HOSP IP/OBS HIGH 50: CPT | Performed by: INTERNAL MEDICINE

## 2018-08-22 RX ORDER — WARFARIN SODIUM 2 MG/1
2 TABLET ORAL
Status: COMPLETED | OUTPATIENT
Start: 2018-08-22 | End: 2018-08-22

## 2018-08-22 RX ORDER — SODIUM CHLORIDE 9 MG/ML
INJECTION, SOLUTION INTRAVENOUS CONTINUOUS
Status: DISPENSED | OUTPATIENT
Start: 2018-08-22 | End: 2018-08-22

## 2018-08-22 RX ORDER — ASPIRIN 81 MG/1
81 TABLET ORAL DAILY
Status: DISCONTINUED | OUTPATIENT
Start: 2018-08-22 | End: 2018-08-29 | Stop reason: HOSPADM

## 2018-08-22 RX ORDER — METOPROLOL SUCCINATE 25 MG/1
25 TABLET, EXTENDED RELEASE ORAL DAILY
Status: DISCONTINUED | OUTPATIENT
Start: 2018-08-22 | End: 2018-08-24

## 2018-08-22 RX ORDER — ASPIRIN 81 MG/1
81 TABLET ORAL DAILY
Qty: 10 TABLET | Refills: 0 | Status: ON HOLD | OUTPATIENT
Start: 2018-08-22 | End: 2018-11-27 | Stop reason: HOSPADM

## 2018-08-22 RX ADMIN — NEPHROCAP 1 MG: 1 CAP ORAL at 08:37

## 2018-08-22 RX ADMIN — FUROSEMIDE 80 MG: 40 TABLET ORAL at 17:14

## 2018-08-22 RX ADMIN — CINACALCET HYDROCHLORIDE 30 MG: 30 TABLET, COATED ORAL at 08:49

## 2018-08-22 RX ADMIN — CETIRIZINE HYDROCHLORIDE 5 MG: 10 TABLET, FILM COATED ORAL at 08:38

## 2018-08-22 RX ADMIN — SEVELAMER CARBONATE 800 MG: 800 TABLET, FILM COATED ORAL at 08:37

## 2018-08-22 RX ADMIN — Medication 10 ML: at 08:37

## 2018-08-22 RX ADMIN — SEVELAMER CARBONATE 800 MG: 800 TABLET, FILM COATED ORAL at 17:14

## 2018-08-22 RX ADMIN — PANTOPRAZOLE SODIUM 20 MG: 20 TABLET, DELAYED RELEASE ORAL at 08:48

## 2018-08-22 RX ADMIN — METOPROLOL SUCCINATE 25 MG: 25 TABLET, EXTENDED RELEASE ORAL at 14:37

## 2018-08-22 RX ADMIN — ACETAMINOPHEN 650 MG: 325 TABLET, FILM COATED ORAL at 23:18

## 2018-08-22 RX ADMIN — ASPIRIN 81 MG: 81 TABLET ORAL at 14:37

## 2018-08-22 RX ADMIN — INSULIN LISPRO 1 UNITS: 100 INJECTION, SOLUTION INTRAVENOUS; SUBCUTANEOUS at 21:11

## 2018-08-22 RX ADMIN — MUPIROCIN: 20 OINTMENT TOPICAL at 21:10

## 2018-08-22 RX ADMIN — CLOPIDOGREL BISULFATE 75 MG: 75 TABLET, FILM COATED ORAL at 08:37

## 2018-08-22 RX ADMIN — ATORVASTATIN CALCIUM 40 MG: 40 TABLET, FILM COATED ORAL at 21:11

## 2018-08-22 RX ADMIN — OXYCODONE HYDROCHLORIDE AND ACETAMINOPHEN 1 TABLET: 5; 325 TABLET ORAL at 14:37

## 2018-08-22 RX ADMIN — FUROSEMIDE 80 MG: 40 TABLET ORAL at 08:37

## 2018-08-22 RX ADMIN — AMLODIPINE BESYLATE 10 MG: 5 TABLET ORAL at 21:11

## 2018-08-22 RX ADMIN — WARFARIN SODIUM 2 MG: 2 TABLET ORAL at 17:45

## 2018-08-22 RX ADMIN — MUPIROCIN: 20 OINTMENT TOPICAL at 08:37

## 2018-08-22 RX ADMIN — DARBEPOETIN ALFA 60 MCG: 60 INJECTION, SOLUTION INTRAVENOUS; SUBCUTANEOUS at 12:09

## 2018-08-22 RX ADMIN — SODIUM CHLORIDE: 9 INJECTION, SOLUTION INTRAVENOUS at 02:51

## 2018-08-22 RX ADMIN — Medication 10 ML: at 21:11

## 2018-08-22 ASSESSMENT — PAIN SCALES - GENERAL
PAINLEVEL_OUTOF10: 0
PAINLEVEL_OUTOF10: 9
PAINLEVEL_OUTOF10: 9
PAINLEVEL_OUTOF10: 0
PAINLEVEL_OUTOF10: 0

## 2018-08-22 ASSESSMENT — PAIN DESCRIPTION - PAIN TYPE: TYPE: ACUTE PAIN

## 2018-08-22 ASSESSMENT — PAIN DESCRIPTION - ORIENTATION: ORIENTATION: RIGHT;LEFT

## 2018-08-22 ASSESSMENT — PAIN DESCRIPTION - LOCATION: LOCATION: LEG

## 2018-08-22 NOTE — PROGRESS NOTES
[address]                                                               [address]                                                       Zachary Stout                                                Phone Number: [phone]                                                  Fax Number: [phone]    Nephrology   Progress Note      SUBJECTIVE:  We are following this patient for ESRD complications. Doing ok. Denies nausea, vomiting, chest pain, sob or abdominal pain. No new issues over the night. Seen on HD. Scheduled Meds:   mupirocin   Nasal BID    warfarin  2 mg Oral Once    warfarin (COUMADIN) daily dosing (placeholder)   Other RX Placeholder    cetirizine  5 mg Oral Daily    atorvastatin  40 mg Oral Daily    b complex-C-folic acid  1 capsule Oral Daily    cinacalcet  30 mg Oral Daily    hydrALAZINE  50 mg Oral BID    losartan  25 mg Oral Daily    pantoprazole  20 mg Oral Daily    sevelamer  800 mg Oral BID WC    metoprolol tartrate  25 mg Oral BID    amLODIPine  10 mg Oral Nightly    clopidogrel  75 mg Oral Daily    sodium chloride flush  10 mL Intravenous 2 times per day    insulin lispro  0-6 Units Subcutaneous TID WC    insulin lispro  0-3 Units Subcutaneous Nightly    nitroglycerin  0.5 inch Topical 4 times per day    furosemide  80 mg Oral BID     Continuous Infusions:   dextrose       PRN Meds:.acetaminophen, magnesium hydroxide, ondansetron, sodium chloride flush, glucose, dextrose, glucagon (rDNA), dextrose, oxyCODONE-acetaminophen, morphine    ROS: He denies nausea, vomiting, chest, headache, focal weakness, light headedness, rash, fever, chill, constipation or diarrhea. Other ROS negative.  '  Physical Exam:    TEMPERATURE:  Current - Temp: 99 °F (37.2 °C);  Max - Temp  Av.1 °F (37.3 °C)  Min: 98.2 °F (36.8 °C)  Max: 100 °F (37.8 °C)  RESPIRATIONS RANGE: Resp  Av.4  Min: 13  Max: 22  PULSE 08/21/18 1036    losartan (COZAAR) tablet 25 mg  25 mg Oral Daily Nic Gamboa APRN - CNP   25 mg at 08/21/18 1036    pantoprazole (PROTONIX) tablet 20 mg  20 mg Oral Daily Nic Gamboa, APRN - CNP   20 mg at 08/22/18 0848    sevelamer (RENVELA) tablet 800 mg  800 mg Oral BID  SO Santos - CNP   800 mg at 08/22/18 8919    metoprolol tartrate (LOPRESSOR) tablet 25 mg  25 mg Oral BID Noe Cortez MD   25 mg at 08/21/18 2057    amLODIPine (NORVASC) tablet 10 mg  10 mg Oral Nightly SO Santos - CNP   10 mg at 08/21/18 2057    clopidogrel (PLAVIX) tablet 75 mg  75 mg Oral Daily Nic Gamboa, APRN - CNP   75 mg at 08/22/18 6048    sodium chloride flush 0.9 % injection 10 mL  10 mL Intravenous 2 times per day SO Santos - CNP   10 mL at 08/22/18 1204    sodium chloride flush 0.9 % injection 10 mL  10 mL Intravenous PRN Nic Gamboa APRN - CNP        glucose (GLUTOSE) 40 % oral gel 15 g  15 g Oral PRN Nic Gamboa APRN - CNP        dextrose 50 % solution 12.5 g  12.5 g Intravenous PRN Nic Gamboa APRN - CNP        glucagon (rDNA) injection 1 mg  1 mg Intramuscular PRN SO Santos - CNP        dextrose 5 % solution  100 mL/hr Intravenous PRN SO Santos - CNP        oxyCODONE-acetaminophen (PERCOCET) 5-325 MG per tablet 1 tablet  1 tablet Oral Q4H PRN Nic Gamboa APRN - CNP   1 tablet at 08/21/18 0617    morphine injection 2 mg  2 mg Intravenous Q2H PRN Nic Gamboa APRN - CNP        insulin lispro (HUMALOG) injection vial 0-6 Units  0-6 Units Subcutaneous TID  SO Santos - CNP   Stopped at 08/20/18 0800    insulin lispro (HUMALOG) injection vial 0-3 Units  0-3 Units Subcutaneous Nightly Bedford Kelch, APRN - CNP   1 Units at 08/20/18 2138    nitroglycerin (NITRO-BID) 2 % ointment 0.5 inch  0.5 inch Topical 4 times per day SO Cheng CNP   0.5 inch at 08/21/18 0617    furosemide (LASIX) tablet 80 mg  80 mg Oral BID SO Cheng CNP   80 mg at 08/22/18 0837       LAB DATA:    CBC:   Lab Results   Component Value Date    WBC 6.0 08/21/2018    RBC 2.56 08/21/2018    HGB 8.2 08/21/2018    HCT 24.4 08/21/2018    MCV 95.4 08/21/2018    MCH 32.2 08/21/2018    MCHC 33.8 08/21/2018    RDW 13.5 08/21/2018     08/21/2018    MPV 9.4 08/21/2018     BMP:    Lab Results   Component Value Date     08/22/2018    K 5.6 08/22/2018    CL 98 08/22/2018    CO2 20 08/22/2018    BUN 29 08/22/2018    CREATININE 5.4 08/22/2018    CALCIUM 9.2 08/22/2018    GFRAA 9 08/22/2018    LABGLOM 8 08/22/2018    GLUCOSE 74 08/22/2018     Ionized Calcium:  No results found for: IONCA  Magnesium:    Lab Results   Component Value Date    MG 2.30 08/13/2018     Phosphorus:  No results found for: PHOS  U/A:    Lab Results   Component Value Date    COLORU Yellow 04/12/2015    PHUR 6.0 04/12/2015    WBCUA 3-5 04/12/2015    RBCUA 20-50 04/12/2015    BACTERIA Rare 04/12/2015    CLARITYU Clear 04/12/2015    SPECGRAV 1.015 04/12/2015    LEUKOCYTESUR TRACE 04/12/2015    UROBILINOGEN 0.2 04/12/2015    BILIRUBINUR Negative 04/12/2015    BLOODU LARGE 04/12/2015    GLUCOSEU Negative 04/12/2015     Urine Culture:  No components found for: CURINE  Blood Culture:  No components found for: CBLOOD, CFUNGUSBL  Blood Culture from Central Line:  No components found for: CBLOODLN    IMPRESSION/RECOMMENDATIONS:      - ESRD: on HD for last 4 years. MWF. No overt sign of volume overload. Getting HD today without any complications. Reviewed UF goal. Using 2K bath. Hemodynamically stable.     -HTN: fairly well controlled.  Continue losartan + hydralazine + amlodipine     - Anemia:  Start aranesp 60 mcg q weekly     - Hyperphosphetemia: continue sevelamer     - SHPTH (secondary hyperparathyroidism): continue cinacalcet 30 mg daily.      - NSTEMI: as per card. S/p PCI to circumflex + LAD.  As per card     - CAD     - Afib: HR controlled    - DMII: fairly well controlled

## 2018-08-22 NOTE — PROGRESS NOTES
Patient with C4 orders and scheduled for HD today. Dialysis RN called at 1010 and stated they are ready for patient; report given to dialysis RN. Patient transported via bed on 1L O2 and bedside monitor by transporter.

## 2018-08-22 NOTE — PROGRESS NOTES
LIVER PROFILE:   Recent Labs      08/19/18   1942   AST  49*   ALT  29   BILITOT  0.6   ALKPHOS  85     PT/INR:   Recent Labs      08/20/18   0633  08/21/18   0510  08/22/18   1055   PROTIME  25.3*  17.0*  12.3   INR  2.22*  1.49*  1.08     APTT:   Recent Labs      08/20/18   0633  08/21/18   0510  08/21/18   1748   APTT  85.6*  31.3  51.9*     BNP:  No results for input(s): BNP in the last 72 hours. IMAGING:   Echo: 08/14/2018:  John Garcia left ventricular systolic function is mildly reduced with an ejection   fraction of 40 -45 %.   There is hypokinesis of the inferior and inferolateral walls.   Normal left ventricular size with mild concentric left ventricular   hypertrophy.   Grade II diastolic dysfunction with elevated filling pressure.   Moderate mitral regurgitation.   The left atrium is moderately dilated.   The aortic valve is thickened/calcified with decreased leaflet mobility   consistent with aortic stenosis.   By Doppler this is c/w moderate aortic stenosis. Mild aortic regurgitation.   Mild tricuspid regurgitation. TR velocity 3 M/sec.   Systolic pulmonic artery pressure (SPAP) is estimated at 45 mmHg consistent   with mild pulmonary hypertension (Right atrial pressure of 3 mmHg). Cath: 08/15/2018: IMPRESSION:  1. Successful complex Rotablator-assisted PCI of proximal, distal RCA as  well as ostial to proximal right PDA with placement of three Synergy drug  eluting stents. 2.  Residual subtotal proximal circumflex as well as the proximal LAD  Disease    Cath: 08/21/2018  IMPRESSION:  1. Successful complex Rotablator-assisted PCI of proximal to mid  circumflex with placement of Synergy drug-eluting stent. 2.  Successful complex Rotablator-assisted PCI of ostial to proximal LAD  with placement of Synergy drug-eluting stent.       Assessment:  - CAD, s/p complex rotablade assisted PCI Circ & LAD, she underwent rotablade PCI RCA last week (RCA stents patent on cath)  - Chronic combined systolic and

## 2018-08-22 NOTE — CARE COORDINATION
Chart review completed. Patient a [de-identified]year old female, admitted for NSTEMI. Patient transferred to ICU following cardiac cath with PCI. Current plans are for patient to return home at discharge with [facility]. Please advise of any additional needs as they arise.   Jeffry Cabrera RN

## 2018-08-22 NOTE — PROGRESS NOTES
Patient returned from dialysis. She reports she has leg pain bilaterally 9/10. Patient is stating she will not go home today as she will be in too much pain. She states she was in pain like this last time after her stents and it lasted 48 hours and then she was fine.

## 2018-08-22 NOTE — CARE COORDINATION
Writer made aware discharge cancelled due to drowsiness. Will continue to follow.   Susana Herman RN

## 2018-08-22 NOTE — DISCHARGE SUMMARY
Hospital Medicine PROGRESS NOTE      Patient ID: Gurjit Benítez      Patient's PCP: Zahra Dow MD    Admit Date: 8/19/2018     Discharge Date:   08/22/18     Admitting Physician: Fuad Condon MD     Discharge Physician: Pricila Fajardo MD     Discharge Diagnoses: Active Hospital Problems    Diagnosis Date Noted    Paroxysmal atrial fibrillation (HCC) [I48.0]     NSTEMI (non-ST elevated [facility] infarction) ([facility] [I21.4] 08/13/2018    Diabetes mellitus ([facility] [E11.9]     Stage 5 chronic kidney disease on chronic dialysis ([facility] [N18.6, Z99.2]        The patient was seen and examined on day of discharge and this discharge summary is in conjunction with any daily progress note from day of discharge. Hospital Course:  \"78 y.o. female who presented to [facility] with S medical history of: ESRD on hemodialysis, CAD, essential hypertension, diabetes mellitus.  Patient was here from 8/13-8/17/18 with same chest pain symptoms. Patient is in tonight for tingling and \"aching pain\", 5/10 to bilateral shoulders.  She is also having burning across her upper chest.  Patient denies actual chest pain, shortness of breath, nausea, dizziness, diaphoresis, palpitations.  She denies cough, diarrhea, fevers.  She was found in ER of having a troponin of 4.  ER spoke with cardiology, start a heparin drip and they will see her in the morning.  She had a cardiac cath on 8/15/18 with Dr. Naya Alexandre. Neda Lo had Rotablator assisted PCI of proximal, distal RCA, ostial to proximal right PDA with placement of 3 drug-eluting stents. She was scheduled to have more stents placed, but did not have the needed equipment. Tamea Radish surgery was consulted, she is currently not a candidate. \"        Non-STEMI  Patient had previous Rotablator assisted PCI of proximal, distal RCA, and ostial to proximal right PDA with x3 SHYANNE 8/15/18 with Dr. Naya Alexandre  Clopidogrel, statin, metoprolol, amlodipine  [facility] 8/21, had rotablator-assisted SHYANNE's to prox-mid Cx and ostial-prox LAD. - discussed with cardiology - they do not want long term triple therapy with aspirin, clopidogrel, and warfarin. The plan is to discharge with both aspirin and clopidogrel until warfarin is therapeutic, and then stop aspirin and just continue warfarin and clopidogrel indefinitely. Discharge with 10 days of aspirin, can stop when INR >2.     ESRD on hemodialysis MWF  Appreciate nephrology consult     HTN  - amlodipine, losartan, hydralazine, metoprolol     DM (Diabetes Mellitus) - Type 2  - A1c misleadingly low at 4.7. Adjusted insulin regimen.     Afib  - held warfarin on admission, resumed post-PCI        Physical Exam Performed:     BP (!) 113/54   Pulse 64   Temp 98 °F (36.7 °C)   Resp 18   Ht 5' 3\" (1.6 m)   Wt 162 lb 4.1 oz (73.6 kg)   SpO2 97%   BMI 28.74 kg/m²       General appearance: No apparent distress, appears stated age and cooperative. HEENT: Pupils equal, round, and reactive to light. Conjunctivae/corneas clear. Neck: Supple, with full range of motion. No jugular venous distention. Trachea midline. Respiratory:  Normal respiratory effort. Clear to auscultation, bilaterally without Rales/Wheezes/Rhonchi. Cardiovascular: Regular rate and rhythm with normal S1/S2 without rubs or gallops. LUSB 3/6 systolic murmur, which seems distinct from Lt forearm fistula, +thrill/bruit. Abdomen: Soft, non-tender, non-distended with normal bowel sounds. Musculoskeletal: No clubbing, cyanosis or edema bilaterally. Full range of motion without deformity. Skin: Skin color, texture, turgor normal.  No rashes or lesions. Neurologic:  Neurovascularly intact without any focal sensory/motor deficits. Cranial nerves: II-XII intact, grossly non-focal.  Psychiatric: Alert and oriented, thought content appropriate, normal insight  Capillary Refill: Brisk,< 3 seconds   Peripheral Pulses: +2 palpable, equal bilaterally        Labs:  For convenience and continuity at follow-up the following most recent labs are provided:      CBC:    Lab Results   Component Value Date    WBC 6.0 08/21/2018    HGB 8.2 08/21/2018    HCT 24.4 08/21/2018     08/21/2018       Renal:    Lab Results   Component Value Date     08/22/2018    K 5.6 08/22/2018    CL 98 08/22/2018    CO2 20 08/22/2018    BUN 29 08/22/2018    CREATININE 5.4 08/22/2018    CALCIUM 9.2 08/22/2018         Significant Diagnostic Studies    Radiology:   XR CHEST STANDARD (2 VW)   Final Result   Mild pulmonary edema. Small left lung base opacity is nonspecific but likely represents atelectasis   and pleural fluid. Pneumonia is felt to be less likely. Consults:     IP CONSULT TO CARDIOLOGY  IP CONSULT TO HOSPITALIST  IP CONSULT TO NEPHROLOGY  PHARMACY TO DOSE MEDICATION  IP CONSULT TO CARDIAC REHAB  IP CONSULT TO PHARMACY  IP CONSULT TO HOME CARE NEEDS    Disposition:  Home with [place] [id number]     Condition at Discharge: Stable    Discharge Instructions/Follow-up:  Follow up with PCP within 1-2 weeks. Follow up with nephrology and dialysis per the usual routine. Follow up with cardiology per their instructions, typically in 2-3 weeks. Stop taking aspirin when your INR is higher than 2.     Code Status:  Full Code     Activity: activity as tolerated    Diet: renal diet      Discharge Medications:     Current Discharge Medication List           Details   aspirin 81 MG EC tablet Take 1 tablet by mouth daily for 10 days  Qty: 10 tablet, Refills: 0              Details   clopidogrel (PLAVIX) 75 MG tablet Take 1 tablet by mouth daily  Qty: 30 tablet, Refills: 3      atorvastatin (LIPITOR) 80 MG tablet Take 1 tablet by mouth daily  Qty: 30 tablet, Refills: 3      losartan (COZAAR) 25 MG tablet Take 1 tablet by mouth daily  Qty: 30 tablet, Refills: 3      cinacalcet (SENSIPAR) 30 MG tablet Take 30 mg by mouth daily      insulin glargine (LANTUS) 100 UNIT/ML injection vial Inject 12 Units into the skin nightly hydrALAZINE (APRESOLINE) 25 MG tablet Take 50 mg by mouth 3 times daily 50 in am, 25 in afternoon and 50 in pm      sevelamer (RENVELA) 800 MG tablet Take 1 tablet by mouth 2 times daily (with meals)      flecainide (TAMBOCOR) 50 MG tablet Take 50 mg by mouth 2 times daily      furosemide (LASIX) 40 MG tablet Take 80 mg by mouth 2 times daily       B Complex-C-Folic Acid (FLAVIA CAPS PO) Take  by mouth daily. amlodipine (NORVASC) 10 MG tablet Take 10 mg by mouth nightly       warfarin (COUMADIN) 2 MG tablet Take 2 mg by mouth daily at 1800       Omeprazole (PRILOSEC PO) Take 20 mg by mouth daily                Time Spent on discharge is more than 30 minutes in the examination, evaluation, counseling and review of medications and discharge plan. Signed:    Jeremy Rodriguez MD   8/22/2018      Thank you Tonils Mercer MD for the opportunity to be involved in this patient's care. If you have any questions or concerns please feel free to contact me at [phone].

## 2018-08-22 NOTE — CARE COORDINATION
CASE MANAGEMENT DISCHARGE SUMMARY      Discharge to: Home with [address] ordered/agency: none    Transportation: private   Family/car: at bedside to transport home    Notified: Patient just returning from HD, family at bedside and notified of discharge plans. Facility/Agency: [facility] fax[facility]    RN: Charlotte Brown RN aware of discharge plans.         Julio César Gamboa RN

## 2018-08-22 NOTE — PROGRESS NOTES
start a heparin drip and they will see her in the morning. She had a cardiac cath on 8/15/18 with Dr. Chepe Sauceda. She had Rotablator assisted PCI of proximal, distal RCA, ostial to proximal right PDA with placement of 3 drug-eluting stents. She was scheduled to have more stents placed, but did not have the needed equipment. Cardiothoracic surgery was consulted, she is currently not a candidate. \"      Non-STEMI  Patient had previous Rotablator assisted PCI of proximal, distal RCA, and ostial to proximal right PDA with x3 SHYANNE 8/15/18 with Dr. Chepe Sauceda  Clopidogrel, statin, metoprolol, amlodipine  [facility] 8/21, had rotablator-assisted SHYANNE's to prox-mid Cx and ostial-prox LAD. ESRD on hemodialysis MWF  Appreciate nephrology consult    HTN  - amlodipine, losartan, hydralazine, metoprolol    DM (Diabetes Mellitus) - Type 2  - A1c misleadingly low at 4.7. Adjusted insulin regimen. Afib  - held warfarin on admission, resumed post-PCI      DVT Prophylaxis: anticoagulated as above. Diet: DIET CARDIAC;  Code Status: Full Code    PT/OT Eval Status: not indicated    Dispo - perhaps 8/22-23, pending cardiology input. She lives at home, will need to resume [address].       Manuel Doe MD

## 2018-08-23 ENCOUNTER — APPOINTMENT (OUTPATIENT)
Dept: GENERAL RADIOLOGY | Age: 78
DRG: 242 | End: 2018-08-23
Payer: MEDICARE

## 2018-08-23 LAB
ANION GAP SERPL CALCULATED.3IONS-SCNC: 14 MMOL/L (ref 3–16)
BASOPHILS ABSOLUTE: 0 K/UL (ref 0–0.2)
BASOPHILS RELATIVE PERCENT: 1.1 %
BUN BLDV-MCNC: 17 MG/DL (ref 7–20)
CALCIUM SERPL-MCNC: 9.3 MG/DL (ref 8.3–10.6)
CHLORIDE BLD-SCNC: 96 MMOL/L (ref 99–110)
CO2: 22 MMOL/L (ref 21–32)
CREAT SERPL-MCNC: 3.5 MG/DL (ref 0.6–1.2)
EOSINOPHILS ABSOLUTE: 0.1 K/UL (ref 0–0.6)
EOSINOPHILS RELATIVE PERCENT: 1.8 %
GFR AFRICAN AMERICAN: 15
GFR NON-AFRICAN AMERICAN: 13
GLUCOSE BLD-MCNC: 133 MG/DL (ref 70–99)
GLUCOSE BLD-MCNC: 139 MG/DL (ref 70–99)
GLUCOSE BLD-MCNC: 168 MG/DL (ref 70–99)
GLUCOSE BLD-MCNC: 251 MG/DL (ref 70–99)
HCT VFR BLD CALC: 24 % (ref 36–48)
HEMOGLOBIN: 7.8 G/DL (ref 12–16)
INR BLD: 1.07 (ref 0.86–1.14)
LACTIC ACID: 1 MMOL/L (ref 0.4–2)
LYMPHOCYTES ABSOLUTE: 0.4 K/UL (ref 1–5.1)
LYMPHOCYTES RELATIVE PERCENT: 8.2 %
MCH RBC QN AUTO: 31.5 PG (ref 26–34)
MCHC RBC AUTO-ENTMCNC: 32.4 G/DL (ref 31–36)
MCV RBC AUTO: 97.2 FL (ref 80–100)
MONOCYTES ABSOLUTE: 0.6 K/UL (ref 0–1.3)
MONOCYTES RELATIVE PERCENT: 13.3 %
NEUTROPHILS ABSOLUTE: 3.4 K/UL (ref 1.7–7.7)
NEUTROPHILS RELATIVE PERCENT: 75.6 %
PDW BLD-RTO: 14.4 % (ref 12.4–15.4)
PERFORMED ON: ABNORMAL
PLATELET # BLD: 172 K/UL (ref 135–450)
PMV BLD AUTO: 9.7 FL (ref 5–10.5)
POTASSIUM REFLEX MAGNESIUM: 4.6 MMOL/L (ref 3.5–5.1)
PROTHROMBIN TIME: 12.2 SEC (ref 9.8–13)
RBC # BLD: 2.47 M/UL (ref 4–5.2)
SODIUM BLD-SCNC: 132 MMOL/L (ref 136–145)
WBC # BLD: 4.5 K/UL (ref 4–11)

## 2018-08-23 PROCEDURE — 97162 PT EVAL MOD COMPLEX 30 MIN: CPT

## 2018-08-23 PROCEDURE — 2060000000 HC ICU INTERMEDIATE R&B

## 2018-08-23 PROCEDURE — 71045 X-RAY EXAM CHEST 1 VIEW: CPT

## 2018-08-23 PROCEDURE — 6370000000 HC RX 637 (ALT 250 FOR IP): Performed by: NURSE PRACTITIONER

## 2018-08-23 PROCEDURE — 97166 OT EVAL MOD COMPLEX 45 MIN: CPT

## 2018-08-23 PROCEDURE — 2580000003 HC RX 258

## 2018-08-23 PROCEDURE — 2580000003 HC RX 258: Performed by: INTERNAL MEDICINE

## 2018-08-23 PROCEDURE — 87184 SC STD DISK METHOD PER PLATE: CPT

## 2018-08-23 PROCEDURE — G8987 SELF CARE CURRENT STATUS: HCPCS

## 2018-08-23 PROCEDURE — 87801 DETECT AGNT MULT DNA AMPLI: CPT

## 2018-08-23 PROCEDURE — 2700000000 HC OXYGEN THERAPY PER DAY

## 2018-08-23 PROCEDURE — 2580000003 HC RX 258: Performed by: NURSE PRACTITIONER

## 2018-08-23 PROCEDURE — 36415 COLL VENOUS BLD VENIPUNCTURE: CPT

## 2018-08-23 PROCEDURE — 87077 CULTURE AEROBIC IDENTIFY: CPT

## 2018-08-23 PROCEDURE — 97530 THERAPEUTIC ACTIVITIES: CPT

## 2018-08-23 PROCEDURE — 97116 GAIT TRAINING THERAPY: CPT

## 2018-08-23 PROCEDURE — 6370000000 HC RX 637 (ALT 250 FOR IP): Performed by: INTERNAL MEDICINE

## 2018-08-23 PROCEDURE — G8979 MOBILITY GOAL STATUS: HCPCS

## 2018-08-23 PROCEDURE — 6360000002 HC RX W HCPCS: Performed by: INTERNAL MEDICINE

## 2018-08-23 PROCEDURE — G8978 MOBILITY CURRENT STATUS: HCPCS

## 2018-08-23 PROCEDURE — G8988 SELF CARE GOAL STATUS: HCPCS

## 2018-08-23 PROCEDURE — 94761 N-INVAS EAR/PLS OXIMETRY MLT: CPT

## 2018-08-23 PROCEDURE — 85025 COMPLETE CBC W/AUTO DIFF WBC: CPT

## 2018-08-23 PROCEDURE — 83605 ASSAY OF LACTIC ACID: CPT

## 2018-08-23 PROCEDURE — 87186 SC STD MICRODIL/AGAR DIL: CPT

## 2018-08-23 PROCEDURE — 85610 PROTHROMBIN TIME: CPT

## 2018-08-23 PROCEDURE — 80048 BASIC METABOLIC PNL TOTAL CA: CPT

## 2018-08-23 PROCEDURE — 87040 BLOOD CULTURE FOR BACTERIA: CPT

## 2018-08-23 RX ORDER — OXYCODONE HYDROCHLORIDE AND ACETAMINOPHEN 5; 325 MG/1; MG/1
0.5 TABLET ORAL EVERY 8 HOURS PRN
Status: DISCONTINUED | OUTPATIENT
Start: 2018-08-23 | End: 2018-08-25

## 2018-08-23 RX ORDER — FUROSEMIDE 40 MG/1
80 TABLET ORAL 2 TIMES DAILY
Status: DISCONTINUED | OUTPATIENT
Start: 2018-08-23 | End: 2018-08-24

## 2018-08-23 RX ORDER — WARFARIN SODIUM 2 MG/1
2 TABLET ORAL
Status: COMPLETED | OUTPATIENT
Start: 2018-08-23 | End: 2018-08-23

## 2018-08-23 RX ORDER — SODIUM CHLORIDE 9 MG/ML
INJECTION, SOLUTION INTRAVENOUS
Status: COMPLETED
Start: 2018-08-23 | End: 2018-08-23

## 2018-08-23 RX ADMIN — SODIUM CHLORIDE 250 ML: 9 INJECTION, SOLUTION INTRAVENOUS at 02:05

## 2018-08-23 RX ADMIN — INSULIN LISPRO 1 UNITS: 100 INJECTION, SOLUTION INTRAVENOUS; SUBCUTANEOUS at 20:26

## 2018-08-23 RX ADMIN — CLOPIDOGREL BISULFATE 75 MG: 75 TABLET, FILM COATED ORAL at 09:02

## 2018-08-23 RX ADMIN — ASPIRIN 81 MG: 81 TABLET ORAL at 09:02

## 2018-08-23 RX ADMIN — CINACALCET HYDROCHLORIDE 30 MG: 30 TABLET, COATED ORAL at 09:02

## 2018-08-23 RX ADMIN — Medication 10 ML: at 09:02

## 2018-08-23 RX ADMIN — NEPHROCAP 1 MG: 1 CAP ORAL at 09:02

## 2018-08-23 RX ADMIN — ATORVASTATIN CALCIUM 40 MG: 40 TABLET, FILM COATED ORAL at 20:20

## 2018-08-23 RX ADMIN — HYDRALAZINE HYDROCHLORIDE 50 MG: 25 TABLET, FILM COATED ORAL at 20:19

## 2018-08-23 RX ADMIN — VANCOMYCIN HYDROCHLORIDE 1000 MG: 1 INJECTION, POWDER, LYOPHILIZED, FOR SOLUTION INTRAVENOUS at 09:02

## 2018-08-23 RX ADMIN — Medication 10 ML: at 20:20

## 2018-08-23 RX ADMIN — CEFEPIME HYDROCHLORIDE 1 G: 1 INJECTION, POWDER, FOR SOLUTION INTRAMUSCULAR; INTRAVENOUS at 02:04

## 2018-08-23 RX ADMIN — SEVELAMER CARBONATE 800 MG: 800 TABLET, FILM COATED ORAL at 09:01

## 2018-08-23 RX ADMIN — ACETAMINOPHEN 650 MG: 325 TABLET, FILM COATED ORAL at 09:04

## 2018-08-23 RX ADMIN — INSULIN LISPRO 1 UNITS: 100 INJECTION, SOLUTION INTRAVENOUS; SUBCUTANEOUS at 11:12

## 2018-08-23 RX ADMIN — MUPIROCIN: 20 OINTMENT TOPICAL at 09:01

## 2018-08-23 RX ADMIN — WARFARIN SODIUM 2 MG: 2 TABLET ORAL at 18:38

## 2018-08-23 RX ADMIN — ACETAMINOPHEN 650 MG: 325 TABLET, FILM COATED ORAL at 05:13

## 2018-08-23 RX ADMIN — FUROSEMIDE 80 MG: 40 TABLET ORAL at 18:37

## 2018-08-23 RX ADMIN — AMLODIPINE BESYLATE 10 MG: 5 TABLET ORAL at 20:19

## 2018-08-23 RX ADMIN — PANTOPRAZOLE SODIUM 20 MG: 20 TABLET, DELAYED RELEASE ORAL at 09:02

## 2018-08-23 RX ADMIN — MUPIROCIN: 20 OINTMENT TOPICAL at 22:00

## 2018-08-23 RX ADMIN — SEVELAMER CARBONATE 800 MG: 800 TABLET, FILM COATED ORAL at 18:37

## 2018-08-23 RX ADMIN — CETIRIZINE HYDROCHLORIDE 5 MG: 10 TABLET, FILM COATED ORAL at 09:02

## 2018-08-23 ASSESSMENT — PAIN SCALES - GENERAL
PAINLEVEL_OUTOF10: 0
PAINLEVEL_OUTOF10: 0
PAINLEVEL_OUTOF10: 5

## 2018-08-23 NOTE — PROGRESS NOTES
Pharmacy to Dose Warfarin     Dx: AFib  [id number] Score for Atrial Fibrillation Stroke Risk 6  Goal INR range 2-3  Home Warfarin dose: 2mg daily     Date                 INR                 Warfarin  8/22                 1.18  2mg       8/23  1.07  2mg     Recommend Warfarin 2mg tonight x1. Daily INR ordered. Rx will continue to manage therapy per consult order.     Beverly Weinberg.  Jame MoreiraD, BCPS   8/22/2018 9:54 AM

## 2018-08-23 NOTE — PROGRESS NOTES
Patient assessment complete and charted. Temp 102.5. AOx4. Speech slurred at times; mumbling phrases. Bed locked and in lowest position. Non-skid socks in place. Call light within reach. Bed alarm on. Patient states no further needs at this time. Will continue to monitor.

## 2018-08-23 NOTE — CONSULTS
Pharmacy to Dose Vancomycin    Dx: empiric coverage  Goal trough = 15-20 mcg/mL for now until etiology determined  Pt wt = 72.1 kg  ESRD on HD MWF    Vancomycin 1000mg IVPB x 1 now. Random Vancomycin level tomorrow AM.    Peng Sanchez.  Jame MoreiraD, BCPS   8/23/2018 8:26 AM

## 2018-08-23 NOTE — PROGRESS NOTES
Pt has developed a fever of 102. 1.  Dr Long Almaguer was contacted about fever and want blood cultures drawn and started pt on cefepime

## 2018-08-23 NOTE — PROGRESS NOTES
with cardiology, start a heparin drip and they will see her in the morning. She had a cardiac cath on 8/15/18 with Dr. Leonard Lao. She had Rotablator assisted PCI of proximal, distal RCA, ostial to proximal right PDA with placement of 3 drug-eluting stents. She was scheduled to have more stents placed, but did not have the needed equipment. Cardiothoracic surgery was consulted, she is currently not a candidate. \"      Non-STEMI  Patient had previous Rotablator assisted PCI of proximal, distal RCA, and ostial to proximal right PDA with x3 SHYANNE 8/15/18 with Dr. Leonard Lao  Clopidogrel, statin, metoprolol, amlodipine  [facility] 8/21, had rotablator-assisted SHYANNE's to prox-mid Cx and ostial-prox LAD. Fevers, starting overnight on 8/22, up to 102.1  - f/u blood cultures and CXR. No localizing symptoms. Vanc and cefepime started empirically 8/23. ESRD on hemodialysis MWF  Appreciate nephrology consult    HTN  - amlodipine, losartan, hydralazine, metoprolol    DM (Diabetes Mellitus) - Type 2  - A1c misleadingly low at 4.7. Adjusted insulin regimen. Afib  - held warfarin on admission, resumed post-PCI      DVT Prophylaxis: anticoagulated as above. Diet: DIET CARDIAC;  Code Status: Full Code    PT/OT Eval Status: eval pending    Dispo - perhaps 8/25 if no further fevers. She lives at home, will need to resume [facility].       Mari James MD

## 2018-08-23 NOTE — PROGRESS NOTES
(degrees)  LUE AROM : WFL  Left Hand AROM (degrees)  Left Hand AROM: WFL  RUE AROM (degrees)  RUE AROM : WFL  Right Hand AROM (degrees)  Right Hand AROM: WFL    Assessment   Performance deficits / Impairments: Decreased functional mobility ; Decreased safe awareness;Decreased balance;Decreased ADL status; Decreased endurance;Decreased high-level IADLs  Assessment: Pt reports high PLOF, completing mobility in and out of her home without a device, as well as ADL. Pt presents at [facility] for functional mobility and transfers with use of RW, limited by pain and endurance, requiring max cues for safety. Continue OT tx. Prognosis: Good  Decision Making: Medium Complexity  Patient Education: role of OT, safety   REQUIRES OT FOLLOW UP: Yes  Activity Tolerance  Activity Tolerance: Patient limited by fatigue;Patient limited by pain  Safety Devices  Safety Devices in place: Yes  Type of devices: Left in chair;Call light within reach;Gait belt;Nurse notified (no alarm on arrival, okay per RN)         Plan   Plan  Times per week: 4-5x/week   Current Treatment Recommendations: Strengthening, Balance Training, Functional Mobility Training, Endurance Training, Patient/Caregiver Education & Training, Equipment Evaluation, Education, & procurement, Self-Care / ADL, Safety Education & Training    G-Code  OT G-codes  Functional Assessment Tool Used: AM-PAC   Score: 16  Functional Limitation: Self care  Self Care Current Status (): At least 40 percent but less than 60 percent impaired, limited or restricted  Self Care Goal Status ():  At least 20 percent but less than 40 percent impaired, limited or restricted                AM-PAC Score  AM-PAC Inpatient Daily Activity Raw Score: 16  AM-PAC Inpatient ADL T-Scale Score : 35.96  ADL Inpatient CMS 0-100% Score: 53.32  ADL Inpatient CMS G-Code Modifier : CK    Goals  Short term goals  Time Frame for Short term goals: 1 week  Short term goal 1: Pt will complete functional

## 2018-08-23 NOTE — PROGRESS NOTES
Physical Therapy    Facility/Department: [facility] C2 CARD TELEMETRY  Initial Assessment    NAME: Michelle Guo  : 1940  MRN: [id number]    Date of Service: 2018    Discharge Recommendations:  Subacute/Skilled Nursing Facility   PT Equipment Recommendations  Equipment Needed: No    Patient Diagnosis(es): The encounter diagnosis was NSTEMI (non-ST elevated myocardial infarction) ([facility] has a past medical history of A-fib [facility]); CAD (coronary artery disease); Cardiomyopathy ([facility] Diabetes mellitus [facility] GERD (gastroesophageal reflux disease); Hypertension; Renal failure (ARF), acute on chronic (HCC); and Thyroid disease. has a past surgical history that includes Cholecystectomy; Thyroid surgery; Dialysis fistula creation; eye surgery; Cardiac catheterization (2018); and Percutaneous Transluminal Coronary Angio (08/15/2018).     Restrictions  Restrictions/Precautions  Restrictions/Precautions: Fall Risk, General Precautions  Position Activity Restriction  Other position/activity restrictions: activity as tolerated  Vision/Hearing  Vision: Impaired  Vision Exceptions: Wears glasses at all times (reports impaired depth perception)  Hearing: Within functional limits     Subjective  General  Chart Reviewed: Yes  Patient assessed for rehabilitation services?: Yes  Response To Previous Treatment: Not applicable  Family / Caregiver Present: Yes (son)  Referring Practitioner: Dr. Thelma Piña MD  Referral Date : 18  Diagnosis: NSTEMI; s/p B cardiac cath with Rotablator-assisted PCI and temporary pacemaker placement on 8/15/18; Cardiac cath 18  Follows Commands: Within Functional Limits  General Comment  Comments: Pt up in chair on approach; RN cleared pt for therapy  Subjective  Subjective: pt agreeable to therapy  Pain Screening  Patient Currently in Pain:  (denies pain states discomfort on left side)       Orientation  Orientation  Overall Orientation Status: Within Functional limitations: Decreased functional mobility ; Decreased endurance;Decreased coordination;Decreased balance;Decreased strength;Decreased safe awareness;Decreased cognition  Assessment: Pt is [de-identified] yo female who presents with diagnosis of NSTEMI. Pt recently with multiple cardiac caths. Mod A for gait this date due to poor safety awareness and quick fatigue. Pt min A to stand. Pt would benefit from continued skilled therapy to address deficits. Recommend SNF at d/c due to pt demonstrating poor safety awareness, assist required for mobility, and does not have 24-hr SUP at home. Treatment Diagnosis: decreased (I) with mobility  Specific instructions for Next Treatment: progress mobility as tolerated  Prognosis: Good  Decision Making: Medium Complexity  Patient Education: Role of PT, safety with mobility, POC, d/c recs; pt verbalized understanding  Barriers to Learning: none  REQUIRES PT FOLLOW UP: Yes  Activity Tolerance  Activity Tolerance: Patient Tolerated treatment well  Activity Tolerance: SpO2 94% on room air post ambulation; HR 77         Plan   Plan  Times per week: 5-7 days/wk while in ICU  Times per day: Daily  Specific instructions for Next Treatment: progress mobility as tolerated  Current Treatment Recommendations: Strengthening, Neuromuscular Re-education, Home Exercise Program, ROM, Safety Education & Training, Balance Training, Endurance Training, Functional Mobility Training, Transfer Training, Gait Training, Stair training  Safety Devices  Type of devices: All fall risk precautions in place, Call light within reach, Gait belt, Patient at risk for falls, Nurse notified, Left in chair (no chair alarm on approach)    G-Code  PT G-Codes  Functional Assessment Tool Used: AMPAC  Score: 16  Functional Limitation: Mobility: Walking and moving around  Mobility: Walking and Moving Around Current Status ():  At least 40 percent but less than 60 percent impaired, limited or restricted  Mobility: Walking and Moving Around Goal Status (): At least 20 percent but less than 40 percent impaired, limited or restricted                  AM-PAC Score  AM-PAC Inpatient Mobility Raw Score : 16  AM-PAC Inpatient T-Scale Score : 40.78  Mobility Inpatient CMS 0-100% Score: 54.16  Mobility Inpatient CMS G-Code Modifier : CK          Goals  Short term goals  Time Frame for Short term goals: 1 week  Short term goal 1: Pt will be SBA for bed mobility. Short term goal 2: Pt will be SBA for sit<>stand and bed<>Chair transfers. Short term goal 3: Pt will ambulate 120 ft with CGA, good safety awareness, and RW. Short term goal 4: 8/27/18; Pt will participate in 12-15 reps of BLE exercises to promote strength and activity tolerance.   Patient Goals   Patient goals : \"to go home\"       Therapy Time   Individual Concurrent Group Co-treatment   Time In 1130         Time Out 1150         Minutes 20         Timed Code Treatment Minutes: Sarita, [address] [facility], DPT #[id number]

## 2018-08-24 LAB
ANION GAP SERPL CALCULATED.3IONS-SCNC: 13 MMOL/L (ref 3–16)
BUN BLDV-MCNC: 28 MG/DL (ref 7–20)
CALCIUM SERPL-MCNC: 8.8 MG/DL (ref 8.3–10.6)
CHLORIDE BLD-SCNC: 96 MMOL/L (ref 99–110)
CO2: 22 MMOL/L (ref 21–32)
CREAT SERPL-MCNC: 5 MG/DL (ref 0.6–1.2)
GFR AFRICAN AMERICAN: 10
GFR NON-AFRICAN AMERICAN: 8
GLUCOSE BLD-MCNC: 132 MG/DL (ref 70–99)
GLUCOSE BLD-MCNC: 204 MG/DL (ref 70–99)
GLUCOSE BLD-MCNC: 243 MG/DL (ref 70–99)
HCT VFR BLD CALC: 22.5 % (ref 36–48)
HEMOGLOBIN: 7.3 G/DL (ref 12–16)
INR BLD: 1.27 (ref 0.86–1.14)
MCH RBC QN AUTO: 31 PG (ref 26–34)
MCHC RBC AUTO-ENTMCNC: 32.6 G/DL (ref 31–36)
MCV RBC AUTO: 95.1 FL (ref 80–100)
PDW BLD-RTO: 13.7 % (ref 12.4–15.4)
PERFORMED ON: ABNORMAL
PERFORMED ON: ABNORMAL
PLATELET # BLD: 142 K/UL (ref 135–450)
PMV BLD AUTO: 9.7 FL (ref 5–10.5)
POTASSIUM REFLEX MAGNESIUM: 4.4 MMOL/L (ref 3.5–5.1)
PROTHROMBIN TIME: 14.5 SEC (ref 9.8–13)
RBC # BLD: 2.37 M/UL (ref 4–5.2)
SODIUM BLD-SCNC: 131 MMOL/L (ref 136–145)
VANCOMYCIN RANDOM: 13.5 UG/ML
WBC # BLD: 4.8 K/UL (ref 4–11)

## 2018-08-24 PROCEDURE — 85610 PROTHROMBIN TIME: CPT

## 2018-08-24 PROCEDURE — 90937 HEMODIALYSIS REPEATED EVAL: CPT

## 2018-08-24 PROCEDURE — 6360000002 HC RX W HCPCS: Performed by: INTERNAL MEDICINE

## 2018-08-24 PROCEDURE — 80048 BASIC METABOLIC PNL TOTAL CA: CPT

## 2018-08-24 PROCEDURE — 5A1D70Z PERFORMANCE OF URINARY FILTRATION, INTERMITTENT, LESS THAN 6 HOURS PER DAY: ICD-10-PCS | Performed by: INTERNAL MEDICINE

## 2018-08-24 PROCEDURE — 2580000003 HC RX 258: Performed by: INTERNAL MEDICINE

## 2018-08-24 PROCEDURE — 85027 COMPLETE CBC AUTOMATED: CPT

## 2018-08-24 PROCEDURE — 6370000000 HC RX 637 (ALT 250 FOR IP): Performed by: INTERNAL MEDICINE

## 2018-08-24 PROCEDURE — 6370000000 HC RX 637 (ALT 250 FOR IP): Performed by: NURSE PRACTITIONER

## 2018-08-24 PROCEDURE — 36415 COLL VENOUS BLD VENIPUNCTURE: CPT

## 2018-08-24 PROCEDURE — 2580000003 HC RX 258: Performed by: NURSE PRACTITIONER

## 2018-08-24 PROCEDURE — 80202 ASSAY OF VANCOMYCIN: CPT

## 2018-08-24 PROCEDURE — 2060000000 HC ICU INTERMEDIATE R&B

## 2018-08-24 PROCEDURE — 87040 BLOOD CULTURE FOR BACTERIA: CPT

## 2018-08-24 PROCEDURE — 97110 THERAPEUTIC EXERCISES: CPT

## 2018-08-24 RX ORDER — METOPROLOL SUCCINATE 25 MG/1
25 TABLET, EXTENDED RELEASE ORAL DAILY
Status: DISCONTINUED | OUTPATIENT
Start: 2018-08-24 | End: 2018-08-26

## 2018-08-24 RX ORDER — WARFARIN SODIUM 2 MG/1
2 TABLET ORAL
Status: COMPLETED | OUTPATIENT
Start: 2018-08-24 | End: 2018-08-24

## 2018-08-24 RX ORDER — HYDRALAZINE HYDROCHLORIDE 25 MG/1
25 TABLET, FILM COATED ORAL 2 TIMES DAILY
Status: DISCONTINUED | OUTPATIENT
Start: 2018-08-24 | End: 2018-08-24

## 2018-08-24 RX ADMIN — ATORVASTATIN CALCIUM 40 MG: 40 TABLET, FILM COATED ORAL at 21:22

## 2018-08-24 RX ADMIN — WARFARIN SODIUM 2 MG: 2 TABLET ORAL at 18:23

## 2018-08-24 RX ADMIN — MUPIROCIN: 20 OINTMENT TOPICAL at 21:22

## 2018-08-24 RX ADMIN — VANCOMYCIN HYDROCHLORIDE 500 MG: 500 INJECTION, POWDER, LYOPHILIZED, FOR SOLUTION INTRAVENOUS at 12:23

## 2018-08-24 RX ADMIN — SEVELAMER CARBONATE 800 MG: 800 TABLET, FILM COATED ORAL at 18:23

## 2018-08-24 RX ADMIN — INSULIN LISPRO 2 UNITS: 100 INJECTION, SOLUTION INTRAVENOUS; SUBCUTANEOUS at 17:39

## 2018-08-24 RX ADMIN — Medication 10 ML: at 21:23

## 2018-08-24 RX ADMIN — INSULIN LISPRO 1 UNITS: 100 INJECTION, SOLUTION INTRAVENOUS; SUBCUTANEOUS at 21:22

## 2018-08-24 RX ADMIN — CEFEPIME HYDROCHLORIDE 1 G: 1 INJECTION, POWDER, FOR SOLUTION INTRAMUSCULAR; INTRAVENOUS at 03:11

## 2018-08-24 RX ADMIN — METOPROLOL SUCCINATE 25 MG: 25 TABLET, EXTENDED RELEASE ORAL at 14:44

## 2018-08-24 ASSESSMENT — PAIN SCALES - GENERAL
PAINLEVEL_OUTOF10: 0
PAINLEVEL_OUTOF10: 0

## 2018-08-24 NOTE — FLOWSHEET NOTE
08/24/18 0949 08/24/18 1257   Treatment   Time On 0923 --    Time Off --  1225   Treatment Goal 1000 --    Observations & Evaluations   Level of Consciousness 0 0   Oriented X 3 3   Vital Signs   /67 127/64   Temp 98.8 °F (37.1 °C) 98.2 °F (36.8 °C)   Weight 156 lb 1.4 oz (70.8 kg) 154 lb 15.7 oz (70.3 kg)   Weight Method Bed scale Bed scale   Percent Weight Change -1.8 -0.71   Pain Assessment   Pain Assessment 0-10 0-10   Pain Level 0 0   Access   Needle Size Used 15 --    Technical Checks   RO Machine Log Sheet Completed Yes --    Machine Alarm Self Test Completed --    Machine Autotest Completed --    Air Foam Detector Tested --    Extracorporeal Circuit Tested for Integrity Yes --    Treatment Initiation   Dialyze Hours 3 --    Treatment  Initiation Universal Precautions maintained;Lines secured to patient; Connections secured;Prime given;Venous Parameters set; Arterial Parameters set; Air foam detector engaged; Saline line double clamped;Dialyzer;F160 --    During Hemodialysis Assessment   Blood Flow Rate (ml/min) 400 ml/min --    Ultrafiltration Rate (ml/hr) 330 ml/hr --    Arterial Pressure (mmHg) -240 mmHg --    Venous Pressure (mmHg) 130 --    TMP 30 --     --    Access Visible Yes --    Dialysis Bath   K+ (Potassium) 3 2   Ca+ (Calcium) 2.5 --    Na+ (Sodium) 138 --    HCO3 (Bicarb) 32 --    Hemodialysis (linkable) Arteriovenous fistula Left Forearm   No Placement Date or Time found. Access Type: Arteriovenous fistula  Orientation: Left  Access Location: Forearm   Site Assessment Clean;Dry; Intact Clean;Dry; Intact   Thrill Present --    Bruit Present --    Access Interventions Aseptic Technique --    Dressing Intervention Dressing reinforced --    Dressing Status Clean;Dry; Intact Clean;Dry; Intact   Post-Hemodialysis Assessment   Post-Treatment Procedures --  Blood returned; Access bleeding time > 10 minutes   Machine Disinfection Process --  Acid/Vinegar Clean;Heat Disinfect; Exterior Machine Disinfection   Rinseback Volume (ml) --  400 ml   Total Liters Processed (l/min) --  63.6 l/min   Dialyzer Clearance --  Lightly streaked   Duration of Treatment (minutes) --  210 minutes   Hemodialysis Intake (ml) --  400 ml   Hemodialysis Output (ml) --  986 ml   NET Removed (ml) --  586 ml   Tolerated Treatment --  Good

## 2018-08-24 NOTE — PROGRESS NOTES
Occupational Therapy  Attempted to see pt for OT/PT follow-ups, with pt in dialysis at this time. Will re-attempt as schedule permits.      Jaden Moya, OTR/L  [facility], PT, DPT

## 2018-08-24 NOTE — PROGRESS NOTES
Received bedside report from off going RN. Pts skin was assessed. Turned and repositioned. Orders verified. Pt A&O able to follow commands. Call light in reach, bed in lowest position, will continue to monitor.

## 2018-08-24 NOTE — CARE COORDINATION
Writer met with patient and family at bedside. SNF choices are 1. [facility], 2. [facility], 3. [facility]. Preference is this order unless they can not offer a private room. Writer spoke to Augusto Toure at [facility] and they are reviewing now.   Mitchell Tate RN

## 2018-08-24 NOTE — PROGRESS NOTES
[address]                                                               [facility] [address]                                                       Zachary Stout                                                Phone Number: [phone]                                                  Fax Number: [phone]    Nephrology   Progress Note      SUBJECTIVE:  We are following this patient for ESRD complications. Doing ok. Denies nausea, vomiting, chest pain, sob or abdominal pain. No new issues over the night. Seen on HD.     Scheduled Meds:   vancomycin  500 mg Intravenous Once    warfarin  2 mg Oral Once    cefepime  1 g Intravenous Q24H    furosemide  80 mg Oral BID    vancomycin (VANCOCIN) intermittent dosing (placeholder)   Other RX Placeholder    mupirocin   Nasal BID    warfarin (COUMADIN) daily dosing (placeholder)   Other RX Placeholder    darbepoetin cleo-polysorbate  60 mcg Subcutaneous Weekly    metoprolol succinate  25 mg Oral Daily    aspirin  81 mg Oral Daily    cetirizine  5 mg Oral Daily    atorvastatin  40 mg Oral Daily    b complex-C-folic acid  1 capsule Oral Daily    cinacalcet  30 mg Oral Daily    hydrALAZINE  50 mg Oral BID    losartan  25 mg Oral Daily    pantoprazole  20 mg Oral Daily    sevelamer  800 mg Oral BID WC    amLODIPine  10 mg Oral Nightly    clopidogrel  75 mg Oral Daily    sodium chloride flush  10 mL Intravenous 2 times per day    insulin lispro  0-6 Units Subcutaneous TID WC    insulin lispro  0-3 Units Subcutaneous Nightly    nitroglycerin  0.5 inch Topical 4 times per day     Continuous Infusions:   dextrose       PRN Meds:.oxyCODONE-acetaminophen, acetaminophen, magnesium hydroxide, ondansetron, sodium chloride flush, glucose, dextrose, glucagon (rDNA), dextrose    ROS: He denies nausea, vomiting, chest, headache, focal weakness, light headedness, rash, fever, chill, constipation or flush 0.9 % injection 10 mL  10 mL Intravenous PRN Lauren Poot, APRN - CNP        glucose (GLUTOSE) 40 % oral gel 15 g  15 g Oral PRN Lauren Poot, APRN - CNP        dextrose 50 % solution 12.5 g  12.5 g Intravenous PRN Lauern Poot, APRN - CNP        glucagon (rDNA) injection 1 mg  1 mg Intramuscular PRN Lauren Poot, APRN - CNP        dextrose 5 % solution  100 mL/hr Intravenous PRN Lauren Poot, APRN - CNP        insulin lispro (HUMALOG) injection vial 0-6 Units  0-6 Units Subcutaneous TID WC Lauren Poot, APRN - CNP   Stopped at 08/24/18 0859    insulin lispro (HUMALOG) injection vial 0-3 Units  0-3 Units Subcutaneous Nightly Lauren Poot, APRN - CNP   1 Units at 08/23/18 2026    nitroglycerin (NITRO-BID) 2 % ointment 0.5 inch  0.5 inch Topical 4 times per day Lauren Poot, APRN - CNP   Stopped at 08/23/18 1101       LAB DATA:    CBC:   Lab Results   Component Value Date    WBC 4.8 08/24/2018    RBC 2.37 08/24/2018    HGB 7.3 08/24/2018    HCT 22.5 08/24/2018    MCV 95.1 08/24/2018    MCH 31.0 08/24/2018    MCHC 32.6 08/24/2018    RDW 13.7 08/24/2018     08/24/2018    MPV 9.7 08/24/2018     BMP:    Lab Results   Component Value Date     08/24/2018    K 4.4 08/24/2018    CL 96 08/24/2018    CO2 22 08/24/2018    BUN 28 08/24/2018    CREATININE 5.0 08/24/2018    CALCIUM 8.8 08/24/2018    GFRAA 10 08/24/2018    LABGLOM 8 08/24/2018    GLUCOSE 132 08/24/2018     Ionized Calcium:  No results found for: IONCA  Magnesium:    Lab Results   Component Value Date    MG 2.30 08/13/2018     Phosphorus:  No results found for: PHOS  U/A:    Lab Results   Component Value Date    COLORU Yellow 04/12/2015    PHUR 6.0 04/12/2015    WBCUA 3-5 04/12/2015    RBCUA 20-50 04/12/2015    BACTERIA Rare 04/12/2015    CLARITYU Clear 04/12/2015    SPECGRAV 1.015 04/12/2015    LEUKOCYTESUR TRACE 04/12/2015    UROBILINOGEN 0.2 04/12/2015    BILIRUBINUR Negative 04/12/2015    BLOODU LARGE 04/12/2015    GLUCOSEU Negative 04/12/2015     Urine Culture:  No components found for: CURINE  Blood Culture:  No components found for: CBLOOD, CFUNGUSBL  Blood Culture from Central Line:  No components found for: CBLOODLN    Current Facility-Administered Medications   Medication Dose Route Frequency Provider Last Rate Last Dose    vancomycin (VANCOCIN) 500 mg in dextrose 5 % 100 mL IVPB  500 mg Intravenous Once Simona Marie MD        warfarin (COUMADIN) tablet 2 mg  2 mg Oral Once Simona Marie MD        cefepime (MAXIPIME) 1 g IVPB minibag  1 g Intravenous Q24H Eduin Savage MD   Stopped at 08/24/18 0341    furosemide (LASIX) tablet 80 mg  80 mg Oral BID Simona Marie MD   Stopped at 08/24/18 0859    oxyCODONE-acetaminophen (PERCOCET) 5-325 MG per tablet 0.5 tablet  0.5 tablet Oral Q8H PRN Simona Marie MD        vancomycin (VANCOCIN) intermittent dosing (placeholder)   Other RX Placeholder Simona Marie MD        mupirocin (BACTROBAN) 2 % ointment   Nasal BID Simona Marie MD   Stopped at 08/24/18 0859    warfarin (COUMADIN) daily dosing (placeholder)   Other RX Davey Marie MD        darbepoetin cleo-polysorbate (ARANESP) injection 60 mcg  60 mcg Subcutaneous Weekly Nehal Heck MD   60 mcg at 08/22/18 1209    metoprolol succinate (TOPROL XL) extended release tablet 25 mg  25 mg Oral Daily Simona Marie MD   Stopped at 08/23/18 0834    aspirin EC tablet 81 mg  81 mg Oral Daily Simona Marie MD   Stopped at 08/24/18 0859    cetirizine (ZYRTEC) tablet 5 mg  5 mg Oral Daily Simona Marie MD   Stopped at 08/24/18 0859    atorvastatin (LIPITOR) tablet 40 mg  40 mg Oral Daily Chapo Nickerson MD   40 mg at 08/23/18 2020    acetaminophen (TYLENOL) tablet 650 mg  650 mg Oral Q4H PRN Chapo Nickerson MD   650 mg at 08/23/18 0904    magnesium hydroxide (MILK OF MAGNESIA) 400 MG/5ML suspension 30 mL  30 mL Oral Daily PRN Guillermo Rice MD        ondansetron [facility]) injection 4 mg  4 mg Intravenous Q6H PRN Guillermo Rice MD        b complex-C-folic acid (NEPHROCAPS) capsule 1 mg  1 capsule Oral Daily Bernestine Patch, APRN - CNP   Stopped at 08/24/18 0859    cinacalcet (SENSIPAR) tablet 30 mg  30 mg Oral Daily Bernestine Patch, APRN - CNP   Stopped at 08/24/18 7077    hydrALAZINE (APRESOLINE) tablet 50 mg  50 mg Oral BID Ildefonso Segura MD   Stopped at 08/24/18 0859    losartan (COZAAR) tablet 25 mg  25 mg Oral Daily Ildefonso Segura MD   Stopped at 08/23/18 6329    pantoprazole (PROTONIX) tablet 20 mg  20 mg Oral Daily Bernestine Patch, APRN - CNP   Stopped at 08/24/18 0859    sevelamer (RENVELA) tablet 800 mg  800 mg Oral BID WC Bernestine Patch, APRN - CNP   Stopped at 08/24/18 0859    amLODIPine (NORVASC) tablet 10 mg  10 mg Oral Nightly Ildefonso Segura MD   10 mg at 08/23/18 2019    clopidogrel (PLAVIX) tablet 75 mg  75 mg Oral Daily Bernestine Patch, APRN - CNP   Stopped at 08/24/18 0045    sodium chloride flush 0.9 % injection 10 mL  10 mL Intravenous 2 times per day Bernestine Patch, APRN - CNP   Stopped at 08/24/18 0859    sodium chloride flush 0.9 % injection 10 mL  10 mL Intravenous PRN Bernestine Patch, APRN - CNP        glucose (GLUTOSE) 40 % oral gel 15 g  15 g Oral PRN Bernestine Patch, APRN - CNP        dextrose 50 % solution 12.5 g  12.5 g Intravenous PRN Bernestine Patch, APRN - CNP        glucagon (rDNA) injection 1 mg  1 mg Intramuscular PRN Bernestine Patch, APRN - CNP        dextrose 5 % solution  100 mL/hr Intravenous PRN Bernestine Patch, APRN - CNP        insulin lispro (HUMALOG) injection vial 0-6 Units  0-6 Units Subcutaneous TID WC Bernestine Patch, APRN - CNP   Stopped at 08/24/18 0859    insulin lispro (HUMALOG) injection vial 0-3 Units  0-3 Units Subcutaneous Nightly Bernestine Patch, APRN - CNP   1 Units at 08/23/18 2026    nitroglycerin (NITRO-BID) 2 % ointment 0.5 inch  0.5 inch Topical 4 times per day SO Alcantar - CNP   Stopped at 08/23/18 1101       IMPRESSION/RECOMMENDATIONS:      - ESRD: on HD for last 4 years. MWF. No overt sign of volume overload. Getting HD today without any complications. Reviewed UF goal. Change bath to 2K. Hemodynamically stable.     -HTN: fairly well controlled. Continue losartan + hydralazine + amlodipine. Decrease hydralazine dose to 25 mg BID.    - Anemia:  Started aranesp 60 mcg q weekly     - Hyperphosphetemia: continue sevelamer     - SHPTH (secondary hyperparathyroidism): continue cinacalcet 30 mg daily.      - NSTEMI: as per card. S/p PCI to circumflex + LAD. As per card     - Fever: started on broad spectrum ABx. Blood culture growing staphylococcus coagulase negative. As per primary.  Can d/c cefepime    - CAD     - Afib: HR controlled    - DMII: fairly well controlled

## 2018-08-24 NOTE — FLOWSHEET NOTE
2000 Pt assessed, see note for details. Pt return to bed with assist x1, Pt tolerated well. Fresh water at bedside. Pt denies needs, encouraged call light use. 0005 Assessment complete, see note for details. 0320 Pt assessed, see note for details. Pt denies needs.

## 2018-08-24 NOTE — PROGRESS NOTES
Nutrition Assessment    Type and Reason for Visit: Reassess    Nutrition Recommendations:   Continue cardiac diet  Monitor further education needs p/t discharge. Malnutrition Assessment:  · Malnutrition Status: No malnutrition    Nutrition Diagnosis:   · Problem: Increased nutrient needs (protein)  · Etiology: related to Increased demand for energy/nutrients due to     Signs and symptoms:  as evidenced by Known losses from dialysis    Nutrition Assessment:  · Subjective Assessment: Follow up: Pt remains in the ICU. Having HD today. Reports no issues with appetite. Frustrated that she was unable to order breakfast d/t dialysis. Reported no questions about diet/nutrition. Encouraged pt to order next meal when HD about near complete as it will take ~45 minutes to revieve tray. Will monitor. · Nutrition-Focused Physical Findings: trace BLE edema  · Wound Type: None  · Current Nutrition Therapies:  · Oral Diet Orders: Cardiac   · Oral Diet intake: %  · Oral Nutrition Supplement (ONS) Orders: None  · Anthropometric Measures:  · Ht: 5' 3\" (160 cm)   · Current Body Wt: 158 lb (71.7 kg)  · Ideal Body Wt: 115 lb (52.2 kg)   · BMI Classification: BMI 25.0 - 29.9 Overweight  · Comparative Standards (Estimated Nutrition Needs):  · Estimated Daily Total Kcal: 4383-9185  · Estimated Daily Protein (g): 78-94    Estimated Intake vs Estimated Needs: Intake Improving    Nutrition Risk Level: Moderate    Nutrition Interventions:   Continue current diet  Continued Inpatient Monitoring    Nutrition Evaluation:   · Evaluation: Progressing toward goals   · Goals: PO intakes of 50% or more of protein rich meals  this admission    · Monitoring: Meal Intake, Diet Tolerance, Pertinent Labs, Weight    See Adult Nutrition Doc Flowsheet for more detail. Electronically signed by Maricarmen Cline RD, LD on 8/24/18 at 9:33 AM    Contact Number: [phone]

## 2018-08-24 NOTE — PROGRESS NOTES
awareness;Decreased cognition  Assessment: Pt fatigued after dialysis, only able to tolerate supine exercises at this time. Pt requires verbal and TCs to attend to task, min A to complete full ROM of BLEs. Treatment Diagnosis: decreased (I) with mobility  Specific instructions for Next Treatment: progress mobility as tolerated  Prognosis: Good  Patient Education: benefits of ther ex/ROM  REQUIRES PT FOLLOW UP: Yes  Activity Tolerance  Activity Tolerance: Other;Patient limited by fatigue  Activity Tolerance: lethargy, fatigued after dialysis      Goals  Short term goals  Time Frame for Short term goals: 1 week  Short term goal 1: Pt will be SBA for bed mobility. Short term goal 2: Pt will be SBA for sit<>stand and bed<>Chair transfers. Short term goal 3: Pt will ambulate 120 ft with CGA, good safety awareness, and RW. Short term goal 4: 8/27/18; Pt will participate in 12-15 reps of BLE exercises to promote strength and activity tolerance.   Patient Goals   Patient goals : \"to go home\"    Plan    Plan  Times per week: 5-7 days/wk while in ICU  Times per day: Daily  Specific instructions for Next Treatment: progress mobility as tolerated  Current Treatment Recommendations: Strengthening, Neuromuscular Re-education, Home Exercise Program, ROM, Safety Education & Training, Balance Training, Endurance Training, Functional Mobility Training, Transfer Training, Gait Training, Stair training  Safety Devices  Type of devices: Bed alarm in place, Call light within reach, Nurse notified     Therapy Time   Individual Concurrent Group Co-treatment   Time In [phone]         Time Out 1730         Minutes 11         Timed Code Treatment Minutes: 3890 Maciej Fishman, PT

## 2018-08-25 LAB
ABO/RH: NORMAL
ANTIBODY SCREEN: NORMAL
BLOOD BANK DISPENSE STATUS: NORMAL
BLOOD BANK PRODUCT CODE: NORMAL
BPU ID: NORMAL
DESCRIPTION BLOOD BANK: NORMAL
FERRITIN: 1127 NG/ML (ref 15–150)
FOLATE: >20 NG/ML (ref 4.78–24.2)
GLUCOSE BLD-MCNC: 118 MG/DL (ref 70–99)
GLUCOSE BLD-MCNC: 128 MG/DL (ref 70–99)
GLUCOSE BLD-MCNC: 174 MG/DL (ref 70–99)
GLUCOSE BLD-MCNC: 198 MG/DL (ref 70–99)
HCT VFR BLD CALC: 21.1 % (ref 36–48)
HEMOGLOBIN: 7 G/DL (ref 12–16)
INR BLD: 1.56 (ref 0.86–1.14)
IRON SATURATION: 17 % (ref 15–50)
IRON: 27 UG/DL (ref 37–145)
MCH RBC QN AUTO: 31.3 PG (ref 26–34)
MCHC RBC AUTO-ENTMCNC: 33.4 G/DL (ref 31–36)
MCV RBC AUTO: 93.7 FL (ref 80–100)
PDW BLD-RTO: 13.9 % (ref 12.4–15.4)
PERFORMED ON: ABNORMAL
PLATELET # BLD: 119 K/UL (ref 135–450)
PMV BLD AUTO: 9.7 FL (ref 5–10.5)
PROTHROMBIN TIME: 17.8 SEC (ref 9.8–13)
RBC # BLD: 2.25 M/UL (ref 4–5.2)
TOTAL IRON BINDING CAPACITY: 160 UG/DL (ref 260–445)
VANCOMYCIN RANDOM: 12 UG/ML
VITAMIN B-12: 1366 PG/ML (ref 211–911)
WBC # BLD: 5.3 K/UL (ref 4–11)

## 2018-08-25 PROCEDURE — 86901 BLOOD TYPING SEROLOGIC RH(D): CPT

## 2018-08-25 PROCEDURE — 80202 ASSAY OF VANCOMYCIN: CPT

## 2018-08-25 PROCEDURE — 86850 RBC ANTIBODY SCREEN: CPT

## 2018-08-25 PROCEDURE — 86900 BLOOD TYPING SEROLOGIC ABO: CPT

## 2018-08-25 PROCEDURE — 6370000000 HC RX 637 (ALT 250 FOR IP): Performed by: NURSE PRACTITIONER

## 2018-08-25 PROCEDURE — 36415 COLL VENOUS BLD VENIPUNCTURE: CPT

## 2018-08-25 PROCEDURE — 36430 TRANSFUSION BLD/BLD COMPNT: CPT

## 2018-08-25 PROCEDURE — 82746 ASSAY OF FOLIC ACID SERUM: CPT

## 2018-08-25 PROCEDURE — 82728 ASSAY OF FERRITIN: CPT

## 2018-08-25 PROCEDURE — 85027 COMPLETE CBC AUTOMATED: CPT

## 2018-08-25 PROCEDURE — 1200000000 HC SEMI PRIVATE

## 2018-08-25 PROCEDURE — 2580000003 HC RX 258: Performed by: NURSE PRACTITIONER

## 2018-08-25 PROCEDURE — 2580000003 HC RX 258: Performed by: INTERNAL MEDICINE

## 2018-08-25 PROCEDURE — 82607 VITAMIN B-12: CPT

## 2018-08-25 PROCEDURE — 83540 ASSAY OF IRON: CPT

## 2018-08-25 PROCEDURE — 83550 IRON BINDING TEST: CPT

## 2018-08-25 PROCEDURE — 85610 PROTHROMBIN TIME: CPT

## 2018-08-25 PROCEDURE — 6370000000 HC RX 637 (ALT 250 FOR IP): Performed by: INTERNAL MEDICINE

## 2018-08-25 PROCEDURE — 86923 COMPATIBILITY TEST ELECTRIC: CPT

## 2018-08-25 PROCEDURE — P9016 RBC LEUKOCYTES REDUCED: HCPCS

## 2018-08-25 RX ORDER — WARFARIN SODIUM 2 MG/1
2 TABLET ORAL
Status: COMPLETED | OUTPATIENT
Start: 2018-08-25 | End: 2018-08-25

## 2018-08-25 RX ORDER — 0.9 % SODIUM CHLORIDE 0.9 %
250 INTRAVENOUS SOLUTION INTRAVENOUS ONCE
Status: COMPLETED | OUTPATIENT
Start: 2018-08-25 | End: 2018-08-25

## 2018-08-25 RX ADMIN — CINACALCET HYDROCHLORIDE 30 MG: 30 TABLET, COATED ORAL at 09:46

## 2018-08-25 RX ADMIN — Medication 10 ML: at 21:56

## 2018-08-25 RX ADMIN — NEPHROCAP 1 MG: 1 CAP ORAL at 09:46

## 2018-08-25 RX ADMIN — SEVELAMER CARBONATE 800 MG: 800 TABLET, FILM COATED ORAL at 17:47

## 2018-08-25 RX ADMIN — INSULIN LISPRO 2 UNITS: 100 INJECTION, SOLUTION INTRAVENOUS; SUBCUTANEOUS at 13:37

## 2018-08-25 RX ADMIN — CETIRIZINE HYDROCHLORIDE 5 MG: 10 TABLET, FILM COATED ORAL at 09:46

## 2018-08-25 RX ADMIN — ATORVASTATIN CALCIUM 40 MG: 40 TABLET, FILM COATED ORAL at 21:56

## 2018-08-25 RX ADMIN — ASPIRIN 81 MG: 81 TABLET ORAL at 09:46

## 2018-08-25 RX ADMIN — SEVELAMER CARBONATE 800 MG: 800 TABLET, FILM COATED ORAL at 09:46

## 2018-08-25 RX ADMIN — PANTOPRAZOLE SODIUM 20 MG: 20 TABLET, DELAYED RELEASE ORAL at 09:46

## 2018-08-25 RX ADMIN — WARFARIN SODIUM 2 MG: 2 TABLET ORAL at 17:47

## 2018-08-25 RX ADMIN — CLOPIDOGREL BISULFATE 75 MG: 75 TABLET, FILM COATED ORAL at 09:46

## 2018-08-25 RX ADMIN — METOPROLOL SUCCINATE 25 MG: 25 TABLET, EXTENDED RELEASE ORAL at 09:46

## 2018-08-25 RX ADMIN — SODIUM CHLORIDE 250 ML: 9 INJECTION, SOLUTION INTRAVENOUS at 13:22

## 2018-08-25 RX ADMIN — INSULIN LISPRO 1 UNITS: 100 INJECTION, SOLUTION INTRAVENOUS; SUBCUTANEOUS at 21:56

## 2018-08-25 RX ADMIN — INSULIN LISPRO 2 UNITS: 100 INJECTION, SOLUTION INTRAVENOUS; SUBCUTANEOUS at 09:31

## 2018-08-25 RX ADMIN — INSULIN LISPRO 1 UNITS: 100 INJECTION, SOLUTION INTRAVENOUS; SUBCUTANEOUS at 13:37

## 2018-08-25 RX ADMIN — Medication 10 ML: at 09:46

## 2018-08-25 ASSESSMENT — PAIN SCALES - GENERAL
PAINLEVEL_OUTOF10: 0
PAINLEVEL_OUTOF10: 0

## 2018-08-25 NOTE — PROGRESS NOTES
Transfer to [facility] from [place] [address] and B3 RN performed complete skin assessment on transfer. Assessment revealed reddened buttocks, blanchable      Upon transferring the patient to the ordered level of care, patients medications were gathered from the following locations and given to the receiving nurse during bedside report. Lock Box: YES  Pyxis Bin: YES  Pyxis Refrigerator: YES  Tube System: YES    The following paperwork was transferred with the patient:    12 hour chart check: YES  Blue Medication Book: YES    Patient belongings transferred with patient include: (clothing, cell phone, dentures, personal electronics, other). Tele monitor assigned to patient, in place for transfer. CMU notified of transfer. LDAs reviewed and documented. Continuous Pulse ox in place (if applicable): NO    MD notified via Perfect Serve. Family notified of transfer, spoke with:  Son at bedside

## 2018-08-25 NOTE — PROGRESS NOTES
Received bedside report from ICU RN. Pt transferred from ICU to C4. Pt RA. Pts skin was assessed. Turned and repositioned. PIV in place and Capped. Family at bedside. Will continue to monitor.

## 2018-08-25 NOTE — PROGRESS NOTES
Intravenous 2 times per day    insulin lispro  0-6 Units Subcutaneous TID     insulin lispro  0-3 Units Subcutaneous Nightly    nitroglycerin  0.5 inch Topical 4 times per day     PRN Meds: oxyCODONE-acetaminophen, acetaminophen, magnesium hydroxide, ondansetron, sodium chloride flush, glucose, dextrose, glucagon (rDNA), dextrose      Intake/Output Summary (Last 24 hours) at 08/25/18 0800  Last data filed at 08/25/18 0529   Gross per 24 hour   Intake              760 ml   Output              986 ml   Net             -226 ml       Physical Exam Performed:    /60   Pulse 74   Temp 97.6 °F (36.4 °C) (Oral)   Resp 16   Ht 5' 3\" (1.6 m)   Wt 159 lb 8 oz (72.3 kg)   SpO2 94%   BMI 28.25 kg/m²     General appearance: No apparent distress, appears stated age and cooperative. HEENT: Pupils equal, round, and reactive to light. Conjunctivae/corneas clear. Neck: Supple, with full range of motion. No jugular venous distention. Trachea midline. Respiratory:  Normal respiratory effort. Clear to auscultation, bilaterally without Rales/Wheezes/Rhonchi. Cardiovascular: Regular rate and rhythm with normal S1/S2 without rubs or gallops. LUSB 3/6 systolic murmur, which seems distinct from Lt forearm fistula, +thrill/bruit. Abdomen: Soft, non-tender, non-distended with normal bowel sounds. Musculoskeletal: No clubbing, cyanosis or edema bilaterally. Full range of motion without deformity. Skin: Skin color, texture, turgor normal.  No rashes or lesions. Neurologic:  Neurovascularly intact without any focal sensory/motor deficits.  Cranial nerves: II-XII intact, grossly non-focal.  Psychiatric: Alert and oriented, thought content appropriate, normal insight  Capillary Refill: Brisk,< 3 seconds   Peripheral Pulses: +2 palpable, equal bilaterally       Labs:   Recent Labs      08/23/18   0500  08/24/18   0430  08/25/18   0458   WBC  4.5  4.8  5.3   HGB  7.8*  7.3*  7.0*   HCT  24.0*  22.5*  21.1*   PLT  172  142 119*     Recent Labs      08/23/18   0500  08/24/18   0430   NA  132*  131*   K  4.6  4.4   CL  96*  96*   CO2  22  22   BUN  17  28*   CREATININE  3.5*  5.0*   CALCIUM  9.3  8.8     No results for input(s): AST, ALT, BILIDIR, BILITOT, ALKPHOS in the last 72 hours. Recent Labs      08/23/18   0500  08/24/18   0430  08/25/18   0458   INR  1.07  1.27*  1.56*     No results for input(s): Radha Pines in the last 72 hours. Urinalysis:      Lab Results   Component Value Date    NITRU Negative 04/12/2015    WBCUA 3-5 04/12/2015    BACTERIA Rare 04/12/2015    RBCUA 20-50 04/12/2015    BLOODU LARGE 04/12/2015    SPECGRAV 1.015 04/12/2015    GLUCOSEU Negative 04/12/2015       Radiology:  XR CHEST PORTABLE   Final Result   Mild congestive failure. XR CHEST STANDARD (2 VW)   Final Result   Mild pulmonary edema. Small left lung base opacity is nonspecific but likely represents atelectasis   and pleural fluid. Pneumonia is felt to be less likely. Assessment/Plan:    Active Hospital Problems    Diagnosis Date Noted    Paroxysmal atrial fibrillation (HCC) [I48.0]     NSTEMI (non-ST elevated myocardial infarction) ([facility] [address] [I21.4] 08/13/2018    [facility] [E11.9]     Stage 5 chronic kidney disease on chronic dialysis ([facility] [N18.6, Z99.2]        [de-identified] y.o. female who presented to [facility] with S medical history of: ESRD on hemodialysis, CAD, essential hypertension, diabetes mellitus. Patient was here from 8/13-8/17/18 with same chest pain symptoms. Patient is in tonight for tingling and \"aching pain\", 5/10 to bilateral shoulders. She is also having burning across her upper chest.  Patient denies actual chest pain, shortness of breath, nausea, dizziness, diaphoresis, palpitations. She denies cough, diarrhea, fevers. She was found in ER of having a troponin of 4. ER spoke with cardiology, start a heparin drip and they will see her in the morning.   She had a cardiac cath on

## 2018-08-25 NOTE — PROGRESS NOTES
Oral Daily Kajal Gallegos MD   Stopped at 08/24/18 0859    atorvastatin (LIPITOR) tablet 40 mg  40 mg Oral Daily Luis Alfredo Pinon MD   40 mg at 08/24/18 2122    acetaminophen (TYLENOL) tablet 650 mg  650 mg Oral Q4H PRN Luis Alfredo Pinon MD   650 mg at 08/23/18 0730    magnesium hydroxide (MILK OF MAGNESIA) 400 MG/5ML suspension 30 mL  30 mL Oral Daily PRN Luis Alfredo Pinon MD        ondansetron [facility]) injection 4 mg  4 mg Intravenous Q6H PRN Luis Alfredo Pinon MD        b complex-C-folic acid (NEPHROCAPS) capsule 1 mg  1 capsule Oral Daily Moody Sneed, SO - CNP   Stopped at 08/24/18 0859    cinacalcet (SENSIPAR) tablet 30 mg  30 mg Oral Daily Moody Sneed, APRN - CNP   Stopped at 08/24/18 0859    pantoprazole (PROTONIX) tablet 20 mg  20 mg Oral Daily Moody Sneed, APRN - CNP   Stopped at 08/24/18 0859    sevelamer (RENVELA) tablet 800 mg  800 mg Oral BID  Moody Sneed APRN - CNP   800 mg at 08/24/18 1823    clopidogrel (PLAVIX) tablet 75 mg  75 mg Oral Daily Moody Sneed, APRN - CNP   Stopped at 08/24/18 8770    sodium chloride flush 0.9 % injection 10 mL  10 mL Intravenous 2 times per day Moody Sneed APRN - CNP   10 mL at 08/24/18 2123    sodium chloride flush 0.9 % injection 10 mL  10 mL Intravenous PRN Moody Sneed APRN - CNP        glucose (GLUTOSE) 40 % oral gel 15 g  15 g Oral PRN Moody Sneed, APRN - CNP        dextrose 50 % solution 12.5 g  12.5 g Intravenous PRN Moody Sneed, APRN - CNP        glucagon (rDNA) injection 1 mg  1 mg Intramuscular PRN Moody Sneed, APRN - CNP        dextrose 5 % solution  100 mL/hr Intravenous PRN Moody Sneed, APRN - CNP        insulin lispro (HUMALOG) injection vial 0-6 Units  0-6 Units Subcutaneous TID  SO Londono - CNP   2 Units at 08/24/18 1739    insulin lispro (HUMALOG) injection vial 0-3 Units  0-3 Units Subcutaneous Nightly SO Huitron CNP   1 Units at 08/24/18 2122    nitroglycerin (NITRO-BID) 2 % ointment 0.5 inch  0.5 inch Topical 4 times per day SO Huitron CNP   Stopped at 08/23/18 1101       LAB DATA:    CBC:   Lab Results   Component Value Date    WBC 5.3 08/25/2018    RBC 2.25 08/25/2018    HGB 7.0 08/25/2018    HCT 21.1 08/25/2018    MCV 93.7 08/25/2018    MCH 31.3 08/25/2018    MCHC 33.4 08/25/2018    RDW 13.9 08/25/2018     08/25/2018    MPV 9.7 08/25/2018     BMP:    Lab Results   Component Value Date     08/24/2018    K 4.4 08/24/2018    CL 96 08/24/2018    CO2 22 08/24/2018    BUN 28 08/24/2018    CREATININE 5.0 08/24/2018    CALCIUM 8.8 08/24/2018    GFRAA 10 08/24/2018    LABGLOM 8 08/24/2018    GLUCOSE 132 08/24/2018     Ionized Calcium:  No results found for: IONCA  Magnesium:    Lab Results   Component Value Date    MG 2.30 08/13/2018     Phosphorus:  No results found for: PHOS  U/A:    Lab Results   Component Value Date    COLORU Yellow 04/12/2015    PHUR 6.0 04/12/2015    WBCUA 3-5 04/12/2015    RBCUA 20-50 04/12/2015    BACTERIA Rare 04/12/2015    CLARITYU Clear 04/12/2015    SPECGRAV 1.015 04/12/2015    LEUKOCYTESUR TRACE 04/12/2015    UROBILINOGEN 0.2 04/12/2015    BILIRUBINUR Negative 04/12/2015    BLOODU LARGE 04/12/2015    GLUCOSEU Negative 04/12/2015     Urine Culture:  No components found for: CURINE  Blood Culture:  No components found for: CBLOOD, CFUNGUSBL  Blood Culture from Central Line:  No components found for: CBLOODLN    Current Facility-Administered Medications   Medication Dose Route Frequency Provider Last Rate Last Dose    0.9 % sodium chloride infusion 250 mL  250 mL Intravenous Once Pepe Cabrera MD        insulin lispro (HUMALOG) injection vial 2 Units  2 Units Subcutaneous TID  Pepe Cabrera MD        metoprolol succinate (TOPROL XL) extended release tablet 25 mg  25 mg Oral Daily Pepe Cabrera MD   25 mg chloride flush 0.9 % injection 10 mL  10 mL Intravenous PRN SO Toney - CNP        glucose (GLUTOSE) 40 % oral gel 15 g  15 g Oral PRN Te Moffett, SO - CNP        dextrose 50 % solution 12.5 g  12.5 g Intravenous PRN Te Moffett, APRN - CNP        glucagon (rDNA) injection 1 mg  1 mg Intramuscular PRN Te Moffett, SO - CNP        dextrose 5 % solution  100 mL/hr Intravenous PRN SO Toney - CNP        insulin lispro (HUMALOG) injection vial 0-6 Units  0-6 Units Subcutaneous TID WC Te Moffett APRN - CNP   2 Units at 08/24/18 1739    insulin lispro (HUMALOG) injection vial 0-3 Units  0-3 Units Subcutaneous Nightly Te Moffett APRN - CNP   1 Units at 08/24/18 2122    nitroglycerin (NITRO-BID) 2 % ointment 0.5 inch  0.5 inch Topical 4 times per day SO Toney CNP   Stopped at 08/23/18 1101       IMPRESSION/RECOMMENDATIONS:      - ESRD: on HD for last 4 years. MWF. No overt sign of volume overload. Got HD yesterday. No need of HD today     -HTN: fairly well controlled. Continue losartan + hydralazine + amlodipine. Decreased hydralazine dose to 25 mg BID since BP was running low normal.     - Anemia:  Started aranesp 60 mcg q weekly     - Hyperphosphetemia: continue sevelamer     - SHPTH (secondary hyperparathyroidism): continue cinacalcet 30 mg daily.      - NSTEMI: as per card. S/p PCI to circumflex + LAD. As per card     - Fever: started on broad spectrum ABx. Blood culture growing staphylococcus coagulase negative. As per primary. Afebrile.  On vanc    - CAD     - Afib: HR controlled    - DMII: fairly well controlled

## 2018-08-26 LAB
ANION GAP SERPL CALCULATED.3IONS-SCNC: 13 MMOL/L (ref 3–16)
BUN BLDV-MCNC: 26 MG/DL (ref 7–20)
CALCIUM SERPL-MCNC: 9 MG/DL (ref 8.3–10.6)
CHLORIDE BLD-SCNC: 98 MMOL/L (ref 99–110)
CO2: 25 MMOL/L (ref 21–32)
CREAT SERPL-MCNC: 5.1 MG/DL (ref 0.6–1.2)
EKG ATRIAL RATE: 76 BPM
EKG DIAGNOSIS: NORMAL
EKG Q-T INTERVAL: 404 MS
EKG QRS DURATION: 104 MS
EKG QTC CALCULATION (BAZETT): 429 MS
EKG R AXIS: -19 DEGREES
EKG T AXIS: 269 DEGREES
EKG VENTRICULAR RATE: 68 BPM
GFR AFRICAN AMERICAN: 10
GFR NON-AFRICAN AMERICAN: 8
GLUCOSE BLD-MCNC: 118 MG/DL (ref 70–99)
GLUCOSE BLD-MCNC: 122 MG/DL (ref 70–99)
GLUCOSE BLD-MCNC: 142 MG/DL (ref 70–99)
GLUCOSE BLD-MCNC: 183 MG/DL (ref 70–99)
GLUCOSE BLD-MCNC: 99 MG/DL (ref 70–99)
HCT VFR BLD CALC: 25.4 % (ref 36–48)
HEMOGLOBIN: 8.5 G/DL (ref 12–16)
INR BLD: 1.91 (ref 0.86–1.14)
MCH RBC QN AUTO: 31.9 PG (ref 26–34)
MCHC RBC AUTO-ENTMCNC: 33.4 G/DL (ref 31–36)
MCV RBC AUTO: 95.3 FL (ref 80–100)
PDW BLD-RTO: 14.4 % (ref 12.4–15.4)
PERFORMED ON: ABNORMAL
PERFORMED ON: NORMAL
PLATELET # BLD: 134 K/UL (ref 135–450)
PMV BLD AUTO: 9.8 FL (ref 5–10.5)
POTASSIUM SERPL-SCNC: 3.8 MMOL/L (ref 3.5–5.1)
PROTHROMBIN TIME: 21.8 SEC (ref 9.8–13)
RBC # BLD: 2.66 M/UL (ref 4–5.2)
SODIUM BLD-SCNC: 136 MMOL/L (ref 136–145)
WBC # BLD: 6.5 K/UL (ref 4–11)

## 2018-08-26 PROCEDURE — 97530 THERAPEUTIC ACTIVITIES: CPT

## 2018-08-26 PROCEDURE — 80048 BASIC METABOLIC PNL TOTAL CA: CPT

## 2018-08-26 PROCEDURE — 6370000000 HC RX 637 (ALT 250 FOR IP): Performed by: INTERNAL MEDICINE

## 2018-08-26 PROCEDURE — G8988 SELF CARE GOAL STATUS: HCPCS

## 2018-08-26 PROCEDURE — 36415 COLL VENOUS BLD VENIPUNCTURE: CPT

## 2018-08-26 PROCEDURE — 97110 THERAPEUTIC EXERCISES: CPT

## 2018-08-26 PROCEDURE — 85027 COMPLETE CBC AUTOMATED: CPT

## 2018-08-26 PROCEDURE — 2000000000 HC ICU R&B

## 2018-08-26 PROCEDURE — 93005 ELECTROCARDIOGRAM TRACING: CPT | Performed by: INTERNAL MEDICINE

## 2018-08-26 PROCEDURE — 93010 ELECTROCARDIOGRAM REPORT: CPT | Performed by: INTERNAL MEDICINE

## 2018-08-26 PROCEDURE — 85610 PROTHROMBIN TIME: CPT

## 2018-08-26 PROCEDURE — 99291 CRITICAL CARE FIRST HOUR: CPT | Performed by: INTERNAL MEDICINE

## 2018-08-26 PROCEDURE — G8987 SELF CARE CURRENT STATUS: HCPCS

## 2018-08-26 PROCEDURE — 6370000000 HC RX 637 (ALT 250 FOR IP): Performed by: NURSE PRACTITIONER

## 2018-08-26 PROCEDURE — 2580000003 HC RX 258: Performed by: NURSE PRACTITIONER

## 2018-08-26 PROCEDURE — 99223 1ST HOSP IP/OBS HIGH 75: CPT | Performed by: INTERNAL MEDICINE

## 2018-08-26 RX ORDER — DOCUSATE SODIUM 100 MG/1
100 CAPSULE, LIQUID FILLED ORAL DAILY
Status: DISCONTINUED | OUTPATIENT
Start: 2018-08-26 | End: 2018-08-29 | Stop reason: HOSPADM

## 2018-08-26 RX ORDER — UREA 10 %
1 LOTION (ML) TOPICAL NIGHTLY
Status: DISCONTINUED | OUTPATIENT
Start: 2018-08-26 | End: 2018-08-29 | Stop reason: HOSPADM

## 2018-08-26 RX ORDER — WARFARIN SODIUM 2 MG/1
2 TABLET ORAL
Status: DISCONTINUED | OUTPATIENT
Start: 2018-08-26 | End: 2018-08-26

## 2018-08-26 RX ADMIN — SEVELAMER CARBONATE 800 MG: 800 TABLET, FILM COATED ORAL at 07:46

## 2018-08-26 RX ADMIN — ATORVASTATIN CALCIUM 40 MG: 40 TABLET, FILM COATED ORAL at 21:15

## 2018-08-26 RX ADMIN — INSULIN LISPRO 2 UNITS: 100 INJECTION, SOLUTION INTRAVENOUS; SUBCUTANEOUS at 11:48

## 2018-08-26 RX ADMIN — PANTOPRAZOLE SODIUM 20 MG: 20 TABLET, DELAYED RELEASE ORAL at 07:46

## 2018-08-26 RX ADMIN — Medication 1 MG: at 21:15

## 2018-08-26 RX ADMIN — INSULIN LISPRO 2 UNITS: 100 INJECTION, SOLUTION INTRAVENOUS; SUBCUTANEOUS at 17:18

## 2018-08-26 RX ADMIN — INSULIN LISPRO 1 UNITS: 100 INJECTION, SOLUTION INTRAVENOUS; SUBCUTANEOUS at 17:18

## 2018-08-26 RX ADMIN — NEPHROCAP 1 MG: 1 CAP ORAL at 07:46

## 2018-08-26 RX ADMIN — SEVELAMER CARBONATE 800 MG: 800 TABLET, FILM COATED ORAL at 17:17

## 2018-08-26 RX ADMIN — ASPIRIN 81 MG: 81 TABLET ORAL at 07:45

## 2018-08-26 RX ADMIN — Medication 10 ML: at 21:15

## 2018-08-26 RX ADMIN — DOCUSATE SODIUM 100 MG: 100 CAPSULE, LIQUID FILLED ORAL at 15:24

## 2018-08-26 RX ADMIN — CINACALCET HYDROCHLORIDE 30 MG: 30 TABLET, COATED ORAL at 07:45

## 2018-08-26 RX ADMIN — CLOPIDOGREL BISULFATE 75 MG: 75 TABLET, FILM COATED ORAL at 07:46

## 2018-08-26 RX ADMIN — INSULIN LISPRO 1 UNITS: 100 INJECTION, SOLUTION INTRAVENOUS; SUBCUTANEOUS at 11:47

## 2018-08-26 RX ADMIN — CETIRIZINE HYDROCHLORIDE 5 MG: 10 TABLET, FILM COATED ORAL at 07:46

## 2018-08-26 RX ADMIN — INSULIN LISPRO 2 UNITS: 100 INJECTION, SOLUTION INTRAVENOUS; SUBCUTANEOUS at 07:47

## 2018-08-26 ASSESSMENT — PAIN SCALES - GENERAL
PAINLEVEL_OUTOF10: 0

## 2018-08-26 NOTE — PROGRESS NOTES
9762    magnesium hydroxide (MILK OF MAGNESIA) 400 MG/5ML suspension 30 mL  30 mL Oral Daily PRN Yves Ac MD        ond[facility]etron [facility]) injection 4 mg  4 mg Intravenous Q6H PRN Yves Ac MD        b complex-C-folic acid (NEPHROCAPS) capsule 1 mg  1 capsule Oral Daily [facility] Ceballos, APRN - CNP   1 mg at 08/26/18 0746    cinacalcet (SENSIPAR) tablet 30 mg  30 mg Oral Daily [facility], APRN - CNP   30 mg at 08/26/18 0745    pantoprazole (PROTONIX) tablet 20 mg  20 mg Oral Daily [facility], APRN - CNP   20 mg at 08/26/18 0746    sevelamer (RENVELA) tablet 800 mg  800 mg Oral BID  Cheyannel Ceballos, APRN - CNP   800 mg at 08/26/18 0746    clopidogrel (PLAVIX) tablet 75 mg  75 mg Oral Daily [facility], APRN - CNP   75 mg at 08/26/18 0746    sodium chloride flush 0.9 % injection 10 mL  10 mL Intravenous 2 times per day [facility], APRN - CNP   10 mL at 08/25/18 2156    sodium chloride flush 0.9 % injection 10 mL  10 mL Intravenous PRN [facility], APRN - CNP        glucose (GLUTOSE) 40 % oral gel 15 g  15 g Oral PRN [facility], APRN - CNP        dextrose 50 % solution 12.5 g  12.5 g Intravenous PRN [facility], APRN - CNP        glucagon (rDNA) injection 1 mg  1 mg Intramuscular PRN [facility], APRN - CNP        dextrose 5 % solution  100 mL/hr Intravenous PRN [facility], APRN - CNP        insulin lispro (HUMALOG) injection vial 0-6 Units  0-6 Units Subcutaneous TID  Cheyannel Ceballos, APRN - CNP   1 Units at 08/25/18 1337    insulin lispro (HUMALOG) injection vial 0-3 Units  0-3 Units Subcutaneous Nightly [facility], APRN - CNP   1 Units at 08/25/18 2156    nitroglycerin (NITRO-BID) 2 % ointment 0.5 inch  0.5 inch Topical 4 times per day [facility], APRN - CNP   Stopped at 08/23/18 1101       LAB DATA:    CBC:   Lab Results   Component Value

## 2018-08-26 NOTE — PROGRESS NOTES
Pharmacy to Dose Warfarin     Dx: AFib  [id number] Score for Atrial Fibrillation Stroke Risk 6  Goal INR range 2-3  Home Warfarin dose: 2mg daily     Date                 INR                 Warfarin  8/22                 1.08                 2 mg       8/23                 1.07                 2 mg  8/24                 1.27                 2 mg   8/25  1.56  2mg  8/26  1.91  2mg    Recommend Warfarin 2mg tonight x1. Daily INR ordered. Rx will continue to manage therapy per consult order. Miranda Kilgore PharmD, BCPS   8/26/2018 7:46 AM

## 2018-08-26 NOTE — PROGRESS NOTES
Consult called to [facility] cardiology on 8/26/2018 @ [phone]. Answering service said not to expect a call back today. RN Aware.    Vidhi Esteban

## 2018-08-26 NOTE — PROGRESS NOTES
Paged cardiology regarding increased pauses in patients heart rhythm. Patient having 4-5 second pauses consistently. Dr. Prasad-cardiology returned call and stated that as long as patients BP was stable nothing emergent or no changes need to be made at this time.

## 2018-08-26 NOTE — PROGRESS NOTES
Occupational Therapy  Facility/Department: [facility] B3 - MED SURG  Daily Treatment Note  NAME: Reva Evans  : 1940  MRN: [id number]    Date of Service: 2018    Discharge Recommendations:  [address]        Patient Diagnosis(es): The encounter diagnosis was NSTEMI (non-ST elevated myocardial infarction) ([facility] [address] has a past medical history of A-fib [facility]); CAD (coronary artery disease); Cardiomyopathy ([facility] [address] [facility] Diabetes mellitus ([facility] GERD (gastroesophageal reflux disease); Hypertension; Renal failure (ARF), acute on chronic (HCC); and Thyroid disease. has a past surgical history that includes Cholecystectomy; Thyroid surgery; Dialysis fistula creation; eye surgery; Cardiac catheterization (2018); and Percutaneous Transluminal Coronary Angio (08/15/2018).     Restrictions  Restrictions/Precautions  Restrictions/Precautions: Fall Risk, General Precautions  Position Activity Restriction  Other position/activity restrictions: activity as tolerated, telemetry  Subjective   General  Chart Reviewed: Yes  Patient assessed for rehabilitation services?: Yes  Family / Caregiver Present: No (family left room for pt to participate with OT)  Referring Practitioner: Willa Jimenez MD  Diagnosis: Non-STEMI  Subjective  Subjective: \"I am in A-fib\"  \"they don't know whether I am going to get a pacemaker or not tomorrow\"  General Comment  Comments: RN cleared pt for OT, pt resting in bed, agreeable to therapy  Pain Assessment  Patient Currently in Pain: Denies    Orientation  Orientation  Overall Orientation Status: Within Functional Limits  Objective       Balance  Sitting Balance: Supervision  Standing Balance: Contact guard assistance (with RW)  Standing Balance  Time: 2 minutes  Activity: functional mobility in room/around bed  Sit to stand: Minimal assistance  Stand to sit: Contact guard assistance  Comment: verbal cues for safe hand placement  Bed mobility  Supine to Sit: Minimal assistance  Sit to Supine: Contact guard assistance  Scooting: Modified independent  Transfers  Stand Pivot Transfers: Contact guard assistance  Sit to stand: Minimal assistance  Stand to sit: Contact guard assistance        Cognition  Overall Cognitive Status: Exceptions  Arousal/Alertness: Appropriate responses to stimuli  Following Commands: Follows all commands without difficulty  Attention Span: Appears intact  Memory: Appears intact  Safety Judgement: Good awareness of safety precautions  Insights: Fully aware of deficits  Initiation: Requires cues for some  Sequencing: Does not require cues    Type of ROM/Therapeutic Exercise: AROM BUE  Hand flex/ext: x  12  Reps  Elbow flex/ext:  x   12 Reps  Forearm sup/pron:  x 12   Reps  Shld flex/ext:  x  5  Reps x2      LUE General AROM: WFL except  limited to 100* shoulder flexion  RUE General AROM: WFL except  limited to 100* shoulder flexion        Assessment   Performance deficits / Impairments: Decreased functional mobility ; Decreased endurance;Decreased strength;Decreased ROM; Decreased ADL status; Decreased safe awareness;Decreased high-level IADLs  Patient Education: role of OT, safety   REQUIRES OT FOLLOW UP: Yes  Activity Tolerance  Activity Tolerance: Patient Tolerated treatment well  Safety Devices  Safety Devices in place: Yes  Type of devices: Call light within reach; Left in bed;Nurse notified          Plan   Plan  Times per week: 4-5x/week   Current Treatment Recommendations: Strengthening, Balance Training, Functional Mobility Training, Endurance Training, Patient/Caregiver Education & Training, Equipment Evaluation, Education, & procurement, Self-Care / ADL, Safety Education & Training  G-Code  OT G-codes  Functional Assessment Tool Used: AM-PAC  Score: raw score = 16; G-code = CK  Functional Limitation: Self care  Self Care Current Status ():  At least 40 percent but less than 60 percent impaired, limited or restricted  Self Care Goal Status (): At least 20 percent but less than 40 percent impaired, limited or restricted    AM-PAC Score        AM-PAC Inpatient Daily Activity Raw Score: 16  AM-PAC Inpatient ADL T-Scale Score : 35.96  ADL Inpatient CMS 0-100% Score: 53.32  ADL Inpatient CMS G-Code Modifier : CK    Goals  Short term goals  Time Frame for Short term goals: 1 week  Short term goal 1: Pt will complete functional transfers with SBA  Short term goal 2: Pt will complete LE dressing with Min A by 8/27/18  Short term goal 3: Pt will perform 3-5 minutes dynamic standing activity with SBA  Patient Goals   Patient goals : \"to go home\"       Therapy Time   Individual Concurrent Group Co-treatment   Time In 1628         Time Out 1700         Minutes [address] [place], [place]

## 2018-08-26 NOTE — PROGRESS NOTES
Transfer to ICU from     Upon transferring the patient to the ordered level of care, patients medications were gathered from the following locations and given to the receiving nurse during bedside report. Lock Box: YES  Pyxis Bin: YES  Pyxis Refrigerator: YE  Tube System: YES    The following paperwork was transferred with the patient:    12 hour chart check: N/A  Blue Medication Book: YES    Patient belongings transferred with patient include: (clothing, cell phone, dentures, personal electronics, other). CMU notified of transfer. LDAs reviewed and documented. Continuous Pulse ox in place (if applicable): N/A    MD notified via [address] [place] [address]. Family notified of transfer, spoke with: (spouse, son, daughter, Andre Reed).

## 2018-08-26 NOTE — CONSULTS
of System:  All other systems reviewed except for that noted above. Pertinent negatives and positives are:       · General: negative for fever, chills   · Ophthalmic ROS: negative for - eye pain or loss of vision  · ENT ROS: negative for - headaches, sore throat   · Respiratory: negative for - cough, sputum  · Cardiovascular: Reviewed in HPI  · Gastrointestinal: negative for - abdominal pain, diarrhea, N/V  · Hematology: negative for - bleeding, blood clots, bruising or jaundice  · Genito-Urinary:  negative for - Dysuria or incontinence  · Musculoskeletal: negative for - Joint swelling, muscle pain  · Neurological: negative for - confusion, dizziness, headaches   · Psychiatric: No anxiety, no depression. · Dermatological: negative for - rash    Physical Examination:  Vitals:    18 1115   BP: (!) 121/57   Pulse: 74   Resp: 16   Temp: 98.5 °F (36.9 °C)   SpO2: 94%      In: 871.7 [P.O.:480; Blood:391.7]  Out: 0    Wt Readings from Last 3 Encounters:   18 162 lb 4.1 oz (73.6 kg)   18 155 lb 13.8 oz (70.7 kg)   17 167 lb (75.8 kg)     Temp  Av.6 °F (37 °C)  Min: 98.2 °F (36.8 °C)  Max: 98.9 °F (37.2 °C)  Pulse  Av  Min: 62  Max: 74  BP  Min: 111/59  Max: 124/58  SpO2  Av.8 %  Min: 93 %  Max: 97 %    Intake/Output Summary (Last 24 hours) at 18 1431  Last data filed at 18 1001   Gross per 24 hour   Intake           871.67 ml   Output                0 ml   Net           871.67 ml       · Telemetry: Afib  · Constitutional: Oriented. No distress. · Head: Normocephalic and atraumatic. · Mouth/Throat: Oropharynx is clear and moist.   · Eyes: Conjunctivae normal. EOM are normal.   · Neck: Neck supple. No rigidity. No JVD present. · Cardiovascular: Normal rate, Irregular rhythm, S1&S2. Systolic murmur   · Pulmonary/Chest: Bilateral respiratory sounds. No wheezes, No rhonchi. · Abdominal: Soft. Bowel sounds present. No distension, No tenderness.    · Musculoskeletal: No (COUMADIN) daily dosing (placeholder)   Other RX Placeholder    darbepoetin cleo-polysorbate  60 mcg Subcutaneous Weekly    aspirin  81 mg Oral Daily    cetirizine  5 mg Oral Daily    atorvastatin  40 mg Oral Daily    b complex-C-folic acid  1 capsule Oral Daily    cinacalcet  30 mg Oral Daily    pantoprazole  20 mg Oral Daily    sevelamer  800 mg Oral BID WC    clopidogrel  75 mg Oral Daily    sodium chloride flush  10 mL Intravenous 2 times per day    insulin lispro  0-6 Units Subcutaneous TID WC    insulin lispro  0-3 Units Subcutaneous Nightly    nitroglycerin  0.5 inch Topical 4 times per day     Continuous Infusions:   dextrose       PRN Meds:.acetaminophen, magnesium hydroxide, ondansetron, sodium chloride flush, glucose, dextrose, glucagon (rDNA), dextrose     Patient Active Problem List    Diagnosis Date Noted    Paroxysmal atrial fibrillation (HCC)     NSTEMI (non-ST elevated myocardial infarction) ([facility] 08/13/2018    Coronary artery disease involving native coronary artery of native heart with unstable angina pectoris (HCC)     Diabetes mellitus ([facility]     Essential hypertension     Stage 5 chronic kidney disease on chronic dialysis [facility])       Active Hospital Problems    Diagnosis Date Noted    Paroxysmal atrial fibrillation (HCC) [I48.0]     NSTEMI (non-ST elevated myocardial infarction) ([facility] [I21.4] 08/13/2018    Diabetes mellitus ([facility] [E11.9]     Stage 5 chronic kidney disease on chronic dialysis ([facility] [N18.6, Z99.2]        Assessment:   Sick sinus syndrome / tachy-nanda syndrome   Paroxysmal atrial fibrillation  CAD   DM  CKD    Plan:  Patient has had symptomatic paroxysmal atrial fibrillation with occasional rapid ventricular rate intermittent with long conversion pauses and occasional high grade AV block with bradycardia with heart rates 30s. She also has complex coronary artery disease s/p complex PCI to RCA and LAD.  Her BB has been held due to these episodes of

## 2018-08-26 NOTE — PROGRESS NOTES
dialysis [facility]) [N18.6, Z99.2]        [de-identified] y.o. female who presented to [facility] with S medical history of: ESRD on hemodialysis, CAD, essential hypertension, diabetes mellitus. Patient was here from 8/13-8/17/18 with same chest pain symptoms. Patient is in tonight for tingling and \"aching pain\", 5/10 to bilateral shoulders. She is also having burning across her upper chest.  Patient denies actual chest pain, shortness of breath, nausea, dizziness, diaphoresis, palpitations. She denies cough, diarrhea, fevers. She was found in ER of having a troponin of 4. ER spoke with cardiology, start a heparin drip and they will see her in the morning. She had a cardiac cath on 8/15/18 with Dr. Leeroy Miller. She had Rotablator assisted PCI of proximal, distal RCA, ostial to proximal right PDA with placement of 3 drug-eluting stents. She was scheduled to have more stents placed, but did not have the needed equipment. Cardiothoracic surgery was consulted, she is currently not a candidate. \"      Non-STEMI  Patient had previous Rotablator assisted PCI of proximal, distal RCA, and ostial to proximal right PDA with x3 SHYANNE 8/15/18 with Dr. Leeroy Miller  Clopidogrel, statin, metoprolol, amlodipine. Aspirin until warfarin therapeutic per Dr. Leeroy Miller. [address] [place] [address] 8/21, had rotablator-assisted SHYANNE's to prox-mid Cx and ostial-prox LAD. Fevers and malaise, starting overnight on 8/22, temp up to 102.1, with CoNS in both blood cultures from 8/22  - No localizing symptoms. May be true infection given her ESRD status and preceding intravascular procedure. F/u repeat blood cultures. Vanc and cefepime started empirically 8/23, stopped cefepime 8/24, changed vanc to cefazolin 8/27. ESRD on hemodialysis MWF  Appreciate nephrology consult    HTN  - held amlodipine, losartan, hydralazine due to lower BPs. Then had to hold metoprolol due to intermittent bradycardia and multiple longer pauses on 8/26 (3.75, 4.08, and 5.22 seconds).   She has no significant urine output, stopped furosemide. DM (Diabetes Mellitus) - Type 2  - A1c misleadingly low at 4.7. Adjusted insulin regimen. Afib  - held warfarin on admission, resumed post-PCI. Metoprolol. Acute on chronic normocytic anemia  - likely due to chronic disease, especially chronic renal disease.  - iron studies suggestive of chronic disease, B12/folate were not low  - 1u pRBCs on 8/25 when Hb drifted down. No evidence of blood loss. DVT Prophylaxis: anticoagulated as above. Diet: DIET CARDIAC;  Code Status: Full Code    PT/OT Eval Status: rec'd SNF    Dispo - perhaps 8/27 if no further fevers, if repeat blood cultures remain negative, and if Hb stable. She lives at home, will need to resume [facility].       Justen Sifuentes MD

## 2018-08-27 LAB
ANION GAP SERPL CALCULATED.3IONS-SCNC: 13 MMOL/L (ref 3–16)
BUN BLDV-MCNC: 29 MG/DL (ref 7–20)
CALCIUM SERPL-MCNC: 8.7 MG/DL (ref 8.3–10.6)
CHLORIDE BLD-SCNC: 99 MMOL/L (ref 99–110)
CO2: 25 MMOL/L (ref 21–32)
CREAT SERPL-MCNC: 5.6 MG/DL (ref 0.6–1.2)
GFR AFRICAN AMERICAN: 9
GFR NON-AFRICAN AMERICAN: 7
GLUCOSE BLD-MCNC: 100 MG/DL (ref 70–99)
GLUCOSE BLD-MCNC: 102 MG/DL (ref 70–99)
GLUCOSE BLD-MCNC: 103 MG/DL (ref 70–99)
GLUCOSE BLD-MCNC: 108 MG/DL (ref 70–99)
GLUCOSE BLD-MCNC: 92 MG/DL (ref 70–99)
HCT VFR BLD CALC: 25.2 % (ref 36–48)
HEMOGLOBIN: 8.6 G/DL (ref 12–16)
INR BLD: 2.49 (ref 0.86–1.14)
MCH RBC QN AUTO: 32.4 PG (ref 26–34)
MCHC RBC AUTO-ENTMCNC: 34 G/DL (ref 31–36)
MCV RBC AUTO: 95.3 FL (ref 80–100)
PDW BLD-RTO: 14.2 % (ref 12.4–15.4)
PERFORMED ON: ABNORMAL
PLATELET # BLD: 148 K/UL (ref 135–450)
PMV BLD AUTO: 9.6 FL (ref 5–10.5)
POTASSIUM SERPL-SCNC: 4 MMOL/L (ref 3.5–5.1)
PROTHROMBIN TIME: 28.4 SEC (ref 9.8–13)
RBC # BLD: 2.64 M/UL (ref 4–5.2)
SODIUM BLD-SCNC: 137 MMOL/L (ref 136–145)
WBC # BLD: 5.6 K/UL (ref 4–11)

## 2018-08-27 PROCEDURE — 0JH606Z INSERTION OF PACEMAKER, DUAL CHAMBER INTO CHEST SUBCUTANEOUS TISSUE AND FASCIA, OPEN APPROACH: ICD-10-PCS | Performed by: INTERNAL MEDICINE

## 2018-08-27 PROCEDURE — 80048 BASIC METABOLIC PNL TOTAL CA: CPT

## 2018-08-27 PROCEDURE — 99153 MOD SED SAME PHYS/QHP EA: CPT

## 2018-08-27 PROCEDURE — 85610 PROTHROMBIN TIME: CPT

## 2018-08-27 PROCEDURE — C1785 PMKR, DUAL, RATE-RESP: HCPCS

## 2018-08-27 PROCEDURE — C1898 LEAD, PMKR, OTHER THAN TRANS: HCPCS

## 2018-08-27 PROCEDURE — 33208 INSRT HEART PM ATRIAL & VENT: CPT

## 2018-08-27 PROCEDURE — 2580000003 HC RX 258: Performed by: INTERNAL MEDICINE

## 2018-08-27 PROCEDURE — 99152 MOD SED SAME PHYS/QHP 5/>YRS: CPT | Performed by: INTERNAL MEDICINE

## 2018-08-27 PROCEDURE — 85027 COMPLETE CBC AUTOMATED: CPT

## 2018-08-27 PROCEDURE — 6370000000 HC RX 637 (ALT 250 FOR IP): Performed by: NURSE PRACTITIONER

## 2018-08-27 PROCEDURE — 33208 INSRT HEART PM ATRIAL & VENT: CPT | Performed by: INTERNAL MEDICINE

## 2018-08-27 PROCEDURE — 6360000002 HC RX W HCPCS

## 2018-08-27 PROCEDURE — 02H63JZ INSERTION OF PACEMAKER LEAD INTO RIGHT ATRIUM, PERCUTANEOUS APPROACH: ICD-10-PCS | Performed by: INTERNAL MEDICINE

## 2018-08-27 PROCEDURE — 99152 MOD SED SAME PHYS/QHP 5/>YRS: CPT

## 2018-08-27 PROCEDURE — 99024 POSTOP FOLLOW-UP VISIT: CPT | Performed by: INTERNAL MEDICINE

## 2018-08-27 PROCEDURE — 2500000003 HC RX 250 WO HCPCS

## 2018-08-27 PROCEDURE — 02HK3JZ INSERTION OF PACEMAKER LEAD INTO RIGHT VENTRICLE, PERCUTANEOUS APPROACH: ICD-10-PCS | Performed by: INTERNAL MEDICINE

## 2018-08-27 PROCEDURE — C1894 INTRO/SHEATH, NON-LASER: HCPCS

## 2018-08-27 PROCEDURE — 2580000003 HC RX 258

## 2018-08-27 PROCEDURE — 36415 COLL VENOUS BLD VENIPUNCTURE: CPT

## 2018-08-27 PROCEDURE — 6370000000 HC RX 637 (ALT 250 FOR IP): Performed by: INTERNAL MEDICINE

## 2018-08-27 PROCEDURE — 90937 HEMODIALYSIS REPEATED EVAL: CPT

## 2018-08-27 PROCEDURE — 2580000003 HC RX 258: Performed by: NURSE PRACTITIONER

## 2018-08-27 PROCEDURE — 6360000002 HC RX W HCPCS: Performed by: INTERNAL MEDICINE

## 2018-08-27 PROCEDURE — 2000000000 HC ICU R&B

## 2018-08-27 RX ORDER — ACETAMINOPHEN 325 MG/1
650 TABLET ORAL EVERY 4 HOURS PRN
Status: DISCONTINUED | OUTPATIENT
Start: 2018-08-27 | End: 2018-08-29 | Stop reason: HOSPADM

## 2018-08-27 RX ORDER — SODIUM CHLORIDE 0.9 % (FLUSH) 0.9 %
10 SYRINGE (ML) INJECTION PRN
Status: DISCONTINUED | OUTPATIENT
Start: 2018-08-27 | End: 2018-08-28

## 2018-08-27 RX ORDER — SODIUM CHLORIDE 0.9 % (FLUSH) 0.9 %
10 SYRINGE (ML) INJECTION EVERY 12 HOURS SCHEDULED
Status: DISCONTINUED | OUTPATIENT
Start: 2018-08-27 | End: 2018-08-28

## 2018-08-27 RX ADMIN — ACETAMINOPHEN 650 MG: 325 TABLET, FILM COATED ORAL at 17:02

## 2018-08-27 RX ADMIN — Medication 10 ML: at 09:00

## 2018-08-27 RX ADMIN — SODIUM CHLORIDE, PRESERVATIVE FREE 10 ML: 5 INJECTION INTRAVENOUS at 21:05

## 2018-08-27 RX ADMIN — Medication 1 MG: at 22:55

## 2018-08-27 RX ADMIN — CEFAZOLIN 1 G: 1 INJECTION, POWDER, FOR SOLUTION INTRAMUSCULAR; INTRAVENOUS at 18:08

## 2018-08-27 RX ADMIN — ATORVASTATIN CALCIUM 40 MG: 40 TABLET, FILM COATED ORAL at 21:05

## 2018-08-27 RX ADMIN — ACETAMINOPHEN 650 MG: 325 TABLET, FILM COATED ORAL at 21:05

## 2018-08-27 ASSESSMENT — PAIN DESCRIPTION - PAIN TYPE: TYPE: ACUTE PAIN

## 2018-08-27 ASSESSMENT — PAIN DESCRIPTION - FREQUENCY: FREQUENCY: INTERMITTENT

## 2018-08-27 ASSESSMENT — PAIN SCALES - GENERAL
PAINLEVEL_OUTOF10: 0
PAINLEVEL_OUTOF10: 2
PAINLEVEL_OUTOF10: 3
PAINLEVEL_OUTOF10: 0
PAINLEVEL_OUTOF10: 8

## 2018-08-27 ASSESSMENT — PAIN DESCRIPTION - LOCATION: LOCATION: ABDOMEN;CHEST

## 2018-08-27 ASSESSMENT — PAIN DESCRIPTION - DESCRIPTORS: DESCRIPTORS: CRAMPING;ACHING;SORE

## 2018-08-27 NOTE — PROGRESS NOTES
Progress Note    Date:  2018   Patient: Jyoti Sanchez  Age:  [de-identified] y.o. Admission:  2018  7:34 PM  Admit DX: NSTEMI (non-ST elevated myocardial infarction) ([facility] [I21.4]  NSTEMI (non-ST elevated myocardial infarction) ([facility] [I21.4]   LOS: 8 days     Reason for follow up:symptomatic sinus pauses    SUBJECTIVE:    The patient was seen and examined. Notes reviewed. There were not complications over night. Patient had multiple sinus pauses  The patient is being seen for previous M.I., coronary artery stent, coronary angioplasty and sinus node dysfunction. Patient's cardiac review of systems: positive for fatigue. The patient is generally feeling stable. ROS    OBJECTIVE:    Telemetry: Sinus  BP (!) 127/52   Pulse 54   Temp 98.6 °F (37 °C) (Oral)   Resp 16   Ht 5' 3\" (1.6 m)   Wt 158 lb 15.2 oz (72.1 kg)   SpO2 95%   BMI 28.16 kg/m²   Vital Signs:           Blood pressure (!) 127/52, pulse 54, temperature 98.6 °F (37 °C), temperature source Oral, resp. rate 16, height 5' 3\" (1.6 m), weight 158 lb 15.2 oz (72.1 kg), SpO2 95 %. Temp  Av.4 °F (36.9 °C)  Min: 98.1 °F (36.7 °C)  Max: 98.6 °F (37 °C)  Pulse  Av.4  Min: 45  Max: 74  BP  Min: 106/52  Max: 145/72  SpO2  Av.3 %  Min: 94 %  Max: 97 %      Admission Weight:  Weight: 160 lb (72.6 kg)      I/O:     Intake/Output Summary (Last 24 hours) at 18 3234  Last data filed at 18 1504   Gross per 24 hour   Intake              480 ml   Output                0 ml   Net              480 ml             EXAM:   CONSTITUTIONAL:  awake, alert, cooperative, no apparent distress, and appears stated age. HEENT: Normal jugular venous pulsations, no carotid bruits. Head is atraumatic, normocephalic. Eyes and oral mucosa are normal.  LUNGS:  No increased work of breathing, good air exchange, clear to auscultation bilaterally, no crackles or wheezing.   CARDIOVASCULAR:  Normal apical impulse, regular rate and rhythm, normal S1 and PROFILE:No results for input(s): AST, ALT, ALB, BILIDIR, BILITOT, ALKPHOS in the last 72 hours. 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 Sinus node dysfunction   -dual chamber pacemaker discussed   -Risk, benefit, alternative, rationale of the procedure were discussed with the patient which included but were not limited to allergic reaction to the medications, pain, bleeding, infection, nerve injury, injury to diaphragm(breathing muscle), pulmonary embolus(blood clot in lungs), deep vein blood clot, pneumothorax, hemothorax, acute renal failure, cardiac perforation,  tamponade, need for emergent surgery (open heart), need for any future surgery, pulmonary vein stenosis, left atrial to esophageal fistula, stroke, myocardial infarction, need for permanent pacemaker, lead dislodgement and death.       -Will proceed with pacemaker insertion this afternoon   -keep NPO    2. coronary artery disease    -will resume beta blockers after pacemaker insertion    Please see orders. Discussed with patient and family and nursing. Thank you for asking me to assist in the care of this patient. Please contact me if you have any questions regarding her evaluation. All questions and concerns were addressed to the patient/family. Alternatives to my treatment were discussed. The note was completed using EMR. Every effort was made to ensure accuracy; however, inadvertent computerized transcription errors may be present. NANCY Mattson F.A.C.C.   [facility]  ([address] office

## 2018-08-27 NOTE — PROCEDURES
Date:  8/27/2018       Procedures performed:  · Insertion of MRI compatible right ventricular pacing lead under fluoroscopy. · Insertion of MRI compatible right atrial lead under fluoroscopy  · Insertion of a MRI compatible dual chamber Pacemaker. · Electronic analysis of lead and device. · IV sedation. Sedation detail:  Sedation start time: 1142  Sedation stop time: 1245  ASA class: 2  Mallampati: 2  Pre and post Theo score: 10  Medication given:  Fentanyl: 125 jasper  Versed: 5 mg    Indication of the procedure: Ana Avina is a [de-identified] y.o. old female with    · Symptomatic >5 sec pauses   · lightheadedness and near syncope. ·   Patient was referred for pacemaker implantation. Details of procedure: The patient was brought to the electrophysiology laboratory in stable condition. The patient was in a fasting and non-sedated state. The risks, benefits and alternatives of the procedure were discussed with the patient. The risks including, but not limited to, the risks of vascular injury, bleeding, infection, device malfunction, lead dislodgement, radiation exposure, injury to cardiac and surrounding structures (including pneumothorax), stroke, myocardial infarction and death were discussed in detail. The patient opted to proceed with the device implantation. Written informed consent was signed and placed in the chart. Prophylactic antibiotic was given. The patient was prepped and draped in a sterile fashion. A timeout protocol was completed to identify the patient and the procedure being performed. IV sedation was provided with IV Versed, Fentanyl. An incision was made in the left pectoral area after administration of lidocaine. Using electrocautery and blunt dissection, a pocket was created. Central venous access into the left subclavian vein was obtained using the modified Seldinger technique. After central venous access was obtained, a sheath was placed in the subclavian vein.      A right [facility]. [address].   [place], [address]  Phone: [phone]  Fax: [phone]

## 2018-08-27 NOTE — PROGRESS NOTES
Physical Therapy/Occupational Therapy  Pt transferred to ICU due to bradycardia. Will need new orders when pt medically appropriate for skilled therapy. Thank you.   Mimi Madrid, PT, DPT  Mitzy Joe, OTR/L

## 2018-08-27 NOTE — PROGRESS NOTES
Hospitalist Progress Note      PCP: Monisha Quintana MD    Date of Admission: 8/19/2018    Chief Complaint: Tingling in shoulders and burning and back    Hospital Course: [de-identified] y.o. female who presented to [facility] with S medical history of: ESRD on hemodialysis, CAD, essential hypertension, diabetes mellitus. Patient was here from 8/13-8/17/18 with same chest pain symptoms. Patient is in tonight for tingling and \"aching pain\", 5/10 to bilateral shoulders. She is also having burning across her upper chest.  Patient denies actual chest pain, shortness of breath, nausea, dizziness, diaphoresis, palpitations. She denies cough, diarrhea, fevers. She was found in ER of having a troponin of 4. ER spoke with cardiology, start a heparin drip and they will see her in the morning. She had a cardiac cath on 8/15/18 with Dr. Nury Fung. She had Rotablator assisted PCI of proximal, distal RCA, ostial to proximal right PDA with placement of 3 drug-eluting stents. She was scheduled to have more stents placed, but did not have the needed equipment. Cardiothoracic surgery was consulted, she is currently not a candidate. \"       Subjective:  Transferred back to the ICU last night due to pauses. EP input pending. INR is currently therapeutic, I have not reversed this until EP is sure they want to place a pacer. Pharmacy consult continued for warfarin dosing at this point. We held the dose last night. Blood culture sensitivities still pending. The patient feels fine this AM, still complaining of cough. Perhaps nephrology can remove more fluid with HD now that her BP is stable, recent CXR showed edema.           Medications:  Reviewed    Infusion Medications    dextrose       Scheduled Medications    ceFAZolin  1 g Intravenous QPM    melatonin  1 mg Oral Nightly    docusate sodium  100 mg Oral Daily    insulin lispro  2 Units Subcutaneous TID WC    warfarin (COUMADIN) daily dosing (placeholder)   Other RX Placeholder  darbepoetin cleo-polysorbate  60 mcg Subcutaneous Weekly    aspirin  81 mg Oral Daily    cetirizine  5 mg Oral Daily    atorvastatin  40 mg Oral Daily    b complex-C-folic acid  1 capsule Oral Daily    cinacalcet  30 mg Oral Daily    pantoprazole  20 mg Oral Daily    sevelamer  800 mg Oral BID WC    clopidogrel  75 mg Oral Daily    sodium chloride flush  10 mL Intravenous 2 times per day    insulin lispro  0-6 Units Subcutaneous TID WC    insulin lispro  0-3 Units Subcutaneous Nightly    nitroglycerin  0.5 inch Topical 4 times per day     PRN Meds: acetaminophen, magnesium hydroxide, ondansetron, sodium chloride flush, glucose, dextrose, glucagon (rDNA), dextrose      Intake/Output Summary (Last 24 hours) at 08/27/18 0805  Last data filed at 08/26/18 1504   Gross per 24 hour   Intake              480 ml   Output                0 ml   Net              480 ml       Physical Exam Performed:    BP (!) 127/52   Pulse 54   Temp 98.6 °F (37 °C) (Oral)   Resp 16   Ht 5' 3\" (1.6 m)   Wt 158 lb 15.2 oz (72.1 kg)   SpO2 95%   BMI 28.16 kg/m²     General appearance: No apparent distress, appears stated age and cooperative. HEENT: Pupils equal, round, and reactive to light. Conjunctivae/corneas clear. Neck: Supple, with full range of motion. No jugular venous distention. Trachea midline. Respiratory:  Normal respiratory effort. Clear to auscultation, bilaterally without Wheezes/Rhonchi. Faint bibasilar rales. Cardiovascular: Regular rate and rhythm with normal S1/S2 without rubs or gallops. LUSB 3/6 systolic murmur, which seems distinct from Lt forearm fistula, +thrill/bruit. Abdomen: Soft, non-tender, non-distended with normal bowel sounds. Musculoskeletal: No clubbing, cyanosis or edema bilaterally. Full range of motion without deformity. Skin: Skin color, texture, turgor normal.  No rashes or lesions. Neurologic:  Neurovascularly intact without any focal sensory/motor deficits.  Cranial

## 2018-08-27 NOTE — CARE COORDINATION
SW spoke with Venancio Peralta at [facility] to check on the referral. She is aware patient does Outpatient HD at [facility] Monday, Wednesday and Friday. She will call family about transportation to HD and call the writer back. Referral is still pending.      Wilberto Redmond MSW, LSW

## 2018-08-27 NOTE — PROGRESS NOTES
Progress Note    HISTORY     CC:  Tingling in shoulders       Subjective/   HPI:  Moved back to the ICU overnight. Seems to be feeling ok. No SOB. No recent problems with dialysis access.   She is about 1 kg up from target    ROS:  Constitutional:  No fevers, No Chills, + weakness  Cardiovascular:  No palpations, no edema  Respiratory:  No wheezing, no cough  Skin:  No rash, no itching  :  No hematuria, no dysuria     Social Hx:  Son at bedside     Past Medical and Surgical History:  - Reviewed, no changes     EXAM       Objective/     Vitals:    08/27/18 0600 08/27/18 0700 08/27/18 0800 08/27/18 0900   BP: (!) 127/52 (!) 125/47 (!) 147/48 (!) 148/105   Pulse: 54 58 66 68   Resp: 16 18 21 19   Temp: 98.6 °F (37 °C)  97.8 °F (36.6 °C)    TempSrc: Oral  Oral    SpO2: 95% 96% 98% 97%   Weight:       Height:         24HR INTAKE/OUTPUT:    Intake/Output Summary (Last 24 hours) at 08/27/18 6669  Last data filed at 08/26/18 1504   Gross per 24 hour   Intake              480 ml   Output                0 ml   Net              480 ml     Constitutional:  Alert, awake, no apparent distress  Eyes:  Pupils reactive, sclera clear   Neck:  Normal thyroid, no masses   Cardiovascular:  Irregular, no rub  Respiratory:  No distress, no wheezing  Psychiatry:  Appropriate mood/affect, alert  Abdomen: +bs, soft, nt, no masses   Musculoskeletal: No LE edema, no clubbing   Lymphatics:  No LAD in neck, no supraclavicular nodes  Access:  Left upper arm AVF    MEDICAL DECISION MAKING       Data/  Recent Labs      08/25/18   0458  08/26/18   0635  08/27/18   0418   WBC  5.3  6.5  5.6   HGB  7.0*  8.5*  8.6*   HCT  21.1*  25.4*  25.2*   MCV  93.7  95.3  95.3   PLT  119*  134*  148     Recent Labs      08/26/18   1415  08/27/18   0417   NA  136  137   K  3.8  4.0   CL  98*  99   CO2  25  25   GLUCOSE  118*  92   BUN  26*  29*   CREATININE  5.1*  5.6*   LABGLOM  8*  7*   GFRAA  10*  9*       Assessment/     ESRD: on HD for last 4 years. MWF. No overt sign of volume overload. 1 Kg up     -HTN: fairly well controlled     - Anemia:  Started aranesp 60 mcg q weekly. Below target but stable      - Hyperphosphetemia: continue sevelamer     - SHPTH (secondary hyperparathyroidism): continue cinacalcet 30 mg daily.      - NSTEMI/ CAD: as per card. S/p PCI to circumflex + LAD. Rotablator assisted. As per card     - Fever: started on broad spectrum ABx. Blood culture growing staphylococcus coagulase negative. As per primary. Afebrile. On cefazolin.      - Tachy/nanda syndrome:  EP following      - DMII: fairly well controlled    Plan/     HD today  Follow up labs  Would like to go for 1 kg  Will need to schedule around pacemaker   -----------------------------  Karl Borges M.D.   [facility]

## 2018-08-27 NOTE — PROGRESS NOTES
Patient had multiple 4-5 second pauses and had some lightheadedness associated with a couple of them. Currently rate is in the 70s with a normal blood pressure. Discussed with EP who do not feel that temporary pacer is needed at this time but that permanent pacemaker will be needed for optimal long term management of Afib and CAD. Discussed with patient and family. Good and pertinent questions asked and answered. Plan;  1. Coumadin held. 2.  EP to see tomorrow for final decision and timing of permanent pacemaker. 3.  If severe symptomatic bradycardia tonight, will consider Isuprel infusion and if poor response then temporary pacemaker. Critical care time: 32 min.

## 2018-08-28 ENCOUNTER — APPOINTMENT (OUTPATIENT)
Dept: GENERAL RADIOLOGY | Age: 78
DRG: 242 | End: 2018-08-28
Payer: MEDICARE

## 2018-08-28 PROBLEM — Z95.0 PACEMAKER: Status: ACTIVE | Noted: 2018-08-28

## 2018-08-28 LAB
BLOOD CULTURE, ROUTINE: ABNORMAL
CULTURE, BLOOD 2: ABNORMAL
CULTURE, BLOOD 2: ABNORMAL
GLUCOSE BLD-MCNC: 115 MG/DL (ref 70–99)
GLUCOSE BLD-MCNC: 120 MG/DL (ref 70–99)
GLUCOSE BLD-MCNC: 147 MG/DL (ref 70–99)
GLUCOSE BLD-MCNC: 57 MG/DL (ref 70–99)
GLUCOSE BLD-MCNC: 64 MG/DL (ref 70–99)
GLUCOSE BLD-MCNC: 76 MG/DL (ref 70–99)
INR BLD: 3.25 (ref 0.86–1.14)
ORGANISM: ABNORMAL
PERFORMED ON: ABNORMAL
PERFORMED ON: NORMAL
PROTHROMBIN TIME: 37 SEC (ref 9.8–13)

## 2018-08-28 PROCEDURE — 6360000002 HC RX W HCPCS: Performed by: INTERNAL MEDICINE

## 2018-08-28 PROCEDURE — 2580000003 HC RX 258: Performed by: NURSE PRACTITIONER

## 2018-08-28 PROCEDURE — 71046 X-RAY EXAM CHEST 2 VIEWS: CPT

## 2018-08-28 PROCEDURE — 6370000000 HC RX 637 (ALT 250 FOR IP): Performed by: INTERNAL MEDICINE

## 2018-08-28 PROCEDURE — 85610 PROTHROMBIN TIME: CPT

## 2018-08-28 PROCEDURE — 2000000000 HC ICU R&B

## 2018-08-28 PROCEDURE — 2580000003 HC RX 258: Performed by: INTERNAL MEDICINE

## 2018-08-28 PROCEDURE — 36415 COLL VENOUS BLD VENIPUNCTURE: CPT

## 2018-08-28 PROCEDURE — 6370000000 HC RX 637 (ALT 250 FOR IP): Performed by: NURSE PRACTITIONER

## 2018-08-28 RX ORDER — METOPROLOL SUCCINATE 25 MG/1
25 TABLET, EXTENDED RELEASE ORAL DAILY
Status: DISCONTINUED | OUTPATIENT
Start: 2018-08-28 | End: 2018-08-29 | Stop reason: HOSPADM

## 2018-08-28 RX ADMIN — ACETAMINOPHEN 650 MG: 325 TABLET, FILM COATED ORAL at 01:21

## 2018-08-28 RX ADMIN — ACETAMINOPHEN 650 MG: 325 TABLET, FILM COATED ORAL at 06:37

## 2018-08-28 RX ADMIN — DOCUSATE SODIUM 100 MG: 100 CAPSULE, LIQUID FILLED ORAL at 13:20

## 2018-08-28 RX ADMIN — NEPHROCAP 1 MG: 1 CAP ORAL at 13:20

## 2018-08-28 RX ADMIN — PANTOPRAZOLE SODIUM 20 MG: 20 TABLET, DELAYED RELEASE ORAL at 13:19

## 2018-08-28 RX ADMIN — MUPIROCIN: 20 OINTMENT TOPICAL at 13:25

## 2018-08-28 RX ADMIN — INSULIN LISPRO 2 UNITS: 100 INJECTION, SOLUTION INTRAVENOUS; SUBCUTANEOUS at 13:27

## 2018-08-28 RX ADMIN — ACETAMINOPHEN 650 MG: 325 TABLET, FILM COATED ORAL at 16:45

## 2018-08-28 RX ADMIN — CINACALCET HYDROCHLORIDE 30 MG: 30 TABLET, COATED ORAL at 13:19

## 2018-08-28 RX ADMIN — CEFAZOLIN 1 G: 1 INJECTION, POWDER, FOR SOLUTION INTRAMUSCULAR; INTRAVENOUS at 18:03

## 2018-08-28 RX ADMIN — INSULIN LISPRO 1 UNITS: 100 INJECTION, SOLUTION INTRAVENOUS; SUBCUTANEOUS at 13:25

## 2018-08-28 RX ADMIN — Medication 10 ML: at 13:25

## 2018-08-28 RX ADMIN — SEVELAMER CARBONATE 800 MG: 800 TABLET, FILM COATED ORAL at 18:04

## 2018-08-28 RX ADMIN — MUPIROCIN: 20 OINTMENT TOPICAL at 22:06

## 2018-08-28 RX ADMIN — CETIRIZINE HYDROCHLORIDE 5 MG: 10 TABLET, FILM COATED ORAL at 13:20

## 2018-08-28 RX ADMIN — Medication 1 MG: at 22:06

## 2018-08-28 RX ADMIN — ASPIRIN 81 MG: 81 TABLET ORAL at 13:20

## 2018-08-28 RX ADMIN — CLOPIDOGREL BISULFATE 75 MG: 75 TABLET, FILM COATED ORAL at 13:20

## 2018-08-28 RX ADMIN — METOPROLOL SUCCINATE 25 MG: 25 TABLET, EXTENDED RELEASE ORAL at 13:24

## 2018-08-28 RX ADMIN — ATORVASTATIN CALCIUM 40 MG: 40 TABLET, FILM COATED ORAL at 22:06

## 2018-08-28 RX ADMIN — Medication 10 ML: at 22:07

## 2018-08-28 ASSESSMENT — PAIN SCALES - GENERAL
PAINLEVEL_OUTOF10: 3
PAINLEVEL_OUTOF10: 0
PAINLEVEL_OUTOF10: 1
PAINLEVEL_OUTOF10: 3
PAINLEVEL_OUTOF10: 3

## 2018-08-28 ASSESSMENT — PAIN DESCRIPTION - FREQUENCY: FREQUENCY: INTERMITTENT

## 2018-08-28 ASSESSMENT — PAIN DESCRIPTION - LOCATION: LOCATION: SHOULDER

## 2018-08-28 ASSESSMENT — PAIN DESCRIPTION - DESCRIPTORS: DESCRIPTORS: ACHING;SORE

## 2018-08-28 ASSESSMENT — PAIN DESCRIPTION - ORIENTATION: ORIENTATION: RIGHT

## 2018-08-28 ASSESSMENT — PAIN DESCRIPTION - PAIN TYPE: TYPE: SURGICAL PAIN

## 2018-08-28 NOTE — PROGRESS NOTES
Progress Note    HISTORY     CC:  Tingling in shoulders       Subjective/   HPI:  HD yesterday. Right sided pacemaker placed. Has mild swelling on the left arm which is the dialysis access arm. Room air. Overall doing ok    ROS:  Constitutional:  No fevers, No Chills, + weakness  Cardiovascular:  No palpations, no edema  Respiratory:  No wheezing, no cough  Skin:  No rash, no itching  :  Not making much urine    Social Hx:  Daughter at bedside     Past Medical and Surgical History:  - Reviewed, no changes     EXAM       Objective/     Vitals:    08/28/18 0603 08/28/18 0632 08/28/18 0700 08/28/18 0702   BP: (!) 130/48  (!) 137/44 (!) 137/44   Pulse: 60  60 60   Resp: 20  18 19   Temp:       TempSrc:       SpO2: 96%  95% 95%   Weight:  159 lb 13.3 oz (72.5 kg)     Height:         24HR INTAKE/OUTPUT:      Intake/Output Summary (Last 24 hours) at 08/28/18 1059  Last data filed at 08/28/18 0559   Gross per 24 hour   Intake              840 ml   Output                0 ml   Net              840 ml     Constitutional:  Alert, awake, no apparent distress  Eyes:  Pupils reactive, sclera clear   Neck:  Normal thyroid, no masses   Cardiovascular:  Regular, no rub, right sided cardiac device   Respiratory:  No distress, no wheezing  Psychiatry:  Appropriate mood/affect, alert  Abdomen: +bs, soft, nt, no masses   Musculoskeletal: No LE edema, no clubbing   Lymphatics:  No LAD in neck, no supraclavicular nodes  Access:  Left upper arm AVF with some left hand swelling     MEDICAL DECISION MAKING       Data/  Recent Labs      08/26/18   0635  08/27/18   0418   WBC  6.5  5.6   HGB  8.5*  8.6*   HCT  25.4*  25.2*   MCV  95.3  95.3   PLT  134*  148     Recent Labs      08/26/18   1415  08/27/18   0417   NA  136  137   K  3.8  4.0   CL  98*  99   CO2  25  25   GLUCOSE  118*  92   BUN  26*  29*   CREATININE  5.1*  5.6*   LABGLOM  8*  7*   GFRAA  10*  9*       Assessment/     ESRD: on HD for last 4 years. MWF.   AVF on the left. Mild hand swelling      -HTN: fairly well controlled     - Anemia:  Started aranesp 60 mcg q weekly. Below target but stable      - Hyperphosphetemia: continue sevelamer     - SHPTH (secondary hyperparathyroidism): continue cinacalcet 30 mg daily.      - NSTEMI/ CAD: as per card. S/p PCI to circumflex + LAD. Rotablator assisted. As per cardiology     - Fever: started on broad spectrum ABx. Blood culture growing staphylococcus coagulase negative. As per primary. Afebrile.  On cefazolin, repeat blood cultures were clear.     - Tachy/nanda syndrome:  S/p pacemaker placement on the right side.      - DMII: fairly well controlled    Plan/     HD tomorrow  Might need angiogram as outpatient of the fistula for hand swelling  Follow labs  Discharge planning   -----------------------------  Cynthia Morris M.D.   [facility]

## 2018-08-28 NOTE — CARE COORDINATION
Chart review completed. Patient a [de-identified]year old female, admitted for NSTEMI. Hospital day 9, currently admitted in ICU level of care. Plans are for patient to go to [facility] at discharge for rehab. Per documentation patient family working on arranging transportation to and from HD for patient. [facility] ok to accept when medically stable.   Adelaida Rdz RN

## 2018-08-28 NOTE — PROGRESS NOTES
Pharmacy to Dose Warfarin     Dx: AFib  [id number] Score for Atrial Fibrillation Stroke Risk 6  Goal INR range 2-3  Home Warfarin dose: 2mg daily     Date                 INR                 Warfarin  8/22                 1.08                 2 mg       8/23                 1.07                 2 mg  8/24                 1.27                 2 mg   8/25  1.56  2mg  8/26  1.91  held  8/27  2.49  held  8/28  3.25  held    Recommend to continue to hold Warfarin today. Daily INR ordered. Rx will continue to manage therapy per consult order. Malika Hogan.  Jame MoreiraD, BCPS   8/28/2018 7:44 AM

## 2018-08-28 NOTE — PLAN OF CARE
Problem: Falls - Risk of:  Goal: Will remain free from falls  Will remain free from falls   Outcome: Ongoing  Patient remains free from falls\injury this shift. Bed alarm in place. Pt calls for assistance with transfer.
Problem: Falls - Risk of:  Goal: Will remain free from falls  Will remain free from falls   Outcome: Ongoing  Pt instructed on use of call light, pt verbalized understanding. Bed alarm in place. Bed at lowest position. Non-skid socks on. Side rails up 2/4.
Problem: Falls - Risk of:  Goal: Will remain free from falls  Will remain free from falls   Outcome: Ongoing  Pt is free of falls at this time. Bed is in the lowest position, wheels locked, side rails up x 2, call light, and personal belongings within reach. Patient calls out appropriately. Will continue to monitor.
Problem: Falls - Risk of:  Goal: Will remain free from falls  Will remain free from falls   Outcome: Ongoing  Pt remains free from fall and injury. Non-skid slippers on. Fall risk band on wrist, bed alarm in use. Instructed to call for assistance. Call light in reach, will monitor. Problem: Pain:  Goal: Control of acute pain  Control of acute pain   Outcome: Ongoing  Pt satisfied with current pain medication regimen. Problem: Risk for Impaired Skin Integrity  Goal: Tissue integrity - skin and mucous membranes  Structural intactness and normal physiological function of skin and  mucous membranes. Outcome: Ongoing  Encouraging q2 turns, sacral heart dressing in place. Legs and heels elevated off bed. Will continue to monitor.
Problem: Falls - Risk of:  Goal: Will remain free from falls  Will remain free from falls   Outcome: Ongoing  Remains free from falls.  Bed in low position and call bell within reach
Problem: Musculor/Skeletal Functional Status  Intervention: PT Evaluation/treatment  Increase function to baseline.
Problem: Nutrition  Goal: Optimal nutrition therapy  Outcome: Ongoing  Nutrition Problem: Increased nutrient needs (protein)  Intervention: Food and/or Nutrient Delivery: Continue current diet  Nutritional Goals: PO intakes of 50% or more of protein rich meals  this admission
Problem: Pain:  Goal: Pain level will decrease  Pain level will decrease   Outcome: Ongoing  Patient denies pain at this time. Aware of pain rating scale. Will monitor.
Mallampati:  II (soft palate, uvula, fauces visible)    ASA Classification:  Class 3 - A patient with severe systemic disease that limits activity but is not incapacitating    Theo Scale: Activity:  2 - Able to move 4 extremities voluntarily on command  Respiration:  2 - Able to breathe deeply and cough freely  Circulation:  2 - BP+/- 20mmHg of normal  Consciousness:  2 - Fully awake  Oxygen Saturation (color):  2 - Able to maintain oxygen saturation >92% on room air    Sedation/Anesthesia Plan:  Guard the patient's safety and welfare. Minimize physical discomfort and pain. Minimize negative psychological responses to treatment by providing sedation and analgesia and maximize the potential amnesia. Patient to meet pre-procedure discharge plan.     Medication Planned:  midazolam intravenously, fentanyl intravenously    Patient is an appropriate candidate for plan of sedation: yes      Electronically signed by Andrzej Clements MD on 8/21/2018 at 3:17 PM

## 2018-08-28 NOTE — FLOWSHEET NOTE
Bedside report given by [facility], Drips verified in EPIC and Skin assessment completed. Shift assessment completed and documented; see flowsheets for details. Pt resting in bed, VSS, Lines checked and verified, alarms on and audible. Repositioned patient, sacral Mepilex in place, call light within reach, will continue to closely monitor.

## 2018-08-29 VITALS
HEIGHT: 63 IN | TEMPERATURE: 97 F | DIASTOLIC BLOOD PRESSURE: 56 MMHG | RESPIRATION RATE: 18 BRPM | HEART RATE: 60 BPM | OXYGEN SATURATION: 99 % | WEIGHT: 158.07 LBS | BODY MASS INDEX: 28.01 KG/M2 | SYSTOLIC BLOOD PRESSURE: 152 MMHG

## 2018-08-29 LAB
ANION GAP SERPL CALCULATED.3IONS-SCNC: 15 MMOL/L (ref 3–16)
BLOOD CULTURE, ROUTINE: NORMAL
BUN BLDV-MCNC: 26 MG/DL (ref 7–20)
CALCIUM SERPL-MCNC: 8.6 MG/DL (ref 8.3–10.6)
CHLORIDE BLD-SCNC: 99 MMOL/L (ref 99–110)
CO2: 21 MMOL/L (ref 21–32)
CREAT SERPL-MCNC: 4.9 MG/DL (ref 0.6–1.2)
CULTURE, BLOOD 2: NORMAL
GFR AFRICAN AMERICAN: 10
GFR NON-AFRICAN AMERICAN: 9
GLUCOSE BLD-MCNC: 80 MG/DL (ref 70–99)
GLUCOSE BLD-MCNC: 86 MG/DL (ref 70–99)
GLUCOSE BLD-MCNC: 87 MG/DL (ref 70–99)
HCT VFR BLD CALC: 26.9 % (ref 36–48)
HEMOGLOBIN: 8.9 G/DL (ref 12–16)
INR BLD: 3.07 (ref 0.86–1.14)
MCH RBC QN AUTO: 32 PG (ref 26–34)
MCHC RBC AUTO-ENTMCNC: 32.9 G/DL (ref 31–36)
MCV RBC AUTO: 97.3 FL (ref 80–100)
PDW BLD-RTO: 15 % (ref 12.4–15.4)
PERFORMED ON: NORMAL
PERFORMED ON: NORMAL
PLATELET # BLD: 121 K/UL (ref 135–450)
PMV BLD AUTO: 9.5 FL (ref 5–10.5)
POTASSIUM SERPL-SCNC: 4.2 MMOL/L (ref 3.5–5.1)
PROTHROMBIN TIME: 35 SEC (ref 9.8–13)
RBC # BLD: 2.77 M/UL (ref 4–5.2)
SODIUM BLD-SCNC: 135 MMOL/L (ref 136–145)
WBC # BLD: 6.6 K/UL (ref 4–11)

## 2018-08-29 PROCEDURE — 6370000000 HC RX 637 (ALT 250 FOR IP): Performed by: INTERNAL MEDICINE

## 2018-08-29 PROCEDURE — 5A1D70Z PERFORMANCE OF URINARY FILTRATION, INTERMITTENT, LESS THAN 6 HOURS PER DAY: ICD-10-PCS | Performed by: INTERNAL MEDICINE

## 2018-08-29 PROCEDURE — 2580000003 HC RX 258: Performed by: NURSE PRACTITIONER

## 2018-08-29 PROCEDURE — 85610 PROTHROMBIN TIME: CPT

## 2018-08-29 PROCEDURE — 36415 COLL VENOUS BLD VENIPUNCTURE: CPT

## 2018-08-29 PROCEDURE — 90937 HEMODIALYSIS REPEATED EVAL: CPT

## 2018-08-29 PROCEDURE — 85027 COMPLETE CBC AUTOMATED: CPT

## 2018-08-29 PROCEDURE — 80048 BASIC METABOLIC PNL TOTAL CA: CPT

## 2018-08-29 PROCEDURE — 6370000000 HC RX 637 (ALT 250 FOR IP): Performed by: NURSE PRACTITIONER

## 2018-08-29 PROCEDURE — 6360000002 HC RX W HCPCS: Performed by: INTERNAL MEDICINE

## 2018-08-29 RX ORDER — WARFARIN SODIUM 1 MG/1
1 TABLET ORAL
Status: DISCONTINUED | OUTPATIENT
Start: 2018-08-29 | End: 2018-08-29 | Stop reason: HOSPADM

## 2018-08-29 RX ORDER — METOPROLOL SUCCINATE 25 MG/1
25 TABLET, EXTENDED RELEASE ORAL DAILY
Qty: 30 TABLET | Refills: 0 | Status: SHIPPED | OUTPATIENT
Start: 2018-08-30 | End: 2018-09-29

## 2018-08-29 RX ADMIN — CINACALCET HYDROCHLORIDE 30 MG: 30 TABLET, COATED ORAL at 08:46

## 2018-08-29 RX ADMIN — CLOPIDOGREL BISULFATE 75 MG: 75 TABLET, FILM COATED ORAL at 08:30

## 2018-08-29 RX ADMIN — METOPROLOL SUCCINATE 25 MG: 25 TABLET, EXTENDED RELEASE ORAL at 08:30

## 2018-08-29 RX ADMIN — NEPHROCAP 1 MG: 1 CAP ORAL at 08:30

## 2018-08-29 RX ADMIN — MUPIROCIN: 20 OINTMENT TOPICAL at 08:49

## 2018-08-29 RX ADMIN — CETIRIZINE HYDROCHLORIDE 5 MG: 10 TABLET, FILM COATED ORAL at 08:31

## 2018-08-29 RX ADMIN — DARBEPOETIN ALFA 60 MCG: 60 INJECTION, SOLUTION INTRAVENOUS; SUBCUTANEOUS at 08:46

## 2018-08-29 RX ADMIN — ASPIRIN 81 MG: 81 TABLET ORAL at 08:30

## 2018-08-29 RX ADMIN — SEVELAMER CARBONATE 800 MG: 800 TABLET, FILM COATED ORAL at 08:30

## 2018-08-29 RX ADMIN — ACETAMINOPHEN 650 MG: 325 TABLET, FILM COATED ORAL at 16:43

## 2018-08-29 RX ADMIN — ACETAMINOPHEN 650 MG: 325 TABLET, FILM COATED ORAL at 02:34

## 2018-08-29 RX ADMIN — ACETAMINOPHEN 650 MG: 325 TABLET, FILM COATED ORAL at 09:31

## 2018-08-29 RX ADMIN — Medication 10 ML: at 08:31

## 2018-08-29 RX ADMIN — PANTOPRAZOLE SODIUM 20 MG: 20 TABLET, DELAYED RELEASE ORAL at 08:31

## 2018-08-29 ASSESSMENT — PAIN DESCRIPTION - PAIN TYPE: TYPE: SURGICAL PAIN

## 2018-08-29 ASSESSMENT — PAIN SCALES - GENERAL
PAINLEVEL_OUTOF10: 0
PAINLEVEL_OUTOF10: 5
PAINLEVEL_OUTOF10: 2
PAINLEVEL_OUTOF10: 5
PAINLEVEL_OUTOF10: 0
PAINLEVEL_OUTOF10: 7

## 2018-08-29 ASSESSMENT — PAIN DESCRIPTION - DESCRIPTORS: DESCRIPTORS: ACHING;SORE

## 2018-08-29 ASSESSMENT — PAIN DESCRIPTION - LOCATION: LOCATION: GENERALIZED;SHOULDER

## 2018-08-29 ASSESSMENT — PAIN DESCRIPTION - FREQUENCY: FREQUENCY: INTERMITTENT

## 2018-08-29 ASSESSMENT — PAIN DESCRIPTION - ORIENTATION: ORIENTATION: RIGHT

## 2018-08-29 NOTE — PROGRESS NOTES
Hospitalist Progress Note      PCP: Zahra Dow MD    Date of Admission: 8/19/2018    Chief Complaint: Tingling in shoulders and burning and back    Hospital Course:     [de-identified] y.o. female who presented to [facility] with S medical history of: ESRD on hemodialysis, CAD, essential hypertension, diabetes mellitus. Patient was here from 8/13-8/17/18 with same chest pain symptoms. Patient is in tonight for tingling and \"aching pain\", 5/10 to bilateral shoulders. She is also having burning across her upper chest.  Patient denies actual chest pain, shortness of breath, nausea, dizziness, diaphoresis, palpitations. She denies cough, diarrhea, fevers. She was found in ER of having a troponin of 4. ER spoke with cardiology, start a heparin drip and they will see her in the morning. She had a cardiac cath on 8/15/18 with Dr. Naya Alexandre. She had Rotablator assisted PCI of proximal, distal RCA, ostial to proximal right PDA with placement of 3 drug-eluting stents. She was scheduled to have more stents placed, but did not have the needed equipment. Cardiothoracic surgery was consulted, she is currently not a candidate. \"       Subjective:      Seen in HD. No new c/o.        Medications:  Reviewed    Infusion Medications    dextrose       Scheduled Medications    warfarin  1 mg Oral Once    mupirocin   Nasal BID    metoprolol succinate  25 mg Oral Daily    ceFAZolin  1 g Intravenous QPM    melatonin  1 mg Oral Nightly    docusate sodium  100 mg Oral Daily    insulin lispro  2 Units Subcutaneous TID WC    warfarin (COUMADIN) daily dosing (placeholder)   Other RX Placeholder    darbepoetin cleo-polysorbate  60 mcg Subcutaneous Weekly    aspirin  81 mg Oral Daily    cetirizine  5 mg Oral Daily    atorvastatin  40 mg Oral Daily    b complex-C-folic acid  1 capsule Oral Daily    cinacalcet  30 mg Oral Daily    pantoprazole  20 mg Oral Daily    sevelamer  800 mg Oral BID WC    clopidogrel  75 mg Oral Daily    sodium chloride flush  10 mL Intravenous 2 times per day    insulin lispro  0-6 Units Subcutaneous TID     insulin lispro  0-3 Units Subcutaneous Nightly    nitroglycerin  0.5 inch Topical 4 times per day     PRN Meds: acetaminophen, magnesium hydroxide, ondansetron, sodium chloride flush, glucose, dextrose, glucagon (rDNA), dextrose      Intake/Output Summary (Last 24 hours) at 08/29/18 1320  Last data filed at 08/28/18 2000   Gross per 24 hour   Intake              360 ml   Output                0 ml   Net              360 ml       Physical Exam Performed:    BP (!) 140/60   Pulse 60   Temp 97.8 °F (36.6 °C)   Resp 16   Ht 5' 3\" (1.6 m)   Wt 161 lb 13.1 oz (73.4 kg)   SpO2 98%   BMI 28.66 kg/m²     General appearance: No apparent distress, appears stated age and cooperative. HEENT: Pupils equal, round, and reactive to light. Conjunctivae/corneas clear. Neck: Supple, with full range of motion. No jugular venous distention. Trachea midline. Respiratory:  Normal respiratory effort. Clear to auscultation, bilaterally without Wheezes/Rhonchi. Faint bibasilar rales. Cardiovascular: Regular rate and rhythm with normal S1/S2 without rubs or gallops. LUSB 3/6 systolic murmur, which seems distinct from Lt forearm fistula, +thrill/bruit. Abdomen: Soft, non-tender, non-distended with normal bowel sounds. Musculoskeletal: No clubbing, cyanosis or edema bilaterally. Full range of motion without deformity. Skin: Skin color, texture, turgor normal.  No rashes or lesions. Neurologic:  Neurovascularly intact without any focal sensory/motor deficits.  Cranial nerves: II-XII intact, grossly non-focal.  Psychiatric: Alert and oriented, thought content appropriate, normal insight  Capillary Refill: Brisk,< 3 seconds   Peripheral Pulses: +2 palpable, equal bilaterally       Labs:   Recent Labs      08/27/18   0418  08/29/18   0424   WBC  5.6  6.6   HGB  8.6*  8.9*   HCT  25.2*  26.9*   PLT  148  121*     Recent

## 2018-08-29 NOTE — PROGRESS NOTES
Shift assessment complete and documented on flowsheets. VSS. Four eyes skin assessment and MAR handoff completed with previous RN. Repositioned for comfort. Family at bedside. Call light and over bed table within reach. Will continue to monitor.

## 2018-09-03 ENCOUNTER — HOSPITAL ENCOUNTER (INPATIENT)
Age: 78
LOS: 2 days | Discharge: NURSING, REHAB OR LONG-TERM CARE | DRG: 813 | End: 2018-09-05
Attending: EMERGENCY MEDICINE | Admitting: INTERNAL MEDICINE
Payer: MEDICARE

## 2018-09-03 ENCOUNTER — APPOINTMENT (OUTPATIENT)
Dept: GENERAL RADIOLOGY | Age: 78
DRG: 813 | End: 2018-09-03
Payer: MEDICARE

## 2018-09-03 DIAGNOSIS — D72.829 LEUKOCYTOSIS, UNSPECIFIED TYPE: Primary | ICD-10-CM

## 2018-09-03 DIAGNOSIS — R79.1 ELEVATED INR (INTERNATIONAL NORMALIZED RATIO) DUE TO PRIOR ANTICOAGULANT MEDICATION INGESTION: ICD-10-CM

## 2018-09-03 PROBLEM — D69.6 THROMBOCYTOPENIA (HCC): Status: ACTIVE | Noted: 2018-09-03

## 2018-09-03 PROBLEM — Z99.2 ESRD (END STAGE RENAL DISEASE) ON DIALYSIS (HCC): Status: ACTIVE | Noted: 2018-09-03

## 2018-09-03 PROBLEM — N18.6 ESRD (END STAGE RENAL DISEASE) ON DIALYSIS (HCC): Status: ACTIVE | Noted: 2018-09-03

## 2018-09-03 PROBLEM — T45.515A WARFARIN-INDUCED COAGULOPATHY (HCC): Status: ACTIVE | Noted: 2018-09-03

## 2018-09-03 PROBLEM — R65.10 SIRS (SYSTEMIC INFLAMMATORY RESPONSE SYNDROME) (HCC): Status: ACTIVE | Noted: 2018-09-03

## 2018-09-03 PROBLEM — D68.32 WARFARIN-INDUCED COAGULOPATHY (HCC): Status: ACTIVE | Noted: 2018-09-03

## 2018-09-03 LAB
A/G RATIO: 1.3 (ref 1.1–2.2)
ALBUMIN SERPL-MCNC: 3.4 G/DL (ref 3.4–5)
ALP BLD-CCNC: 117 U/L (ref 40–129)
ALT SERPL-CCNC: <5 U/L (ref 10–40)
ANION GAP SERPL CALCULATED.3IONS-SCNC: 15 MMOL/L (ref 3–16)
AST SERPL-CCNC: 23 U/L (ref 15–37)
BASOPHILS ABSOLUTE: 0.1 K/UL (ref 0–0.2)
BASOPHILS RELATIVE PERCENT: 1 %
BILIRUB SERPL-MCNC: 0.8 MG/DL (ref 0–1)
BUN BLDV-MCNC: 19 MG/DL (ref 7–20)
CALCIUM SERPL-MCNC: 8.9 MG/DL (ref 8.3–10.6)
CHLORIDE BLD-SCNC: 99 MMOL/L (ref 99–110)
CO2: 27 MMOL/L (ref 21–32)
CREAT SERPL-MCNC: 3.3 MG/DL (ref 0.6–1.2)
EOSINOPHILS ABSOLUTE: 0.5 K/UL (ref 0–0.6)
EOSINOPHILS RELATIVE PERCENT: 3.9 %
GFR AFRICAN AMERICAN: 16
GFR NON-AFRICAN AMERICAN: 13
GLOBULIN: 2.7 G/DL
GLUCOSE BLD-MCNC: 198 MG/DL (ref 70–99)
GLUCOSE BLD-MCNC: 77 MG/DL (ref 70–99)
GLUCOSE BLD-MCNC: 82 MG/DL (ref 70–99)
HCT VFR BLD CALC: 30.5 % (ref 36–48)
HEMOGLOBIN: 9.8 G/DL (ref 12–16)
INR BLD: 6.86 (ref 0.86–1.14)
LYMPHOCYTES ABSOLUTE: 0.5 K/UL (ref 1–5.1)
LYMPHOCYTES RELATIVE PERCENT: 4.2 %
MCH RBC QN AUTO: 31.1 PG (ref 26–34)
MCHC RBC AUTO-ENTMCNC: 32.3 G/DL (ref 31–36)
MCV RBC AUTO: 96.3 FL (ref 80–100)
MONOCYTES ABSOLUTE: 0.9 K/UL (ref 0–1.3)
MONOCYTES RELATIVE PERCENT: 7.3 %
NEUTROPHILS ABSOLUTE: 10 K/UL (ref 1.7–7.7)
NEUTROPHILS RELATIVE PERCENT: 83.6 %
PDW BLD-RTO: 15.3 % (ref 12.4–15.4)
PERFORMED ON: ABNORMAL
PERFORMED ON: NORMAL
PLATELET # BLD: 71 K/UL (ref 135–450)
PLATELET SLIDE REVIEW: ABNORMAL
PMV BLD AUTO: 9.2 FL (ref 5–10.5)
POTASSIUM SERPL-SCNC: 3.8 MMOL/L (ref 3.5–5.1)
PROTHROMBIN TIME: 78.2 SEC (ref 9.8–13)
RBC # BLD: 3.16 M/UL (ref 4–5.2)
SLIDE REVIEW: ABNORMAL
SODIUM BLD-SCNC: 141 MMOL/L (ref 136–145)
TOTAL PROTEIN: 6.1 G/DL (ref 6.4–8.2)
WBC # BLD: 12 K/UL (ref 4–11)

## 2018-09-03 PROCEDURE — 2580000003 HC RX 258: Performed by: HOSPITALIST

## 2018-09-03 PROCEDURE — 6360000002 HC RX W HCPCS: Performed by: HOSPITALIST

## 2018-09-03 PROCEDURE — 94667 MNPJ CHEST WALL 1ST: CPT

## 2018-09-03 PROCEDURE — 93005 ELECTROCARDIOGRAM TRACING: CPT | Performed by: NURSE PRACTITIONER

## 2018-09-03 PROCEDURE — 87641 MR-STAPH DNA AMP PROBE: CPT

## 2018-09-03 PROCEDURE — 94664 DEMO&/EVAL PT USE INHALER: CPT

## 2018-09-03 PROCEDURE — 99285 EMERGENCY DEPT VISIT HI MDM: CPT

## 2018-09-03 PROCEDURE — 71046 X-RAY EXAM CHEST 2 VIEWS: CPT

## 2018-09-03 PROCEDURE — 85610 PROTHROMBIN TIME: CPT

## 2018-09-03 PROCEDURE — 2580000003 HC RX 258

## 2018-09-03 PROCEDURE — 85025 COMPLETE CBC W/AUTO DIFF WBC: CPT

## 2018-09-03 PROCEDURE — 80053 COMPREHEN METABOLIC PANEL: CPT

## 2018-09-03 PROCEDURE — 87040 BLOOD CULTURE FOR BACTERIA: CPT

## 2018-09-03 PROCEDURE — 6370000000 HC RX 637 (ALT 250 FOR IP): Performed by: HOSPITALIST

## 2018-09-03 PROCEDURE — 1200000000 HC SEMI PRIVATE

## 2018-09-03 RX ORDER — OMEPRAZOLE 20 MG/1
20 CAPSULE, DELAYED RELEASE ORAL DAILY
Status: DISCONTINUED | OUTPATIENT
Start: 2018-09-04 | End: 2018-09-05 | Stop reason: HOSPADM

## 2018-09-03 RX ORDER — LOSARTAN POTASSIUM 25 MG/1
25 TABLET ORAL DAILY
Status: DISCONTINUED | OUTPATIENT
Start: 2018-09-04 | End: 2018-09-05 | Stop reason: HOSPADM

## 2018-09-03 RX ORDER — SODIUM CHLORIDE 0.9 % (FLUSH) 0.9 %
10 SYRINGE (ML) INJECTION PRN
Status: DISCONTINUED | OUTPATIENT
Start: 2018-09-03 | End: 2018-09-05 | Stop reason: HOSPADM

## 2018-09-03 RX ORDER — FUROSEMIDE 80 MG
80 TABLET ORAL 2 TIMES DAILY
Status: DISCONTINUED | OUTPATIENT
Start: 2018-09-03 | End: 2018-09-04

## 2018-09-03 RX ORDER — SODIUM CHLORIDE 9 MG/ML
INJECTION, SOLUTION INTRAVENOUS
Status: COMPLETED
Start: 2018-09-03 | End: 2018-09-03

## 2018-09-03 RX ORDER — INSULIN GLARGINE 100 [IU]/ML
6 INJECTION, SOLUTION SUBCUTANEOUS NIGHTLY
Status: DISCONTINUED | OUTPATIENT
Start: 2018-09-03 | End: 2018-09-04

## 2018-09-03 RX ORDER — HYDRALAZINE HYDROCHLORIDE 25 MG/1
25 TABLET, FILM COATED ORAL
Status: DISCONTINUED | OUTPATIENT
Start: 2018-09-03 | End: 2018-09-05 | Stop reason: HOSPADM

## 2018-09-03 RX ORDER — INSULIN GLARGINE 100 [IU]/ML
12 INJECTION, SOLUTION SUBCUTANEOUS NIGHTLY
Status: DISCONTINUED | OUTPATIENT
Start: 2018-09-03 | End: 2018-09-03

## 2018-09-03 RX ORDER — HYDRALAZINE HYDROCHLORIDE 25 MG/1
50 TABLET, FILM COATED ORAL
Status: DISCONTINUED | OUTPATIENT
Start: 2018-09-04 | End: 2018-09-05 | Stop reason: HOSPADM

## 2018-09-03 RX ORDER — FLECAINIDE ACETATE 100 MG/1
50 TABLET ORAL 2 TIMES DAILY
Status: DISCONTINUED | OUTPATIENT
Start: 2018-09-03 | End: 2018-09-05 | Stop reason: HOSPADM

## 2018-09-03 RX ORDER — SEVELAMER CARBONATE 800 MG/1
800 TABLET, FILM COATED ORAL 2 TIMES DAILY WITH MEALS
Status: DISCONTINUED | OUTPATIENT
Start: 2018-09-03 | End: 2018-09-05 | Stop reason: HOSPADM

## 2018-09-03 RX ORDER — CLOPIDOGREL BISULFATE 75 MG/1
75 TABLET ORAL DAILY
Status: DISCONTINUED | OUTPATIENT
Start: 2018-09-04 | End: 2018-09-05 | Stop reason: HOSPADM

## 2018-09-03 RX ORDER — IPRATROPIUM BROMIDE AND ALBUTEROL SULFATE 2.5; .5 MG/3ML; MG/3ML
1 SOLUTION RESPIRATORY (INHALATION) EVERY 4 HOURS PRN
Status: DISCONTINUED | OUTPATIENT
Start: 2018-09-03 | End: 2018-09-05 | Stop reason: HOSPADM

## 2018-09-03 RX ORDER — CINACALCET 30 MG/1
30 TABLET, FILM COATED ORAL DAILY
Status: DISCONTINUED | OUTPATIENT
Start: 2018-09-04 | End: 2018-09-05 | Stop reason: HOSPADM

## 2018-09-03 RX ORDER — IPRATROPIUM BROMIDE AND ALBUTEROL SULFATE 2.5; .5 MG/3ML; MG/3ML
1 SOLUTION RESPIRATORY (INHALATION) 4 TIMES DAILY
Status: DISCONTINUED | OUTPATIENT
Start: 2018-09-03 | End: 2018-09-03

## 2018-09-03 RX ORDER — HYDRALAZINE HYDROCHLORIDE 25 MG/1
50 TABLET, FILM COATED ORAL
Status: DISCONTINUED | OUTPATIENT
Start: 2018-09-03 | End: 2018-09-05 | Stop reason: HOSPADM

## 2018-09-03 RX ORDER — AMLODIPINE BESYLATE 5 MG/1
10 TABLET ORAL NIGHTLY
Status: DISCONTINUED | OUTPATIENT
Start: 2018-09-03 | End: 2018-09-05 | Stop reason: HOSPADM

## 2018-09-03 RX ORDER — ATORVASTATIN CALCIUM 80 MG/1
80 TABLET, FILM COATED ORAL DAILY
Status: DISCONTINUED | OUTPATIENT
Start: 2018-09-04 | End: 2018-09-05 | Stop reason: HOSPADM

## 2018-09-03 RX ORDER — NICOTINE POLACRILEX 4 MG
15 LOZENGE BUCCAL PRN
Status: DISCONTINUED | OUTPATIENT
Start: 2018-09-03 | End: 2018-09-05 | Stop reason: HOSPADM

## 2018-09-03 RX ORDER — DEXTROSE MONOHYDRATE 50 MG/ML
100 INJECTION, SOLUTION INTRAVENOUS PRN
Status: DISCONTINUED | OUTPATIENT
Start: 2018-09-03 | End: 2018-09-05 | Stop reason: HOSPADM

## 2018-09-03 RX ORDER — DEXTROSE MONOHYDRATE 25 G/50ML
12.5 INJECTION, SOLUTION INTRAVENOUS PRN
Status: DISCONTINUED | OUTPATIENT
Start: 2018-09-03 | End: 2018-09-05 | Stop reason: HOSPADM

## 2018-09-03 RX ORDER — METOPROLOL SUCCINATE 25 MG/1
25 TABLET, EXTENDED RELEASE ORAL DAILY
Status: DISCONTINUED | OUTPATIENT
Start: 2018-09-04 | End: 2018-09-05 | Stop reason: HOSPADM

## 2018-09-03 RX ORDER — CHOLECALCIFEROL (VITAMIN D3) 10 MCG
1 TABLET ORAL DAILY
Status: DISCONTINUED | OUTPATIENT
Start: 2018-09-04 | End: 2018-09-05 | Stop reason: HOSPADM

## 2018-09-03 RX ORDER — SODIUM CHLORIDE 0.9 % (FLUSH) 0.9 %
10 SYRINGE (ML) INJECTION EVERY 12 HOURS SCHEDULED
Status: DISCONTINUED | OUTPATIENT
Start: 2018-09-03 | End: 2018-09-05 | Stop reason: HOSPADM

## 2018-09-03 RX ADMIN — AMLODIPINE BESYLATE 10 MG: 5 TABLET ORAL at 22:05

## 2018-09-03 RX ADMIN — VANCOMYCIN HYDROCHLORIDE 1000 MG: 1 INJECTION, POWDER, LYOPHILIZED, FOR SOLUTION INTRAVENOUS at 19:33

## 2018-09-03 RX ADMIN — SODIUM CHLORIDE 250 ML: 9 INJECTION, SOLUTION INTRAVENOUS at 17:25

## 2018-09-03 RX ADMIN — AZITHROMYCIN MONOHYDRATE 250 MG: 500 INJECTION, POWDER, LYOPHILIZED, FOR SOLUTION INTRAVENOUS at 18:15

## 2018-09-03 RX ADMIN — SEVELAMER CARBONATE 800 MG: 800 TABLET, FILM COATED ORAL at 17:25

## 2018-09-03 RX ADMIN — FUROSEMIDE 80 MG: 80 TABLET ORAL at 22:05

## 2018-09-03 RX ADMIN — FLECAINIDE ACETATE 50 MG: 100 TABLET ORAL at 22:02

## 2018-09-03 RX ADMIN — HYDRALAZINE HYDROCHLORIDE 50 MG: 25 TABLET, FILM COATED ORAL at 17:25

## 2018-09-03 RX ADMIN — CEFEPIME HYDROCHLORIDE 1 G: 1 INJECTION, POWDER, FOR SOLUTION INTRAMUSCULAR; INTRAVENOUS at 17:24

## 2018-09-03 RX ADMIN — SODIUM CHLORIDE, PRESERVATIVE FREE 10 ML: 5 INJECTION INTRAVENOUS at 22:05

## 2018-09-03 RX ADMIN — INSULIN LISPRO 1 UNITS: 100 INJECTION, SOLUTION INTRAVENOUS; SUBCUTANEOUS at 22:06

## 2018-09-03 ASSESSMENT — ENCOUNTER SYMPTOMS
ABDOMINAL PAIN: 0
VOMITING: 0
COUGH: 0
WHEEZING: 0
COLOR CHANGE: 0
NAUSEA: 0
SHORTNESS OF BREATH: 0
DIARRHEA: 0
BACK PAIN: 0

## 2018-09-03 ASSESSMENT — PAIN SCALES - GENERAL
PAINLEVEL_OUTOF10: 0
PAINLEVEL_OUTOF10: 0

## 2018-09-03 NOTE — H&P
Thyroid disease     thyroid removed. Past Surgical History:          Procedure Laterality Date    CARDIAC CATHETERIZATION  08/13/2018    3 Vessel coronary disease, referred to CABG    CHOLECYSTECTOMY      CORONARY ANGIOPLASTY WITH STENT PLACEMENT      DIALYSIS FISTULA CREATION      left upper arm    EYE SURGERY      cataracts, retinal detatched, fluid removal    PACEMAKER INSERTION  08/27/2018    Dual Chamber Pacemaker Insertion    PTCA  08/15/2018    SHYANNE - 2.75 x 16 to pPDA, 3.5 x 28 to dRCA and 3.5 x 20 mRCA    PTCA  08/20/2018    SHYANNE- 3.5 x 16 to mCx and 3.5 x 16 to pLAD    THYROID SURGERY         Medications Prior to Admission:      Prior to Admission medications    Medication Sig Start Date End Date Taking?  Authorizing Provider   metoprolol succinate (TOPROL XL) 25 MG extended release tablet Take 1 tablet by mouth daily 8/30/18 9/29/18  Erick Coker MD   aspirin 81 MG EC tablet Take 1 tablet by mouth daily for 10 days 8/22/18 9/1/18  Fito Brown MD   clopidogrel (PLAVIX) 75 MG tablet Take 1 tablet by mouth daily 8/17/18   Bridgette Boo MD   atorvastatin (LIPITOR) 80 MG tablet Take 1 tablet by mouth daily 8/18/18   Bridgette Boo MD   losartan (COZAAR) 25 MG tablet Take 1 tablet by mouth daily 8/18/18   Bridgette Boo MD   cinacalcet (SENSIPAR) 30 MG tablet Take 30 mg by mouth daily    Historical Provider, MD   insulin glargine (LANTUS) 100 UNIT/ML injection vial Inject 12 Units into the skin nightly     Historical Provider, MD   hydrALAZINE (APRESOLINE) 25 MG tablet Take 50 mg by mouth 3 times daily 50 in am, 25 in afternoon and 50 in pm    Historical Provider, MD   sevelamer (RENVELA) 800 MG tablet Take 1 tablet by mouth 2 times daily (with meals)    Historical Provider, MD   flecainide (TAMBOCOR) 50 MG tablet Take 50 mg by mouth 2 times daily    Historical Provider, MD   furosemide (LASIX) 40 MG tablet Take 80 mg by mouth 2 times daily     Historical Provider, MD B Complex-C-Folic Acid (FLAVIA CAPS PO) Take  by mouth daily. Historical Provider, MD   amlodipine (NORVASC) 10 MG tablet Take 10 mg by mouth nightly     Historical Provider, MD   warfarin (COUMADIN) 2 MG tablet Take 2 mg by mouth daily at 1800     Historical Provider, MD   Omeprazole (PRILOSEC PO) Take 20 mg by mouth daily     Historical Provider, MD       Allergies:  Patient has no known allergies. Social History:      The patient currently lives in a SNF at present    TOBACCO:   reports that she has never smoked. She has never used smokeless tobacco.  ETOH:   reports that she does not drink alcohol. Family History:      Reviewed in detail and negative for DM, CAD, Cancer, CVA. REVIEW OF SYSTEMS:   Pertinent positives as noted in the HPI. All other systems reviewed and negative. PHYSICAL EXAM PERFORMED:    BP (!) 141/51   Pulse 60   Temp 98.2 °F (36.8 °C) (Oral)   Resp 17   Ht 5' 3\" (1.6 m)   Wt 158 lb (71.7 kg)   SpO2 93%   BMI 27.99 kg/m²     General appearance:  No apparent distress, appears stated age and cooperative. HEENT:  Normal cephalic, atraumatic without obvious deformity. Pupils equal, round, and reactive to light. Extra ocular muscles intact. Conjunctivae/corneas clear. Neck: Supple, with full range of motion. No jugular venous distention. Trachea midline. Respiratory:  Rales at bases, diminished in general.  No wheezing appreciated  Cardiovascular:  Regular rate and rhythm with normal S1/S2 without murmurs, rubs or gallops. Abdomen: Soft, non-tender, non-distended with normal bowel sounds. Musculoskeletal:  No clubbing, cyanosis or edema bilaterally. Full range of motion without deformity. Skin: R chest site with PPM, small hematoma appearance, ecchymosis into axilla, breast area  Neurologic:  Neurovascularly intact without any focal sensory/motor deficits.    Psychiatric:  Alert and oriented, thought content appropriate, normal insight  Capillary Refill: Brisk,< 3 seconds   Peripheral Pulses: +2 palpable, equal bilaterally       Labs:     Recent Labs      09/03/18   1242   WBC  12.0*   HGB  9.8*   HCT  30.5*   PLT  71*     Recent Labs      09/03/18   1242   NA  141   K  3.8   CL  99   CO2  27   BUN  19   CREATININE  3.3*   CALCIUM  8.9     Recent Labs      09/03/18   1242   AST  23   ALT  <5*   BILITOT  0.8   ALKPHOS  117     Recent Labs      09/03/18   1242   INR  6.86*     No results for input(s): CKTOTAL, TROPONINI in the last 72 hours. Urinalysis:      Lab Results   Component Value Date    NITRU Negative 04/12/2015    WBCUA 3-5 04/12/2015    BACTERIA Rare 04/12/2015    RBCUA 20-50 04/12/2015    BLOODU LARGE 04/12/2015    SPECGRAV 1.015 04/12/2015    GLUCOSEU Negative 04/12/2015       Radiology:     CXR: I have reviewed the CXR with the following interpretation: Perihilar infiltrates, low lung volumes, cannot see L costophrenic angle  EKG:  I have reviewed the EKG with the following interpretation: atrial pacemaker, Q waves inferiorly, mild t wave inversions anterolaterally    XR CHEST STANDARD (2 VW)    (Results Pending)       ASSESSMENT:    Active Hospital Problems    Diagnosis     HCAP, suspected based on cx and imaging, persistent leukocytosis with L shift    SIRS (systemic inflammatory response syndrome) (HCC) [R65.10], possible underlying PNA, recent abx with cx improvement, worse since off. Recent CNS bacteremia (unclear if was contaminant given both cx obtained at same time), repeat cx 5 hrs later negative.   Cannot exclude recurrent bacteremia given PPM placed during that admit       Warfarin-induced coagulopathy ([facility] [D68.32, T45.515A], LFT wnl, suspect related to dec PO intake       ESRD (end stage renal disease) on dialysis ([facility] [N18.6, Z99.2], s/p HD today, usual MWF       Paroxysmal atrial fibrillation ([facility] [I48.0], recent tachy nanda, s/p PPM 8/27       Stage 5 chronic kidney disease on chronic dialysis ([facility] [N18.6, Z99.2]    Thrombocytopenia,

## 2018-09-03 NOTE — ED PROVIDER NOTES
[facility]  ED  eMERGENCY dEPARTMENT eNCOUnter        Pt Name: Echo Melo  MRN: [id number]  [place] 1940  Date of evaluation: 9/3/2018  Provider: SO Mckeon - DAVONTE  PCP: Alden Elmore MD  ED Attending: No att. providers found    [address] [place] [address]       Chief Complaint   Patient presents with    Abnormal Lab     pt was sent to the ED due to an INR of 8. completed x 2 hours of dialysis. (pt states she recently received stent placement)        HISTORY OF PRESENT ILLNESS   (Location/Symptom, Timing/Onset, Context/Setting, Quality, Duration, Modifying Factors, Severity)  Note limiting factors. Echo Melo is a [de-identified] y.o. female who presents today with not feeling well at dialysis, but states that she feels good on exam. Patient was at dialysis when she began not feeling well, felt short of breath, asked to be taken off her dialysis machine, only for the dialysis tech to tell the patient \"If I take you off the machine I have to send you to the ER\", she tech then called 911. She states she began feeling weak, short of breath, however, denies any chest pain or shortness of breath. She was also told her INR was elevated at 8.0. She has been doing dialysis for the past four years. She states that she was recently admitted and was sent to [facility] for rehab. She was admitted for an MI with stent placement is in needed of a 5-way bypass, however was not a candidate due to her health, however she did have angiogram with angioplasty. She now has a total of five stents and they Shaved a 5 way bypass however the cardiologist and cardiothoracic surgeon do not feel that the patient would tolerate this. Patient is denying any pain on exam.  Denies any recent illnesses, fever, chills, cough or congestion. She really wants to home and states that she feels fine. Therefore, no distal complaints, no additional aggravating or alleviating factors.   The patient presents awake, alert and 16 to pPDA, 3.5 x 28 to dRCA and 3.5 x 20 mRCA    PTCA  08/20/2018    SHYANNE- 3.5 x 16 to mCx and 3.5 x 16 to pLAD    THYROID SURGERY           CURRENT MEDICATIONS       Previous Medications    AMLODIPINE (NORVASC) 10 MG TABLET    Take 10 mg by mouth nightly     ASPIRIN 81 MG EC TABLET    Take 1 tablet by mouth daily for 10 days    ATORVASTATIN (LIPITOR) 80 MG TABLET    Take 1 tablet by mouth daily    B COMPLEX-C-FOLIC ACID (FLAVIA CAPS PO)    Take  by mouth daily. CINACALCET (SENSIPAR) 30 MG TABLET    Take 30 mg by mouth daily    CLOPIDOGREL (PLAVIX) 75 MG TABLET    Take 1 tablet by mouth daily    FLECAINIDE (TAMBOCOR) 50 MG TABLET    Take 50 mg by mouth 2 times daily    FUROSEMIDE (LASIX) 40 MG TABLET    Take 80 mg by mouth 2 times daily     HYDRALAZINE (APRESOLINE) 25 MG TABLET    Take 50 mg by mouth 3 times daily 50 in am, 25 in afternoon and 50 in pm    INSULIN GLARGINE (LANTUS) 100 UNIT/ML INJECTION VIAL    Inject 12 Units into the skin nightly     LOSARTAN (COZAAR) 25 MG TABLET    Take 1 tablet by mouth daily    METOPROLOL SUCCINATE (TOPROL XL) 25 MG EXTENDED RELEASE TABLET    Take 1 tablet by mouth daily    OMEPRAZOLE (PRILOSEC PO)    Take 20 mg by mouth daily     SEVELAMER (RENVELA) 800 MG TABLET    Take 1 tablet by mouth 2 times daily (with meals)    WARFARIN (COUMADIN) 2 MG TABLET    Take 2 mg by mouth daily at 1800          ALLERGIES     Patient has no known allergies. FAMILY HISTORY     History reviewed. No pertinent family history. SOCIAL HISTORY       Social History     Social History    Marital status:       Spouse name: N/A    Number of children: N/A    Years of education: N/A     Social History Main Topics    Smoking status: Never Smoker    Smokeless tobacco: Never Used    Alcohol use No      Comment: rarely    Drug use: No    Sexual activity: No     Other Topics Concern    None     Social History Narrative    None       SCREENINGS             PHYSICAL EXAM    (up to 7 for level 4, 8 or more for level 5)     ED Triage Vitals [09/03/18 1240]   BP Temp Temp Source Pulse Resp SpO2 Height Weight   (!) 141/51 98 °F (36.7 °C) Oral 60 20 95 % 5' 3\" (1.6 m) 158 lb (71.7 kg)       Physical Exam   Constitutional: She is oriented to person, place, and time. She appears well-developed and well-nourished. HENT:   Head: Normocephalic. Right Ear: External ear normal.   Left Ear: External ear normal.   Mouth/Throat: Oropharynx is clear and moist.   Eyes: Right eye exhibits no discharge. Left eye exhibits no discharge. No scleral icterus. Neck: Normal range of motion. Neck supple. Cardiovascular: Normal rate. Patient has history of A. fib however sinus rhythm on the bedside monitor before pulses 2+, no edema observed. Pulmonary/Chest: Effort normal. No respiratory distress. She has no wheezes. Abdominal: Soft. Bowel sounds are normal. There is no rebound. Musculoskeletal: Normal range of motion. Neurological: She is alert and oriented to person, place, and time. GCS eye subscore is 4. GCS verbal subscore is 5. GCS motor subscore is 6. Patient is awake, alert answering questions appropriately, neurologically intact no focal deficits. No saddle area paresthesias. No facial asymmetry. Tongue is midline. She follows commands correctly, no obvious pronator drift or weakness greater than the other side. Skin: Skin is warm. She is not diaphoretic. No pallor. Psychiatric: She has a normal mood and affect. Her behavior is normal.   Nursing note and vitals reviewed.       DIAGNOSTIC RESULTS   LABS:    Labs Reviewed   CBC WITH AUTO DIFFERENTIAL - Abnormal; Notable for the following:        Result Value    WBC 12.0 (*)     RBC 3.16 (*)     Hemoglobin 9.8 (*)     Hematocrit 30.5 (*)     Platelets 71 (*)     Neutrophils # 10.0 (*)     Lymphocytes # 0.5 (*)     All other components within normal limits    Narrative:     Performed at:  [facility]  [address],  [place], [facility] [place]   Phone [phone]   COMPREHENSIVE METABOLIC PANEL - Abnormal; Notable for the following:     CREATININE 3.3 (*)     GFR Non- 13 (*)     GFR  16 (*)     Total Protein 6.1 (*)     ALT <5 (*)     All other components within normal limits    Narrative:     Performed at:  [address], [facility] [place]   Phone [phone] - Abnormal; Notable for the following:     Protime 78.2 (*)     INR 6.86 (*)     All other components within normal limits    Narrative:     [facility] tel. [phone],  Coag results called to and read back by Reuben Eugene, 09/03/2018  13:25, by Deloris Deal  Performed at:  [place] [address] [place]   Phone [phone]   CULTURE BLOOD #1   CULTURE BLOOD #2   URINE RT REFLEX TO CULTURE       All other labs were within normal range or not returned as of this dictation. EKG: All EKG's are interpreted by the Emergency Department Physician who either signs or Co-signs this chart in the absence of a cardiologist.  Please see their note for interpretation of EKG. RADIOLOGY:   Non-plain film images such as CT, Ultrasound and MRI are read by the radiologist. Plain radiographic images are visualized and preliminarily interpreted by the  ED Provider with the below findings:        Interpretation per the Radiologist below, if available at the time of this note:    XR CHEST STANDARD (2 VW)   Final Result   Findings suggest congestive heart failure           No results found.       PROCEDURES   Unless otherwise noted below, none     Procedures    CRITICAL CARE TIME   N/A    CONSULTS:  IP CONSULT TO HOSPITALIST  IP CONSULT TO NEPHROLOGY      EMERGENCY DEPARTMENT COURSE and DIFFERENTIAL DIAGNOSIS/MDM:   Vitals:    Vitals:    09/03/18 1240 09/03/18 1343 09/03/18 1431 09/03/18 1531   BP: (!) 141/51      Pulse:

## 2018-09-03 NOTE — ED PROVIDER NOTES
American 13 (A) >60    GFR  16 (A) >60    Calcium 8.9 8.3 - 10.6 mg/dL    Total Protein 6.1 (L) 6.4 - 8.2 g/dL    Alb 3.4 3.4 - 5.0 g/dL    Albumin/Globulin Ratio 1.3 1.1 - 2.2    Total Bilirubin 0.8 0.0 - 1.0 mg/dL    Alkaline Phosphatase 117 40 - 129 U/L    ALT <5 (L) 10 - 40 U/L    AST 23 15 - 37 U/L    Globulin 2.7 g/dL   Protime-INR   Result Value Ref Range    Protime 78.2 (H) 9.8 - 13.0 sec    INR 6.86 (HH) 0.86 - 1.14   EKG 12 Lead   Result Value Ref Range    Ventricular Rate 60 BPM    Atrial Rate 60 BPM    QRS Duration 104 ms    Q-T Interval 454 ms    QTc Calculation (Bazett) 454 ms    R Axis -18 degrees    T Axis 56 degrees    Diagnosis       Electronic atrial pacemakerLow voltage QRSSeptal infarct , age undeterminedAbnormal ECGWhen compared with ECG of 26-AUG-2018 13:34,Electronic atrial pacemaker has replaced Atrial fibrillationSeptal infarct is now PresentNonspecific T wave abnormality now evident in Anterior leads       RADIOLOGY  XR CHEST STANDARD (2 VW)    (Results Pending)       Medical Decision Making and Plan:  Pertinent Labs & Imaging studies reviewed. (See chart for details)  I agree with ROSIE assessment and plan. Michelle Guo is a [de-identified] y.o. female who presented to the emergency department with Elevated INR and shortness of breath. She came from dialysis. She is not had medication changes other than a decrease in her Coumadin dose. She has a leukocytosis in the ED. She had a recent bacteremia, and permanent pacemaker placed. The unexplained neutrophil predominant leukocytosis and dyspnea are somewhat concerning for an occult bacteremia. Due to her age and comorbidities top of that, she'll benefit from observation in the hospital.  She has not had definite fevers, but does feel run down. Patient was given the following medications:  Medications - No data to display    The patient tolerated their visit well.    The patient and / or the family were informed of the

## 2018-09-03 NOTE — PLAN OF CARE
Problem: Falls - Risk of:  Goal: Will remain free from falls  Will remain free from falls   Outcome: Ongoing  Pt remains free from fall and injury. Non-skid slippers on. Fall risk band on wrist. Instructed to call for assistance. Call light in reach, will monitor.

## 2018-09-03 NOTE — PROGRESS NOTES
Patient admitted to room 361 from ED. Patient oriented to room, call light, bed rails, phone, lights and bathroom. Patient instructed about the schedule of the day including: vital sign frequency, lab draws, possible tests, frequency of MD and staff rounds, including RN/MD rounding together at bedside, daily weights, and I &O's. Patient instructed about prescribed diet, how to use 8MENU, and television. Telemetry box 106 in place, patient aware of placement and reason. Bed locked, in lowest position, side rails up 2/4, call light within reach. Will continue to monitor.

## 2018-09-03 NOTE — PROGRESS NOTES
RESPIRATORY THERAPY ASSESSMENT    Name:  Juaquin LOPES [facility] Record Number:  [id number]  Age: [de-identified] y.o. Gender: female  : 1940  Today's Date:  9/3/2018  Room:  [facility]036-01    Assessment     Is the patient being admitted for a COPD or Asthma exacerbation? No   (If yes the patient will be seen every 4 hours for the first 24 hours and then reassessed)    Patient Admission Diagnosis      Allergies  No Known Allergies    Minimum Predicted Vital Capacity:     na          Actual Vital Capacity:      na          Pulmonary History:No history  Home Oxygen Therapy:  room air  Home Respiratory Therapy:None   Current Respiratory Therapy:  Duoneb          Respiratory Severity Index(RSI)   Patients with orders for inhalation medications, oxygen, or any therapeutic treatment modality will be placed on Respiratory Protocol. They will be assessed with the first treatment and at least every 72 hours thereafter. The following severity scale will be used to determine frequency of treatment intervention.     Smoking History: No Smoking History = 0    Social History  Social History   Substance Use Topics    Smoking status: Never Smoker    Smokeless tobacco: Never Used    Alcohol use No      Comment: rarely       Recent Surgical History: Thoracic or Upper Airway = 3  Past Surgical History  Past Surgical History:   Procedure Laterality Date    CARDIAC CATHETERIZATION  2018    3 Vessel coronary disease, referred to CABG    CHOLECYSTECTOMY      CORONARY ANGIOPLASTY WITH STENT PLACEMENT      DIALYSIS FISTULA CREATION      left upper arm    EYE SURGERY      cataracts, retinal detatched, fluid removal    PACEMAKER INSERTION  2018    Dual Chamber Pacemaker Insertion    PTCA  08/15/2018    SHYANNE - 2.75 x 16 to pPDA, 3.5 x 28 to dRCA and 3.5 x 20 mRCA    PTCA  2018    SHYANNE- 3.5 x 16 to mCx and 3.5 x 16 to pLAD    THYROID SURGERY         Level of Consciousness: Alert, Oriented, and Cooperative = 0    Level of Activity: Walking with assistance = 1    Respiratory Pattern: Regular Pattern; RR 8-20 = 0    Breath Sounds: Clear = 0    Sputum   ,  , Sputum How Obtained: Cough on request  Cough: Strong, spontaneous, non-productive = 0    Vital Signs   BP (!) 149/54   Pulse 60   Temp 98.3 °F (36.8 °C) (Oral)   Resp 22   Ht 5' 3\" (1.6 m)   Wt 158 lb (71.7 kg)   SpO2 96%   BMI 27.99 kg/m²   SPO2 (COPD values may differ): Greater than or equal to 92% on room air = 0    Peak Flow (asthma only): not applicable = 0    RSI: 0-4 = See once and convert to home regimen or discontinue        Plan       Goals: medication delivery    Patient/caregiver was educated on the proper method of use for Respiratory Care Devices:  Yes      Level of patient/caregiver understanding able to:   [] Verbalize understanding   [] Demonstrate understanding       [] Teach back        [] Needs reinforcement       []  No available caregiver               []  Other:     Response to education:  Excellent     Is patient being placed on Home Treatment Regimen? No     Does the patient have everything they need prior to discharge? NA     Comments: chart and home meds reviewed    Plan of Care: change Duoneb to PRN    Electronically signed by Rupal Foreman RCP on 9/3/2018 at 5:12 PM    Respiratory Protocol Guidelines     1. Assessment and treatment by Respiratory Therapy will be initiated for medication and therapeutic interventions upon initiation of aerosolized medication. 2. Physician will be contacted for respiratory rate (RR) greater than 35 breaths per minute. Therapy will be held for heart rate (HR) greater than 140 beats per minute, pending direction from physician. 3. Bronchodilators will be administered via Metered Dose Inhaler (MDI) with spacer when the following criteria are met:  a. Alert and cooperative     b. HR < 140 bpm  c. RR < 30 bpm                d. Can demonstrate a 23 second inspiratory hold  4.  Bronchodilators will be administered via Hand Held Nebulizer [facility]) to patients when ANY of the following criteria are met  a. Incognizant or uncooperative          b. Patients treated with HHN at Home        c. Unable to demonstrate proper use of MDI with spacer     d. RR > 30 bpm   5. Bronchodilators will be delivered via Metered Dose Inhaler (MDI), HHN, Aerogen to intubated patients on mechanical ventilation. 6. Inhalation medication orders will be delivered and/or substituted as outlined below. Aerosolized Medications Ordering and Administration Guidelines:    1. All Medications will be ordered by a physician, and their frequency and/or modality will be adjusted as defined by the patients Respiratory Severity Index (RSI) score. 2. If the patient does not have documented COPD, consider discontinuing anticholinergics when RSI is less than 9.  3. If the bronchospasm worsens (increased RSI), then the bronchodilator frequency can be increased to a maximum of every 4 hours. If greater than every 4 hours is required, the physician will be contacted. 4. If the bronchospasm improves, the frequency of the bronchodilator can be decreased, based on the patient's RSI, but not less than home treatment regimen frequency. 5. Bronchodilator(s) will be discontinued if patient has a RSI less than 9 and has received no scheduled or as needed treatment for 72  Hrs. Patients Ordered on a Mucolytic Agent:    1. Must always be administered with a bronchodilator. 2. Discontinue if patient experiences worsened bronchospasm, or secretions have lessened to the point that the patient is able to clear them with a cough. Anti-inflammatory and Combination Medications:    1. If the patient lacks prior history of lung disease, is not using inhaled anti-inflammatory medication at home, and lacks wheezing by examination or by history for at least 24 hours, contact physician for possible discontinuation.

## 2018-09-04 LAB
ALBUMIN SERPL-MCNC: 3 G/DL (ref 3.4–5)
ALP BLD-CCNC: 96 U/L (ref 40–129)
ALT SERPL-CCNC: <5 U/L (ref 10–40)
ANION GAP SERPL CALCULATED.3IONS-SCNC: 12 MMOL/L (ref 3–16)
AST SERPL-CCNC: 20 U/L (ref 15–37)
BASOPHILS ABSOLUTE: 0.1 K/UL (ref 0–0.2)
BASOPHILS RELATIVE PERCENT: 1.9 %
BILIRUB SERPL-MCNC: 0.6 MG/DL (ref 0–1)
BILIRUBIN DIRECT: <0.2 MG/DL (ref 0–0.3)
BILIRUBIN, INDIRECT: ABNORMAL MG/DL (ref 0–1)
BUN BLDV-MCNC: 25 MG/DL (ref 7–20)
CALCIUM SERPL-MCNC: 8.4 MG/DL (ref 8.3–10.6)
CHLORIDE BLD-SCNC: 96 MMOL/L (ref 99–110)
CO2: 29 MMOL/L (ref 21–32)
CREAT SERPL-MCNC: 4.7 MG/DL (ref 0.6–1.2)
EOSINOPHILS ABSOLUTE: 0.5 K/UL (ref 0–0.6)
EOSINOPHILS RELATIVE PERCENT: 6.7 %
GFR AFRICAN AMERICAN: 11
GFR NON-AFRICAN AMERICAN: 9
GLUCOSE BLD-MCNC: 73 MG/DL (ref 70–99)
GLUCOSE BLD-MCNC: 79 MG/DL (ref 70–99)
GLUCOSE BLD-MCNC: 81 MG/DL (ref 70–99)
GLUCOSE BLD-MCNC: 91 MG/DL (ref 70–99)
GLUCOSE BLD-MCNC: 99 MG/DL (ref 70–99)
HCT VFR BLD CALC: 27.6 % (ref 36–48)
HEMOGLOBIN: 9 G/DL (ref 12–16)
INR BLD: 6.53 (ref 0.86–1.14)
LYMPHOCYTES ABSOLUTE: 0.7 K/UL (ref 1–5.1)
LYMPHOCYTES RELATIVE PERCENT: 10.7 %
MCH RBC QN AUTO: 31.4 PG (ref 26–34)
MCHC RBC AUTO-ENTMCNC: 32.5 G/DL (ref 31–36)
MCV RBC AUTO: 96.6 FL (ref 80–100)
MONOCYTES ABSOLUTE: 0.7 K/UL (ref 0–1.3)
MONOCYTES RELATIVE PERCENT: 10.9 %
MRSA SCREEN RT-PCR: NORMAL
NEUTROPHILS ABSOLUTE: 4.7 K/UL (ref 1.7–7.7)
NEUTROPHILS RELATIVE PERCENT: 69.8 %
PDW BLD-RTO: 15.5 % (ref 12.4–15.4)
PERFORMED ON: NORMAL
PLATELET # BLD: 86 K/UL (ref 135–450)
PMV BLD AUTO: 9.3 FL (ref 5–10.5)
POTASSIUM REFLEX MAGNESIUM: 4.7 MMOL/L (ref 3.5–5.1)
PROTHROMBIN TIME: 74.4 SEC (ref 9.8–13)
RBC # BLD: 2.85 M/UL (ref 4–5.2)
SODIUM BLD-SCNC: 137 MMOL/L (ref 136–145)
TOTAL PROTEIN: 5.4 G/DL (ref 6.4–8.2)
VANCOMYCIN TROUGH: 15.9 UG/ML (ref 10–20)
WBC # BLD: 6.8 K/UL (ref 4–11)

## 2018-09-04 PROCEDURE — 93010 ELECTROCARDIOGRAM REPORT: CPT | Performed by: INTERNAL MEDICINE

## 2018-09-04 PROCEDURE — 6360000002 HC RX W HCPCS: Performed by: INTERNAL MEDICINE

## 2018-09-04 PROCEDURE — 1200000000 HC SEMI PRIVATE

## 2018-09-04 PROCEDURE — 80202 ASSAY OF VANCOMYCIN: CPT

## 2018-09-04 PROCEDURE — 6370000000 HC RX 637 (ALT 250 FOR IP): Performed by: HOSPITALIST

## 2018-09-04 PROCEDURE — 80048 BASIC METABOLIC PNL TOTAL CA: CPT

## 2018-09-04 PROCEDURE — 6360000002 HC RX W HCPCS: Performed by: HOSPITALIST

## 2018-09-04 PROCEDURE — 85025 COMPLETE CBC W/AUTO DIFF WBC: CPT

## 2018-09-04 PROCEDURE — 85610 PROTHROMBIN TIME: CPT

## 2018-09-04 PROCEDURE — 2580000003 HC RX 258: Performed by: INTERNAL MEDICINE

## 2018-09-04 PROCEDURE — 36415 COLL VENOUS BLD VENIPUNCTURE: CPT

## 2018-09-04 PROCEDURE — 2580000003 HC RX 258: Performed by: HOSPITALIST

## 2018-09-04 PROCEDURE — 80076 HEPATIC FUNCTION PANEL: CPT

## 2018-09-04 RX ADMIN — SODIUM CHLORIDE, PRESERVATIVE FREE 10 ML: 5 INJECTION INTRAVENOUS at 20:59

## 2018-09-04 RX ADMIN — AMLODIPINE BESYLATE 10 MG: 5 TABLET ORAL at 20:59

## 2018-09-04 RX ADMIN — ATORVASTATIN CALCIUM 80 MG: 80 TABLET, FILM COATED ORAL at 08:15

## 2018-09-04 RX ADMIN — INSULIN GLARGINE 6 UNITS: 100 INJECTION, SOLUTION SUBCUTANEOUS at 00:27

## 2018-09-04 RX ADMIN — FUROSEMIDE 80 MG: 80 TABLET ORAL at 08:15

## 2018-09-04 RX ADMIN — SODIUM CHLORIDE, PRESERVATIVE FREE 10 ML: 5 INJECTION INTRAVENOUS at 08:16

## 2018-09-04 RX ADMIN — CLOPIDOGREL BISULFATE 75 MG: 75 TABLET ORAL at 08:14

## 2018-09-04 RX ADMIN — FLECAINIDE ACETATE 50 MG: 100 TABLET ORAL at 20:58

## 2018-09-04 RX ADMIN — CINACALCET HYDROCHLORIDE 30 MG: 30 TABLET, COATED ORAL at 08:16

## 2018-09-04 RX ADMIN — NEPHROCAP 1 MG: 1 CAP ORAL at 08:15

## 2018-09-04 RX ADMIN — CEFEPIME HYDROCHLORIDE 1 G: 1 INJECTION, POWDER, FOR SOLUTION INTRAMUSCULAR; INTRAVENOUS at 06:03

## 2018-09-04 RX ADMIN — METOPROLOL SUCCINATE 25 MG: 25 TABLET, EXTENDED RELEASE ORAL at 08:15

## 2018-09-04 RX ADMIN — OMEPRAZOLE 20 MG: 20 CAPSULE, DELAYED RELEASE ORAL at 08:14

## 2018-09-04 RX ADMIN — VANCOMYCIN HYDROCHLORIDE 500 MG: 500 INJECTION, POWDER, LYOPHILIZED, FOR SOLUTION INTRAVENOUS at 10:29

## 2018-09-04 RX ADMIN — LOSARTAN POTASSIUM 25 MG: 25 TABLET, FILM COATED ORAL at 08:16

## 2018-09-04 RX ADMIN — FLECAINIDE ACETATE 50 MG: 100 TABLET ORAL at 08:23

## 2018-09-04 RX ADMIN — SEVELAMER CARBONATE 800 MG: 800 TABLET, FILM COATED ORAL at 17:16

## 2018-09-04 ASSESSMENT — PAIN SCALES - GENERAL
PAINLEVEL_OUTOF10: 0

## 2018-09-04 NOTE — PROGRESS NOTES
Patient has refused breakfast and lunch trays. Patient has not urinated today. Patient is a dialysis patient and produces little to no urine. RN is aware.

## 2018-09-04 NOTE — PROGRESS NOTES
Pt called RN stating she was SOB. VSS on RA.       09/04/18 0015   Vital Signs   Temp 98.4 °F (36.9 °C)   Temp Source Oral   Pulse 62   Heart Rate Source Monitor   Resp 22   BP (!) 116/48   BP Location Right upper arm   Patient Position High fowlers   Level of Consciousness 0   MEWS Score 2   Patient Currently in Pain Denies   Pain Assessment   Pain Level 0   Oxygen Therapy   SpO2 96 %   O2 Device None (Room air)       Repositioned pt in chair. Pt currently sleeping in chair. Will monitor.

## 2018-09-04 NOTE — CARE COORDINATION
Spoke with patient at bedside. Previous visit from last week noted. Patient came here from [facility]. She is anxious to get back and states she was getting stronger just in the short time she was there. She receives dialysis outpatient with Rhina Justice on Monday, Wednesday and Friday. Call placed to [facility] at [facility] and notified of discharge plan.

## 2018-09-04 NOTE — PROGRESS NOTES
Nutrition Assessment    Type and Reason for Visit: Initial, Positive Nutrition Screen    Malnutrition Assessment:  · Malnutrition Status: No malnutrition    Nutrition Diagnosis:   · Problem: No nutrition diagnosis at this time    Nutrition Assessment:  · Subjective Assessment: + screen for diet education. Pt is a [de-identified] yo female with Hx of CAD, DM, GERD, HTN, ARF on dialysis MWF, who was admitted with worsening SOB, abnormal labs. Patient seen in room this date. She reports having an okay appetite with increasing PO intake the last few weeks. Per EMR, PO intakes are %. Pt does not follow a carb control diet, she reports eating whatever she likes. Did not express interest in following carb control diet. Left edu materials at bedside. Encouraged meals as able and compliacne to carb control diet. Pt with no questions at this time. Nutrition Risk Level   Risk Level: Low    Nutrition Intervention  Food and/or Delivery: Continue current diet  Nutrition Education/Counseling/Coordination of Care:  Continued Inpatient Monitoring, Education declined    Patient assessed for nutrition risk. Deemed to be at low risk at this time. Will continue to follow patient.       Electronically signed by Vanessa Zavaleta RD, LD on 9/4/18 at 2:18 PM    Contact Number: [phone]

## 2018-09-04 NOTE — CONSULTS
Pharmacy to Dose Vancomycin    Dx: HCAP  Goal trough = 15-20 mcg/mL  Pt wt =  71.7 kg  Estimated Creatinine Clearance: 13 mL/min (A) (based on SCr of 3.3 mg/dL (H)). Vancomycin 1000 mg IVPB x 1 at 1815 today. Vancomycin level in the am        9/4  HD pt. Level =15.9mcg ~10hr post 1gm dose. HD on M,W,F  Pt.doesn't want additional HD today if recommended by MD  Would like to discuss with MD before making further recommendations. Negra Farris/Ivett. 9/4/18 9:22 AM    9/5  Not sure why vanc lab was canceled by night shift RN. Will follow up with day shift RN    -  Night shift RN d/c'd placeholder as well  Will check with MD    -Continue vanc per Dr. Destiny Cade level = 15mcg ~9am,  Recommended 500mg X1 post HD today  Level on 9/7 before HD.   Negra Cardozo. 9/5/18 11:18 AM
158 lb (71.7 kg)   SpO2 96%   BMI 27.99 kg/m²   Gen: alert, awake, ill-appearing  Skin: no rash, turgor wnl  Heent:  eomi, mmm  Neck: no bruits or jvd noted, thyroid normal  Cardiovascular:  S1, S2 irregular without m/r/g  Respiratory: CTA B without w/r/r; respiratory effort normal  Abdomen:  +bs, soft, nt, nd, no hepatosplenomegaly  Ext: no lower extremity edema  Psychiatric: mood and affect appropriate; judgement and insight intact  Musculoskeletal:  Rom, muscular strength intact; digits, nails normal    Data/  CBC:   Lab Results   Component Value Date    WBC 6.8 09/04/2018    RBC 2.85 09/04/2018    HGB 9.0 09/04/2018    HCT 27.6 09/04/2018    MCV 96.6 09/04/2018    MCH 31.4 09/04/2018    MCHC 32.5 09/04/2018    RDW 15.5 09/04/2018    PLT 86 09/04/2018    MPV 9.3 09/04/2018     BMP:    Lab Results   Component Value Date     09/04/2018    K 4.7 09/04/2018    CL 96 09/04/2018    CO2 29 09/04/2018    BUN 25 09/04/2018    LABALBU 3.0 09/04/2018    CREATININE 4.7 09/04/2018    CALCIUM 8.4 09/04/2018    GFRAA 11 09/04/2018    LABGLOM 9 09/04/2018    GLUCOSE 81 09/04/2018         Assessment/    - End stage renal disease - HD MWF  - Pulmonary edema/HCAP/Recent CNS bacteremia  - Coagulopathy  - Anemia of chronic disease - Hb below target    Plan/    - Plan for HD tomorrow with UF as tolerated  - Dose MAKAYLA with HD  - Trend labs, follow-up on cultures      Thank you for the consultation. Please do not hesitate to call with questions.     [employer]

## 2018-09-04 NOTE — PLAN OF CARE
Problem: Falls - Risk of:  Goal: Will remain free from falls  Will remain free from falls   Outcome: Ongoing  Bed in lowest possible position, fall risk band and socks in place on pt. Call light within reach. No falls as of time of assessment.

## 2018-09-04 NOTE — PROGRESS NOTES
Physical Therapy & Occupational Therapy  Facility/Department: [facility] B3 - MED SURG  Attempt  NAME: Geni Velez  : 1940  MRN: [id number]    Second attempt to initiate OT & PT evaluation with patient this date. Pt presented supine in bed with lights off in room and continues to decline therapy evaluation despite encouragement and education of its purpose, stating that she is comfortable, trying to catch up on sleep, and does not want to move now.     Will re-attempt tomorrow     Michelle Winn, PT   Tom Hernández, OT

## 2018-09-05 VITALS
HEIGHT: 63 IN | DIASTOLIC BLOOD PRESSURE: 50 MMHG | SYSTOLIC BLOOD PRESSURE: 125 MMHG | WEIGHT: 158.07 LBS | OXYGEN SATURATION: 96 % | BODY MASS INDEX: 28.01 KG/M2 | TEMPERATURE: 98.2 F | HEART RATE: 60 BPM | RESPIRATION RATE: 16 BRPM

## 2018-09-05 LAB
ALBUMIN SERPL-MCNC: 2.8 G/DL (ref 3.4–5)
ANION GAP SERPL CALCULATED.3IONS-SCNC: 13 MMOL/L (ref 3–16)
BASOPHILS ABSOLUTE: 0.1 K/UL (ref 0–0.2)
BASOPHILS RELATIVE PERCENT: 2.5 %
BUN BLDV-MCNC: 33 MG/DL (ref 7–20)
CALCIUM SERPL-MCNC: 8.1 MG/DL (ref 8.3–10.6)
CHLORIDE BLD-SCNC: 96 MMOL/L (ref 99–110)
CO2: 27 MMOL/L (ref 21–32)
CREAT SERPL-MCNC: 5.7 MG/DL (ref 0.6–1.2)
EOSINOPHILS ABSOLUTE: 0.4 K/UL (ref 0–0.6)
EOSINOPHILS RELATIVE PERCENT: 7.3 %
GFR AFRICAN AMERICAN: 9
GFR NON-AFRICAN AMERICAN: 7
GLUCOSE BLD-MCNC: 75 MG/DL (ref 70–99)
GLUCOSE BLD-MCNC: 84 MG/DL (ref 70–99)
GLUCOSE BLD-MCNC: 86 MG/DL (ref 70–99)
HCT VFR BLD CALC: 28.9 % (ref 36–48)
HEMOGLOBIN: 9.4 G/DL (ref 12–16)
INR BLD: 5.35 (ref 0.86–1.14)
LYMPHOCYTES ABSOLUTE: 0.7 K/UL (ref 1–5.1)
LYMPHOCYTES RELATIVE PERCENT: 12.9 %
MCH RBC QN AUTO: 31.3 PG (ref 26–34)
MCHC RBC AUTO-ENTMCNC: 32.5 G/DL (ref 31–36)
MCV RBC AUTO: 96.3 FL (ref 80–100)
MONOCYTES ABSOLUTE: 0.7 K/UL (ref 0–1.3)
MONOCYTES RELATIVE PERCENT: 12.4 %
NEUTROPHILS ABSOLUTE: 3.5 K/UL (ref 1.7–7.7)
NEUTROPHILS RELATIVE PERCENT: 64.9 %
PDW BLD-RTO: 15.4 % (ref 12.4–15.4)
PERFORMED ON: NORMAL
PERFORMED ON: NORMAL
PHOSPHORUS: 3.7 MG/DL (ref 2.5–4.9)
PLATELET # BLD: 112 K/UL (ref 135–450)
PMV BLD AUTO: 8.9 FL (ref 5–10.5)
POTASSIUM SERPL-SCNC: 4.8 MMOL/L (ref 3.5–5.1)
PROTHROMBIN TIME: 61 SEC (ref 9.8–13)
RBC # BLD: 3 M/UL (ref 4–5.2)
SODIUM BLD-SCNC: 136 MMOL/L (ref 136–145)
VANCOMYCIN RANDOM: 15 UG/ML
WBC # BLD: 5.4 K/UL (ref 4–11)

## 2018-09-05 PROCEDURE — 97535 SELF CARE MNGMENT TRAINING: CPT

## 2018-09-05 PROCEDURE — 2580000003 HC RX 258: Performed by: HOSPITALIST

## 2018-09-05 PROCEDURE — 94664 DEMO&/EVAL PT USE INHALER: CPT

## 2018-09-05 PROCEDURE — 94640 AIRWAY INHALATION TREATMENT: CPT

## 2018-09-05 PROCEDURE — 80202 ASSAY OF VANCOMYCIN: CPT

## 2018-09-05 PROCEDURE — 6370000000 HC RX 637 (ALT 250 FOR IP): Performed by: INTERNAL MEDICINE

## 2018-09-05 PROCEDURE — 6360000002 HC RX W HCPCS: Performed by: INTERNAL MEDICINE

## 2018-09-05 PROCEDURE — 90937 HEMODIALYSIS REPEATED EVAL: CPT

## 2018-09-05 PROCEDURE — G8987 SELF CARE CURRENT STATUS: HCPCS

## 2018-09-05 PROCEDURE — G8988 SELF CARE GOAL STATUS: HCPCS

## 2018-09-05 PROCEDURE — 85610 PROTHROMBIN TIME: CPT

## 2018-09-05 PROCEDURE — 80069 RENAL FUNCTION PANEL: CPT

## 2018-09-05 PROCEDURE — G8979 MOBILITY GOAL STATUS: HCPCS

## 2018-09-05 PROCEDURE — 97165 OT EVAL LOW COMPLEX 30 MIN: CPT

## 2018-09-05 PROCEDURE — 5A1D70Z PERFORMANCE OF URINARY FILTRATION, INTERMITTENT, LESS THAN 6 HOURS PER DAY: ICD-10-PCS | Performed by: INTERNAL MEDICINE

## 2018-09-05 PROCEDURE — 2580000003 HC RX 258: Performed by: INTERNAL MEDICINE

## 2018-09-05 PROCEDURE — 36415 COLL VENOUS BLD VENIPUNCTURE: CPT

## 2018-09-05 PROCEDURE — 6370000000 HC RX 637 (ALT 250 FOR IP): Performed by: HOSPITALIST

## 2018-09-05 PROCEDURE — 85025 COMPLETE CBC W/AUTO DIFF WBC: CPT

## 2018-09-05 PROCEDURE — 97161 PT EVAL LOW COMPLEX 20 MIN: CPT

## 2018-09-05 PROCEDURE — G8978 MOBILITY CURRENT STATUS: HCPCS

## 2018-09-05 RX ORDER — CEFUROXIME AXETIL 250 MG/1
250 TABLET ORAL 2 TIMES DAILY
Qty: 6 TABLET | Refills: 0 | Status: SHIPPED | OUTPATIENT
Start: 2018-09-05 | End: 2018-09-08

## 2018-09-05 RX ORDER — LORAZEPAM 0.5 MG/1
0.5 TABLET ORAL
Status: COMPLETED | OUTPATIENT
Start: 2018-09-05 | End: 2018-09-05

## 2018-09-05 RX ADMIN — OMEPRAZOLE 20 MG: 20 CAPSULE, DELAYED RELEASE ORAL at 12:24

## 2018-09-05 RX ADMIN — METOPROLOL SUCCINATE 25 MG: 25 TABLET, EXTENDED RELEASE ORAL at 12:28

## 2018-09-05 RX ADMIN — ATORVASTATIN CALCIUM 80 MG: 80 TABLET, FILM COATED ORAL at 12:25

## 2018-09-05 RX ADMIN — NEPHROCAP 1 MG: 1 CAP ORAL at 12:23

## 2018-09-05 RX ADMIN — SEVELAMER CARBONATE 800 MG: 800 TABLET, FILM COATED ORAL at 12:25

## 2018-09-05 RX ADMIN — LORAZEPAM 0.5 MG: 0.5 TABLET ORAL at 09:16

## 2018-09-05 RX ADMIN — CLOPIDOGREL BISULFATE 75 MG: 75 TABLET ORAL at 12:25

## 2018-09-05 RX ADMIN — IPRATROPIUM BROMIDE AND ALBUTEROL SULFATE 1 AMPULE: .5; 3 SOLUTION RESPIRATORY (INHALATION) at 08:44

## 2018-09-05 RX ADMIN — LOSARTAN POTASSIUM 25 MG: 25 TABLET, FILM COATED ORAL at 12:26

## 2018-09-05 RX ADMIN — CINACALCET HYDROCHLORIDE 30 MG: 30 TABLET, COATED ORAL at 12:29

## 2018-09-05 RX ADMIN — FLECAINIDE ACETATE 50 MG: 100 TABLET ORAL at 12:27

## 2018-09-05 RX ADMIN — VANCOMYCIN HYDROCHLORIDE 500 MG: 500 INJECTION, POWDER, LYOPHILIZED, FOR SOLUTION INTRAVENOUS at 14:06

## 2018-09-05 RX ADMIN — SODIUM CHLORIDE, PRESERVATIVE FREE 10 ML: 5 INJECTION INTRAVENOUS at 12:31

## 2018-09-05 RX ADMIN — DARBEPOETIN ALFA 25 MCG: 25 INJECTION, SOLUTION INTRAVENOUS; SUBCUTANEOUS at 11:07

## 2018-09-05 ASSESSMENT — PAIN SCALES - GENERAL
PAINLEVEL_OUTOF10: 0

## 2018-09-05 NOTE — PROGRESS NOTES
Physical/Occupational Therapy  Facility/Department: [facility] B3 - MED SURG  Attempt  NAME: Emma Do  : 1940  MRN: [id number]    Attempted to see patient for PT/OT evaluation. Pt off floor, presumably for HD. Will re-attempt this afternoon.     Svetlana File,  [address], OTR/L #[id number]

## 2018-09-05 NOTE — FLOWSHEET NOTE
09/05/18 0810 09/05/18 1135   Vital Signs   BP (!) 121/53 (!) 125/52   Temp 98.2 °F (36.8 °C) 97.6 °F (36.4 °C)   Weight 163 lb 9.3 oz (74.2 kg) 158 lb 1.1 oz (71.7 kg)   Weight Method Bed scale Bed scale   Percent Weight Change 3.53 -3.37   Dry Weight 156 lb 8.4 oz (71 kg) 156 lb 8.4 oz (71 kg)   Pain Assessment   Pain Assessment 0-10 0-10   Pain Level 0 0   Post-Hemodialysis Assessment   Post-Treatment Procedures --  Blood returned; Access bleeding time < 10 minutes   Machine Disinfection Process --  Exterior Machine Disinfection   Rinseback Volume (ml) --  400 ml   Total Liters Processed (l/min) --  58.5 l/min   Dialyzer Clearance --  Moderately streaked   Duration of Treatment (minutes) --  180 minutes   Heparin amount administered during treatment (units) --  0 units   Hemodialysis Intake (ml) --  400 ml   Hemodialysis Output (ml) --  2800 ml   NET Removed (ml) --  2400 ml   Tolerated Treatment --  Fair   Patient Response to Treatment --  pt had anxiety statrt of tx. anti-anxiety medication, O2 and breathing tx during treatment   Bilateral Breath Sounds Clear Clear   Edema Left upper extremity Left upper extremity   Treatment time: 3 hours  Net UF: 2500 ml     Pre weight: 74.2 kg   Post weight: 71.7 kg  EDW: 71 kg     Access used: LAVF  Access function: good with  ml/min     Medications or blood products given: ativan     Regular outpatient schedule: MWF     Summary of response to treatment: pt c/o sob start of treatment. O2 @2l applied. VSS. RT notified to give breathing treatment. MD notified and anti-anxiety ordered. pt responded well to interventions. Removed 2.5L. Copy of dialysis treatment record placed in chart, to be scanned into EMR.

## 2018-09-05 NOTE — DISCHARGE SUMMARY
instead of 121), worsening INR elevation (6.86), plan to admit for further workup, mgmt. \"        HCAP  - vanc and cefepime started 9/3, stopped vanc 9/5 when MRSA probe negative. Discharge with another 3 days of cefuroxime.  - f/u blood cultures     PAfib, also tachy-nanda syndrome  - had been on warfarin, not give here, INR drifted down from 6 to 5. Recheck again at [facility] in a couple days and probably restart warfarin, probably at a lower dose.    - had pacer placed 8/27     DM2  - stopped insulin regimen based upon requirements here.      ESRD on HD MWF via LUE AVF  - nephrology consulted. CXR suggested she needed fluid removal, this may have been the primary cause of her dyspnea. - anuric, DC'd furosemide.      Thrombocytopenia  - likely due to acute illness. Monitor.       CAD, s/p PCI's of RCA and PDA on 8/23  - continue clopidogrel. Not on DAPT since she is also on warfarin. Continue statin, metoprolol, amlodipine.     HTN  - amlodipine, hydralazine, losartan, metoprolol          Physical Exam Performed:     BP (!) 125/50   Pulse 60   Temp 98.2 °F (36.8 °C) (Oral)   Resp 16   Ht 5' 3\" (1.6 m)   Wt 158 lb 1.1 oz (71.7 kg)   SpO2 96%   BMI 28.00 kg/m²       General appearance: No apparent distress, appears stated age and cooperative. HEENT: Pupils equal, round, and reactive to light. Conjunctivae/corneas clear. Neck: Supple, with full range of motion. No jugular venous distention. Trachea midline. Respiratory:  Normal respiratory effort. Clear to auscultation, bilaterally without Rales/Wheezes/Rhonchi. Cardiovascular: Regular rate and rhythm with normal S1/S2 without murmurs, rubs or gallops. Abdomen: Soft, non-tender, non-distended with normal bowel sounds. Musculoskeletal: No clubbing, cyanosis or edema bilaterally. Full range of motion without deformity.   Skin: Skin color, texture, turgor normal.  R chest site with PPM, small hematoma appearance, ecchymosis into axilla, breast

## 2018-09-05 NOTE — PROGRESS NOTES
Good  Decision Making: Low Complexity  REQUIRES OT FOLLOW UP: Yes  Activity Tolerance  Activity Tolerance: Patient Tolerated treatment well  Safety Devices  Safety Devices in place: Yes  Type of devices: Call light within reach; Chair alarm in place; Left in chair;Gait belt;Nurse notified         Plan   Plan  Times per week: 3-5    G-Code  OT G-codes  Functional Assessment Tool Used: AM-PAC  Score: 18  Functional Limitation: Self care  Self Care Current Status (): At least 40 percent but less than 60 percent impaired, limited or restricted  Self Care Goal Status (): At least 20 percent but less than 40 percent impaired, limited or restricted    AM-PAC Score   AM-PAC Inpatient Daily Activity Raw Score: 18  AM-PAC Inpatient ADL T-Scale Score : 38.66  ADL Inpatient CMS 0-100% Score: 46.65  ADL Inpatient CMS G-Code Modifier : CK    Goals  Short term goals  Time Frame for Short term goals: 1 week unless otherwise specified  Short term goal 1: Don/doff pants/underwear with SBA by 9/10/18  Short term goal 2: Tolerate 8 mins of standing ADL with SBA  Short term goal 3: Adhere to pacemaker precautions 100% of time during session without VCs/assist  Short term goal 4: Toilet t/f and hygiene with SBA  Short term goal 5: Bed mobility with I  Patient Goals   Patient goals : \"I want to get out of the hospital and get better. \"       Therapy Time   Individual Concurrent Group Co-treatment   Time In 1324         Time Out 1344         Minutes 20         Timed Code Treatment Minutes: 10 Minutes     If patient is discharged prior to next treatment session, this note will serve as the discharge summary.   Danielle Green, OTR/L #[id number]

## 2018-09-05 NOTE — PROGRESS NOTES
Pt d/c'd to [facility] with transport. IV access removed with no complications. Notified [facility] and removed tele box. Reviewed d/c instructions, home meds, and f/u information utilizing teach-back method. Scripts for ATB given to patient. Filled by inpatient pharmacy. Patient verbalized understanding.

## 2018-09-05 NOTE — CARE COORDINATION
CASE MANAGEMENT DISCHARGE SUMMARY      Discharge to: [facility] completed: yes  Hospital Exemption Notification (HENS) completed: already done    Transportation: [address] up time: 6 pm   Ambulance form completed: Yes    Notified: patient aware of discharge plan and  time   Family: patient states she will contact family   Facility/Agency: [facility] faxed to [phone]   RN: Rosaura Sanchez RN and Jaylyn Coleman CM at bedside for discharge time out. Patient states no questions or concerns. Phone number for report to facility: Michell Sepulveda aware of discharge and  time    Note: Discharging nurse to complete [facility], reconcile AVS, and place final copy with patient's discharge packet. RN to ensure that written prescriptions for  Level II medications are sent with patient to the facility as per protocol.

## 2018-09-05 NOTE — PROGRESS NOTES
[facility]       Progress Note           Subjective/   [de-identified]y.o. year old female who we are seeing in consultation for ESRD. HPI:  Seen on dialysis. Orders confirmed. Pre-weight at HD today 74.2 kg. Still with sob, on oxygen.     Objective/   GEN:  Chronically ill, BP (!) 120/55   Pulse 59   Temp 98.1 °F (36.7 °C) (Oral)   Resp 14   Ht 5' 3\" (1.6 m)   Wt 158 lb (71.7 kg)   SpO2 94%   BMI 27.99 kg/m²   CV: S1, S2 sudha; no edema  RESP: CTA B without w/r/r; breathing wnl  ABD: +bs, soft, nt, no hsm  SKIN: warm, no rashes    Data/  Recent Labs      09/03/18   1242  09/04/18   0638  09/05/18   0621   WBC  12.0*  6.8  5.4   HGB  9.8*  9.0*  9.4*   HCT  30.5*  27.6*  28.9*   MCV  96.3  96.6  96.3   PLT  71*  86*  112*     Recent Labs      09/03/18   1242  09/04/18   0638  09/05/18   0621   NA  141  137  136   K  3.8  4.7  4.8   CL  99  96*  96*   CO2  27  29  27   GLUCOSE  77  81  86   PHOS   --    --   3.7   BUN  19  25*  33*   CREATININE  3.3*  4.7*  5.7*   LABGLOM  13*  9*  7*   GFRAA  16*  11*  9*       Assessment/     - End stage renal disease - HD MWF  - Pulmonary edema/HCAP/Recent CNS bacteremia  - Coagulopathy  - Anemia of chronic disease - Hb below target  - PAF s/p PPM Aug 2018    Plan/     - HD today with 2-3 L UF target, EDW 71 kg  - Dose MAKAYLA with HD today

## 2018-09-06 LAB
EKG ATRIAL RATE: 60 BPM
EKG DIAGNOSIS: NORMAL
EKG Q-T INTERVAL: 454 MS
EKG QRS DURATION: 104 MS
EKG QTC CALCULATION (BAZETT): 454 MS
EKG R AXIS: -18 DEGREES
EKG T AXIS: 56 DEGREES
EKG VENTRICULAR RATE: 60 BPM

## 2018-09-08 LAB
BLOOD CULTURE, ROUTINE: NORMAL
CULTURE, BLOOD 2: NORMAL

## 2018-09-13 ENCOUNTER — TELEPHONE (OUTPATIENT)
Dept: CARDIOLOGY CLINIC | Age: 78
End: 2018-09-13

## 2018-09-14 NOTE — TELEPHONE ENCOUNTER
The pt was a no show for her hsfu on 9-6-18 with NPDD. Does pt need to see RPS or NPDD at this time?

## 2018-09-18 ENCOUNTER — PROCEDURE VISIT (OUTPATIENT)
Dept: CARDIOLOGY CLINIC | Age: 78
End: 2018-09-18

## 2018-09-18 DIAGNOSIS — Z95.0 PACEMAKER: Primary | ICD-10-CM

## 2018-09-18 DIAGNOSIS — I49.5 SICK SINUS SYNDROME (HCC): ICD-10-CM

## 2018-09-20 ENCOUNTER — APPOINTMENT (OUTPATIENT)
Dept: GENERAL RADIOLOGY | Age: 78
End: 2018-09-20
Payer: MEDICARE

## 2018-09-20 ENCOUNTER — APPOINTMENT (OUTPATIENT)
Dept: CT IMAGING | Age: 78
End: 2018-09-20
Payer: MEDICARE

## 2018-09-20 ENCOUNTER — HOSPITAL ENCOUNTER (EMERGENCY)
Age: 78
Discharge: HOME | End: 2018-09-20
Attending: EMERGENCY MEDICINE | Admitting: EMERGENCY MEDICINE
Payer: MEDICARE

## 2018-09-20 VITALS
TEMPERATURE: 98 F | DIASTOLIC BLOOD PRESSURE: 60 MMHG | SYSTOLIC BLOOD PRESSURE: 130 MMHG | WEIGHT: 154 LBS | HEART RATE: 60 BPM | OXYGEN SATURATION: 96 % | BODY MASS INDEX: 27.28 KG/M2 | RESPIRATION RATE: 114 BRPM

## 2018-09-20 DIAGNOSIS — R60.0 PEDAL EDEMA: ICD-10-CM

## 2018-09-20 DIAGNOSIS — J18.9 PNEUMONIA DUE TO ORGANISM: Primary | ICD-10-CM

## 2018-09-20 LAB
A/G RATIO: 1.3 (ref 1.1–2.2)
ALBUMIN SERPL-MCNC: 3.3 G/DL (ref 3.4–5)
ALP BLD-CCNC: 113 U/L (ref 40–129)
ALT SERPL-CCNC: 11 U/L (ref 10–40)
ANION GAP SERPL CALCULATED.3IONS-SCNC: 11 MMOL/L (ref 3–16)
AST SERPL-CCNC: 49 U/L (ref 15–37)
BASOPHILS ABSOLUTE: 0 K/UL (ref 0–0.2)
BASOPHILS RELATIVE PERCENT: 0.8 %
BILIRUB SERPL-MCNC: 0.6 MG/DL (ref 0–1)
BUN BLDV-MCNC: 13 MG/DL (ref 7–20)
CALCIUM SERPL-MCNC: 8.3 MG/DL (ref 8.3–10.6)
CHLORIDE BLD-SCNC: 100 MMOL/L (ref 99–110)
CO2: 28 MMOL/L (ref 21–32)
CREAT SERPL-MCNC: 3.3 MG/DL (ref 0.6–1.2)
EOSINOPHILS ABSOLUTE: 0.1 K/UL (ref 0–0.6)
EOSINOPHILS RELATIVE PERCENT: 2.2 %
GFR AFRICAN AMERICAN: 16
GFR NON-AFRICAN AMERICAN: 13
GLOBULIN: 2.6 G/DL
GLUCOSE BLD-MCNC: 93 MG/DL (ref 70–99)
HCT VFR BLD CALC: 31.7 % (ref 36–48)
HEMOGLOBIN: 10.2 G/DL (ref 12–16)
LYMPHOCYTES ABSOLUTE: 0.3 K/UL (ref 1–5.1)
LYMPHOCYTES RELATIVE PERCENT: 5.7 %
MCH RBC QN AUTO: 30.9 PG (ref 26–34)
MCHC RBC AUTO-ENTMCNC: 32.3 G/DL (ref 31–36)
MCV RBC AUTO: 95.7 FL (ref 80–100)
MONOCYTES ABSOLUTE: 0.5 K/UL (ref 0–1.3)
MONOCYTES RELATIVE PERCENT: 10.8 %
NEUTROPHILS ABSOLUTE: 3.8 K/UL (ref 1.7–7.7)
NEUTROPHILS RELATIVE PERCENT: 80.5 %
PDW BLD-RTO: 15.8 % (ref 12.4–15.4)
PLATELET # BLD: 121 K/UL (ref 135–450)
PMV BLD AUTO: 8.3 FL (ref 5–10.5)
POTASSIUM SERPL-SCNC: 5.7 MMOL/L (ref 3.5–5.1)
PRO-BNP: ABNORMAL PG/ML (ref 0–449)
RBC # BLD: 3.32 M/UL (ref 4–5.2)
SODIUM BLD-SCNC: 139 MMOL/L (ref 136–145)
SPECIMEN STATUS: NORMAL
TOTAL PROTEIN: 5.9 G/DL (ref 6.4–8.2)
WBC # BLD: 4.8 K/UL (ref 4–11)

## 2018-09-20 PROCEDURE — 83880 ASSAY OF NATRIURETIC PEPTIDE: CPT

## 2018-09-20 PROCEDURE — 85025 COMPLETE CBC W/AUTO DIFF WBC: CPT

## 2018-09-20 PROCEDURE — 80053 COMPREHEN METABOLIC PANEL: CPT

## 2018-09-20 PROCEDURE — 71250 CT THORAX DX C-: CPT

## 2018-09-20 PROCEDURE — 71046 X-RAY EXAM CHEST 2 VIEWS: CPT

## 2018-09-20 PROCEDURE — 6370000000 HC RX 637 (ALT 250 FOR IP): Performed by: NURSE PRACTITIONER

## 2018-09-20 PROCEDURE — 93971 EXTREMITY STUDY: CPT

## 2018-09-20 PROCEDURE — 99284 EMERGENCY DEPT VISIT MOD MDM: CPT

## 2018-09-20 RX ORDER — DOXYCYCLINE HYCLATE 100 MG
100 TABLET ORAL ONCE
Status: COMPLETED | OUTPATIENT
Start: 2018-09-20 | End: 2018-09-20

## 2018-09-20 RX ORDER — DOXYCYCLINE 100 MG/1
100 TABLET ORAL 2 TIMES DAILY
Qty: 20 TABLET | Refills: 0 | Status: SHIPPED | OUTPATIENT
Start: 2018-09-20 | End: 2018-09-30

## 2018-09-20 RX ADMIN — DOXYCYCLINE HYCLATE 100 MG: 100 TABLET, COATED ORAL at 16:07

## 2018-09-21 NOTE — ED PROVIDER NOTES
PACEMAKER INSERTION  08/27/2018    Dual Chamber Pacemaker Insertion    PTCA  08/15/2018    SHYANNE - 2.75 x 16 to pPDA, 3.5 x 28 to dRCA and 3.5 x 20 mRCA    PTCA  08/20/2018    SHYANNE- 3.5 x 16 to mCx and 3.5 x 16 to pLAD    THYROID SURGERY           CURRENT MEDICATIONS:       Discharge Medication List as of 9/20/2018  3:49 PM      CONTINUE these medications which have NOT CHANGED    Details   metoprolol succinate (TOPROL XL) 25 MG extended release tablet Take 1 tablet by mouth daily, Disp-30 tablet, R-0Print      aspirin 81 MG EC tablet Take 1 tablet by mouth daily for 10 days, Disp-10 tablet, R-0Normal      clopidogrel (PLAVIX) 75 MG tablet Take 1 tablet by mouth daily, Disp-30 tablet, R-3Normal      atorvastatin (LIPITOR) 80 MG tablet Take 1 tablet by mouth daily, Disp-30 tablet, R-3Normal      losartan (COZAAR) 25 MG tablet Take 1 tablet by mouth daily, Disp-30 tablet, R-3Normal      cinacalcet (SENSIPAR) 30 MG tablet Take 30 mg by mouth dailyHistorical Med      hydrALAZINE (APRESOLINE) 25 MG tablet Take 50 mg by mouth 3 times daily 50 in am, 25 in afternoon and 50 in pm      sevelamer (RENVELA) 800 MG tablet Take 1 tablet by mouth 2 times daily (with meals)      flecainide (TAMBOCOR) 50 MG tablet Take 50 mg by mouth 2 times daily      B Complex-C-Folic Acid (FLAVIA CAPS PO) Take  by mouth daily. amlodipine (NORVASC) 10 MG tablet Take 10 mg by mouth nightly       Omeprazole (PRILOSEC PO) Take 20 mg by mouth daily                ALLERGIES:    Patient has no known allergies. FAMILY HISTORY:     History reviewed. No pertinent family history. SOCIAL HISTORY:       Social History     Social History    Marital status:       Spouse name: N/A    Number of children: N/A    Years of education: N/A     Social History Main Topics    Smoking status: Never Smoker    Smokeless tobacco: Never Used    Alcohol use No      Comment: rarely    Drug use: No    Sexual activity: No     Other Topics Concern    NIA Harmon and I have discussed the diagnosis and risks, and we agree with discharging home to follow-up with their primary doctor. We also discussed returning to the Emergency Department immediately if new or worsening symptoms occur. We have discussed the symptoms which are most concerning (e.g., bloody sputum, fever, worsening pain or shortness of breath, vomiting) that necessitate immediate return. FINAL IMPRESSION:      1. Pneumonia due to organism    2.  Pedal edema          DISPOSITION/PLAN:   DISPOSITION Decision To Discharge      PATIENT REFERRED TO:  Eitan Beckett  [address] [place] [address]  [phone]    Call   For follow up      DISCHARGE MEDICATIONS:  Discharge Medication List as of 9/20/2018  3:49 PM      START taking these medications    Details   doxycycline monohydrate (ADOXA) 100 MG tablet Take 1 tablet by mouth 2 times daily for 10 days, Disp-20 tablet, R-0Print                        (Please note that portions of this note were completed with a voice recognition program.  Efforts were made to edit the dictations, but occasionally words are mis-transcribed.)    SO Mccarthy CNP-C (electronically signed)        SO Mccarthy CNP  09/20/18 2706

## 2018-11-24 ENCOUNTER — APPOINTMENT (OUTPATIENT)
Dept: GENERAL RADIOLOGY | Age: 78
DRG: 555 | End: 2018-11-24
Payer: MEDICARE

## 2018-11-24 ENCOUNTER — APPOINTMENT (OUTPATIENT)
Dept: CT IMAGING | Age: 78
DRG: 555 | End: 2018-11-24
Payer: MEDICARE

## 2018-11-24 ENCOUNTER — HOSPITAL ENCOUNTER (INPATIENT)
Age: 78
LOS: 3 days | Discharge: NURSING, REHAB OR LONG-TERM CARE | DRG: 555 | End: 2018-11-27
Attending: EMERGENCY MEDICINE | Admitting: INTERNAL MEDICINE
Payer: MEDICARE

## 2018-11-24 DIAGNOSIS — M25.551 BILATERAL HIP PAIN: Primary | ICD-10-CM

## 2018-11-24 DIAGNOSIS — R53.1 GENERAL WEAKNESS: ICD-10-CM

## 2018-11-24 DIAGNOSIS — M25.552 PAIN OF LEFT HIP JOINT: ICD-10-CM

## 2018-11-24 DIAGNOSIS — M25.552 BILATERAL HIP PAIN: Primary | ICD-10-CM

## 2018-11-24 PROBLEM — M25.559 HIP PAIN: Status: ACTIVE | Noted: 2018-11-24

## 2018-11-24 LAB
A/G RATIO: 1 (ref 1.1–2.2)
ALBUMIN SERPL-MCNC: 2.8 G/DL (ref 3.4–5)
ALP BLD-CCNC: 111 U/L (ref 40–129)
ALT SERPL-CCNC: 16 U/L (ref 10–40)
ANION GAP SERPL CALCULATED.3IONS-SCNC: 13 MMOL/L (ref 3–16)
AST SERPL-CCNC: 33 U/L (ref 15–37)
BASOPHILS ABSOLUTE: 0.1 K/UL (ref 0–0.2)
BASOPHILS RELATIVE PERCENT: 0.8 %
BILIRUB SERPL-MCNC: 0.6 MG/DL (ref 0–1)
BUN BLDV-MCNC: 23 MG/DL (ref 7–20)
CALCIUM SERPL-MCNC: 8.5 MG/DL (ref 8.3–10.6)
CHLORIDE BLD-SCNC: 100 MMOL/L (ref 99–110)
CO2: 26 MMOL/L (ref 21–32)
CREAT SERPL-MCNC: 3.7 MG/DL (ref 0.6–1.2)
EOSINOPHILS ABSOLUTE: 0 K/UL (ref 0–0.6)
EOSINOPHILS RELATIVE PERCENT: 0.1 %
GFR AFRICAN AMERICAN: 14
GFR NON-AFRICAN AMERICAN: 12
GLOBULIN: 2.7 G/DL
GLUCOSE BLD-MCNC: 104 MG/DL (ref 70–99)
HCT VFR BLD CALC: 32.4 % (ref 36–48)
HEMOGLOBIN: 10.6 G/DL (ref 12–16)
INR BLD: 2.05 (ref 0.86–1.14)
LYMPHOCYTES ABSOLUTE: 0.4 K/UL (ref 1–5.1)
LYMPHOCYTES RELATIVE PERCENT: 4.2 %
MCH RBC QN AUTO: 30.5 PG (ref 26–34)
MCHC RBC AUTO-ENTMCNC: 32.7 G/DL (ref 31–36)
MCV RBC AUTO: 93.3 FL (ref 80–100)
MONOCYTES ABSOLUTE: 1.1 K/UL (ref 0–1.3)
MONOCYTES RELATIVE PERCENT: 12 %
NEUTROPHILS ABSOLUTE: 7.9 K/UL (ref 1.7–7.7)
NEUTROPHILS RELATIVE PERCENT: 82.9 %
PDW BLD-RTO: 16.6 % (ref 12.4–15.4)
PLATELET # BLD: 167 K/UL (ref 135–450)
PMV BLD AUTO: 8.5 FL (ref 5–10.5)
POTASSIUM SERPL-SCNC: 4.5 MMOL/L (ref 3.5–5.1)
PROTHROMBIN TIME: 23.4 SEC (ref 9.8–13)
RBC # BLD: 3.48 M/UL (ref 4–5.2)
SODIUM BLD-SCNC: 139 MMOL/L (ref 136–145)
TOTAL PROTEIN: 5.5 G/DL (ref 6.4–8.2)
WBC # BLD: 9.5 K/UL (ref 4–11)

## 2018-11-24 PROCEDURE — 6360000002 HC RX W HCPCS

## 2018-11-24 PROCEDURE — 2700000000 HC OXYGEN THERAPY PER DAY

## 2018-11-24 PROCEDURE — 93005 ELECTROCARDIOGRAM TRACING: CPT | Performed by: EMERGENCY MEDICINE

## 2018-11-24 PROCEDURE — 2580000003 HC RX 258: Performed by: INTERNAL MEDICINE

## 2018-11-24 PROCEDURE — 71045 X-RAY EXAM CHEST 1 VIEW: CPT

## 2018-11-24 PROCEDURE — 96374 THER/PROPH/DIAG INJ IV PUSH: CPT

## 2018-11-24 PROCEDURE — 72131 CT LUMBAR SPINE W/O DYE: CPT

## 2018-11-24 PROCEDURE — 85025 COMPLETE CBC W/AUTO DIFF WBC: CPT

## 2018-11-24 PROCEDURE — 85610 PROTHROMBIN TIME: CPT

## 2018-11-24 PROCEDURE — 1200000000 HC SEMI PRIVATE

## 2018-11-24 PROCEDURE — 73700 CT LOWER EXTREMITY W/O DYE: CPT

## 2018-11-24 PROCEDURE — 96375 TX/PRO/DX INJ NEW DRUG ADDON: CPT

## 2018-11-24 PROCEDURE — 6370000000 HC RX 637 (ALT 250 FOR IP): Performed by: INTERNAL MEDICINE

## 2018-11-24 PROCEDURE — 80053 COMPREHEN METABOLIC PANEL: CPT

## 2018-11-24 PROCEDURE — 99285 EMERGENCY DEPT VISIT HI MDM: CPT

## 2018-11-24 PROCEDURE — 94761 N-INVAS EAR/PLS OXIMETRY MLT: CPT

## 2018-11-24 PROCEDURE — 73521 X-RAY EXAM HIPS BI 2 VIEWS: CPT

## 2018-11-24 RX ORDER — LOSARTAN POTASSIUM 25 MG/1
25 TABLET ORAL DAILY
Status: DISCONTINUED | OUTPATIENT
Start: 2018-11-24 | End: 2018-11-27 | Stop reason: HOSPADM

## 2018-11-24 RX ORDER — SEVELAMER CARBONATE 800 MG/1
800 TABLET, FILM COATED ORAL 2 TIMES DAILY WITH MEALS
Status: DISCONTINUED | OUTPATIENT
Start: 2018-11-25 | End: 2018-11-27 | Stop reason: HOSPADM

## 2018-11-24 RX ORDER — HYDRALAZINE HYDROCHLORIDE 25 MG/1
50 TABLET, FILM COATED ORAL 3 TIMES DAILY
Status: DISCONTINUED | OUTPATIENT
Start: 2018-11-24 | End: 2018-11-27 | Stop reason: HOSPADM

## 2018-11-24 RX ORDER — MORPHINE SULFATE 4 MG/ML
INJECTION, SOLUTION INTRAMUSCULAR; INTRAVENOUS
Status: COMPLETED
Start: 2018-11-24 | End: 2018-11-24

## 2018-11-24 RX ORDER — ONDANSETRON 2 MG/ML
4 INJECTION INTRAMUSCULAR; INTRAVENOUS EVERY 6 HOURS PRN
Status: DISCONTINUED | OUTPATIENT
Start: 2018-11-24 | End: 2018-11-27 | Stop reason: HOSPADM

## 2018-11-24 RX ORDER — CINACALCET 30 MG/1
30 TABLET, FILM COATED ORAL DAILY
Status: DISCONTINUED | OUTPATIENT
Start: 2018-11-25 | End: 2018-11-27 | Stop reason: HOSPADM

## 2018-11-24 RX ORDER — ONDANSETRON 2 MG/ML
4 INJECTION INTRAMUSCULAR; INTRAVENOUS ONCE
Status: COMPLETED | OUTPATIENT
Start: 2018-11-24 | End: 2018-11-24

## 2018-11-24 RX ORDER — SODIUM CHLORIDE 9 MG/ML
INJECTION, SOLUTION INTRAVENOUS CONTINUOUS
Status: DISCONTINUED | OUTPATIENT
Start: 2018-11-24 | End: 2018-11-25

## 2018-11-24 RX ORDER — FLECAINIDE ACETATE 100 MG/1
50 TABLET ORAL 2 TIMES DAILY
Status: DISCONTINUED | OUTPATIENT
Start: 2018-11-24 | End: 2018-11-25

## 2018-11-24 RX ORDER — CLOPIDOGREL BISULFATE 75 MG/1
75 TABLET ORAL DAILY
Status: DISCONTINUED | OUTPATIENT
Start: 2018-11-25 | End: 2018-11-27 | Stop reason: HOSPADM

## 2018-11-24 RX ORDER — OXYCODONE HYDROCHLORIDE 5 MG/1
5 TABLET ORAL EVERY 4 HOURS PRN
Status: DISCONTINUED | OUTPATIENT
Start: 2018-11-24 | End: 2018-11-27 | Stop reason: HOSPADM

## 2018-11-24 RX ORDER — ATORVASTATIN CALCIUM 80 MG/1
80 TABLET, FILM COATED ORAL DAILY
Status: DISCONTINUED | OUTPATIENT
Start: 2018-11-24 | End: 2018-11-27 | Stop reason: HOSPADM

## 2018-11-24 RX ORDER — HEPARIN SODIUM 5000 [USP'U]/ML
5000 INJECTION, SOLUTION INTRAVENOUS; SUBCUTANEOUS EVERY 8 HOURS SCHEDULED
Status: DISCONTINUED | OUTPATIENT
Start: 2018-11-25 | End: 2018-11-25

## 2018-11-24 RX ORDER — ONDANSETRON 2 MG/ML
INJECTION INTRAMUSCULAR; INTRAVENOUS
Status: COMPLETED
Start: 2018-11-24 | End: 2018-11-24

## 2018-11-24 RX ORDER — MORPHINE SULFATE 4 MG/ML
4 INJECTION, SOLUTION INTRAMUSCULAR; INTRAVENOUS ONCE
Status: COMPLETED | OUTPATIENT
Start: 2018-11-24 | End: 2018-11-24

## 2018-11-24 RX ORDER — SODIUM CHLORIDE 0.9 % (FLUSH) 0.9 %
10 SYRINGE (ML) INJECTION EVERY 12 HOURS SCHEDULED
Status: DISCONTINUED | OUTPATIENT
Start: 2018-11-24 | End: 2018-11-27 | Stop reason: HOSPADM

## 2018-11-24 RX ORDER — OXYCODONE HYDROCHLORIDE 5 MG/1
10 TABLET ORAL EVERY 4 HOURS PRN
Status: DISCONTINUED | OUTPATIENT
Start: 2018-11-24 | End: 2018-11-27 | Stop reason: HOSPADM

## 2018-11-24 RX ORDER — SODIUM CHLORIDE 0.9 % (FLUSH) 0.9 %
10 SYRINGE (ML) INJECTION PRN
Status: DISCONTINUED | OUTPATIENT
Start: 2018-11-24 | End: 2018-11-27 | Stop reason: HOSPADM

## 2018-11-24 RX ADMIN — MORPHINE SULFATE 4 MG: 4 INJECTION INTRAVENOUS at 15:46

## 2018-11-24 RX ADMIN — ONDANSETRON 4 MG: 2 INJECTION INTRAMUSCULAR; INTRAVENOUS at 15:46

## 2018-11-24 RX ADMIN — SODIUM CHLORIDE: 9 INJECTION, SOLUTION INTRAVENOUS at 21:40

## 2018-11-24 RX ADMIN — HYDRALAZINE HYDROCHLORIDE 50 MG: 25 TABLET, FILM COATED ORAL at 21:39

## 2018-11-24 RX ADMIN — FLECAINIDE ACETATE 50 MG: 100 TABLET ORAL at 21:39

## 2018-11-24 RX ADMIN — LOSARTAN POTASSIUM 25 MG: 25 TABLET, FILM COATED ORAL at 21:39

## 2018-11-24 RX ADMIN — ATORVASTATIN CALCIUM 80 MG: 80 TABLET, FILM COATED ORAL at 21:40

## 2018-11-24 RX ADMIN — MORPHINE SULFATE 4 MG: 4 INJECTION, SOLUTION INTRAMUSCULAR; INTRAVENOUS at 15:46

## 2018-11-24 RX ADMIN — SODIUM CHLORIDE, PRESERVATIVE FREE 10 ML: 5 INJECTION INTRAVENOUS at 21:40

## 2018-11-24 RX ADMIN — OXYCODONE HYDROCHLORIDE 5 MG: 5 TABLET ORAL at 21:40

## 2018-11-24 ASSESSMENT — PAIN DESCRIPTION - PROGRESSION: CLINICAL_PROGRESSION: GRADUALLY IMPROVING

## 2018-11-24 ASSESSMENT — PAIN DESCRIPTION - LOCATION: LOCATION: LEG;HIP

## 2018-11-24 ASSESSMENT — PAIN DESCRIPTION - ORIENTATION: ORIENTATION: LEFT;RIGHT

## 2018-11-24 ASSESSMENT — PAIN SCALES - GENERAL
PAINLEVEL_OUTOF10: 6
PAINLEVEL_OUTOF10: 8
PAINLEVEL_OUTOF10: 4
PAINLEVEL_OUTOF10: 8

## 2018-11-24 ASSESSMENT — PAIN DESCRIPTION - PAIN TYPE: TYPE: ACUTE PAIN

## 2018-11-24 NOTE — ED NOTES
NP, Merlyn Maki, updated pt and son on plan of care/results.       Virginia Maurer, RN  11/24/18 1195

## 2018-11-24 NOTE — ED PROVIDER NOTES
reformatted images are provided for review. Dose modulation, iterative reconstruction, and/or weight based adjustment of the mA/kV was utilized to reduce the radiation dose to as low as reasonably achievable. COMPARISON: None HISTORY: ORDERING SYSTEM PROVIDED HISTORY: BACK PAIN TECHNOLOGIST PROVIDED HISTORY: Reason for exam:->back pain Ordering Physician Provided Reason for Exam: fell thursday morning-back pain with bilateral leg pain Acuity: Acute Type of Exam: Initial Relevant Medical/Surgical History: no surg FINDINGS: BONES/ALIGNMENT: There is normal alignment of the spine. The vertebral body heights are maintained. No osseous destructive lesion is seen. Severe diffuse osteopenia limits evaluation for nondisplaced fractures. DEGENERATIVE CHANGES: Moderate to severe multilevel degenerative changes. SOFT TISSUES/RETROPERITONEUM: There is a partially imaged moderate left pleural effusion. Trace right pleural effusion is also partially imaged. There are extensive vascular calcifications throughout visualized vascular structures within the paraspinous abdomen. No significant retroperitoneal hemorrhage is identified. The psoas muscles appears symmetric. 1. No acute fracture, or traumatic subluxation within lumbar spine. 2. Moderate partially imaged left pleural effusion. Trace right pleural effusion. 3. Extensive vascular calcifications. Xr Chest Portable    Result Date: 11/25/2018  EXAMINATION: SINGLE XRAY VIEW OF THE CHEST 11/24/2018 5:15 pm COMPARISON: CT chest on September 20, 2018. HISTORY: ORDERING SYSTEM PROVIDED HISTORY: Fall TECHNOLOGIST PROVIDED HISTORY: Reason for exam:->Fall Ordering Physician Provided Reason for Exam: Fall (fell on Thursday; complaining of left hip pain and right leg sore) FINDINGS: Cardiac and mediastinal contours are enlarged but unchanged. Right chest wall pacemaker appears in unchanged position.   Unchanged left pleural effusion and left lung base pulmonary opacity when

## 2018-11-25 LAB
BASOPHILS ABSOLUTE: 0 K/UL (ref 0–0.2)
BASOPHILS RELATIVE PERCENT: 0.4 %
EOSINOPHILS ABSOLUTE: 0 K/UL (ref 0–0.6)
EOSINOPHILS RELATIVE PERCENT: 0.5 %
HCT VFR BLD CALC: 31.7 % (ref 36–48)
HEMOGLOBIN: 10.3 G/DL (ref 12–16)
LYMPHOCYTES ABSOLUTE: 0.4 K/UL (ref 1–5.1)
LYMPHOCYTES RELATIVE PERCENT: 3.5 %
MCH RBC QN AUTO: 30.2 PG (ref 26–34)
MCHC RBC AUTO-ENTMCNC: 32.6 G/DL (ref 31–36)
MCV RBC AUTO: 92.7 FL (ref 80–100)
MONOCYTES ABSOLUTE: 1.3 K/UL (ref 0–1.3)
MONOCYTES RELATIVE PERCENT: 12.9 %
NEUTROPHILS ABSOLUTE: 8.2 K/UL (ref 1.7–7.7)
NEUTROPHILS RELATIVE PERCENT: 82.7 %
PDW BLD-RTO: 16.6 % (ref 12.4–15.4)
PLATELET # BLD: 174 K/UL (ref 135–450)
PMV BLD AUTO: 8.6 FL (ref 5–10.5)
RBC # BLD: 3.42 M/UL (ref 4–5.2)
WBC # BLD: 9.9 K/UL (ref 4–11)

## 2018-11-25 PROCEDURE — 97530 THERAPEUTIC ACTIVITIES: CPT

## 2018-11-25 PROCEDURE — 6370000000 HC RX 637 (ALT 250 FOR IP): Performed by: NURSE PRACTITIONER

## 2018-11-25 PROCEDURE — G8978 MOBILITY CURRENT STATUS: HCPCS

## 2018-11-25 PROCEDURE — 2700000000 HC OXYGEN THERAPY PER DAY

## 2018-11-25 PROCEDURE — G8988 SELF CARE GOAL STATUS: HCPCS

## 2018-11-25 PROCEDURE — 94761 N-INVAS EAR/PLS OXIMETRY MLT: CPT

## 2018-11-25 PROCEDURE — 36415 COLL VENOUS BLD VENIPUNCTURE: CPT

## 2018-11-25 PROCEDURE — 97162 PT EVAL MOD COMPLEX 30 MIN: CPT

## 2018-11-25 PROCEDURE — G8987 SELF CARE CURRENT STATUS: HCPCS

## 2018-11-25 PROCEDURE — 1200000000 HC SEMI PRIVATE

## 2018-11-25 PROCEDURE — 85025 COMPLETE CBC W/AUTO DIFF WBC: CPT

## 2018-11-25 PROCEDURE — 6370000000 HC RX 637 (ALT 250 FOR IP): Performed by: INTERNAL MEDICINE

## 2018-11-25 PROCEDURE — 93010 ELECTROCARDIOGRAM REPORT: CPT | Performed by: INTERNAL MEDICINE

## 2018-11-25 PROCEDURE — 6360000002 HC RX W HCPCS: Performed by: INTERNAL MEDICINE

## 2018-11-25 PROCEDURE — 97167 OT EVAL HIGH COMPLEX 60 MIN: CPT

## 2018-11-25 PROCEDURE — G8979 MOBILITY GOAL STATUS: HCPCS

## 2018-11-25 PROCEDURE — 2580000003 HC RX 258: Performed by: INTERNAL MEDICINE

## 2018-11-25 PROCEDURE — 6360000002 HC RX W HCPCS: Performed by: NURSE PRACTITIONER

## 2018-11-25 RX ORDER — CYCLOBENZAPRINE HCL 10 MG
5 TABLET ORAL 3 TIMES DAILY PRN
Status: DISCONTINUED | OUTPATIENT
Start: 2018-11-25 | End: 2018-11-27 | Stop reason: HOSPADM

## 2018-11-25 RX ORDER — METOPROLOL SUCCINATE 25 MG/1
25 TABLET, EXTENDED RELEASE ORAL DAILY
Status: DISCONTINUED | OUTPATIENT
Start: 2018-11-26 | End: 2018-11-27 | Stop reason: HOSPADM

## 2018-11-25 RX ORDER — LIDOCAINE 4 G/G
1 PATCH TOPICAL DAILY
Status: DISCONTINUED | OUTPATIENT
Start: 2018-11-25 | End: 2018-11-27 | Stop reason: HOSPADM

## 2018-11-25 RX ORDER — OMEPRAZOLE 20 MG/1
20 CAPSULE, DELAYED RELEASE ORAL DAILY
Status: DISCONTINUED | OUTPATIENT
Start: 2018-11-26 | End: 2018-11-25 | Stop reason: CLARIF

## 2018-11-25 RX ORDER — WARFARIN SODIUM 2 MG/1
2 TABLET ORAL
Status: COMPLETED | OUTPATIENT
Start: 2018-11-25 | End: 2018-11-25

## 2018-11-25 RX ORDER — MORPHINE SULFATE 2 MG/ML
1 INJECTION, SOLUTION INTRAMUSCULAR; INTRAVENOUS ONCE
Status: COMPLETED | OUTPATIENT
Start: 2018-11-25 | End: 2018-11-25

## 2018-11-25 RX ORDER — AMLODIPINE BESYLATE 5 MG/1
10 TABLET ORAL NIGHTLY
Status: DISCONTINUED | OUTPATIENT
Start: 2018-11-25 | End: 2018-11-27 | Stop reason: HOSPADM

## 2018-11-25 RX ORDER — METOPROLOL SUCCINATE 25 MG/1
25 TABLET, EXTENDED RELEASE ORAL DAILY
Status: DISCONTINUED | OUTPATIENT
Start: 2018-11-25 | End: 2018-11-25

## 2018-11-25 RX ORDER — PANTOPRAZOLE SODIUM 20 MG/1
20 TABLET, DELAYED RELEASE ORAL
Status: DISCONTINUED | OUTPATIENT
Start: 2018-11-25 | End: 2018-11-27 | Stop reason: HOSPADM

## 2018-11-25 RX ADMIN — SEVELAMER CARBONATE 800 MG: 800 TABLET, FILM COATED ORAL at 10:51

## 2018-11-25 RX ADMIN — CYCLOBENZAPRINE HYDROCHLORIDE 5 MG: 10 TABLET, FILM COATED ORAL at 10:50

## 2018-11-25 RX ADMIN — HYDRALAZINE HYDROCHLORIDE 50 MG: 25 TABLET, FILM COATED ORAL at 09:03

## 2018-11-25 RX ADMIN — LOSARTAN POTASSIUM 25 MG: 25 TABLET, FILM COATED ORAL at 09:03

## 2018-11-25 RX ADMIN — MORPHINE SULFATE 1 MG: 2 INJECTION, SOLUTION INTRAMUSCULAR; INTRAVENOUS at 01:10

## 2018-11-25 RX ADMIN — SODIUM CHLORIDE, PRESERVATIVE FREE 10 ML: 5 INJECTION INTRAVENOUS at 19:56

## 2018-11-25 RX ADMIN — WARFARIN SODIUM 2 MG: 2 TABLET ORAL at 23:24

## 2018-11-25 RX ADMIN — FLECAINIDE ACETATE 50 MG: 100 TABLET ORAL at 09:04

## 2018-11-25 RX ADMIN — CLOPIDOGREL BISULFATE 75 MG: 75 TABLET ORAL at 09:04

## 2018-11-25 RX ADMIN — OXYCODONE HYDROCHLORIDE 10 MG: 5 TABLET ORAL at 19:55

## 2018-11-25 RX ADMIN — HYDRALAZINE HYDROCHLORIDE 50 MG: 25 TABLET, FILM COATED ORAL at 19:53

## 2018-11-25 RX ADMIN — ATORVASTATIN CALCIUM 80 MG: 80 TABLET, FILM COATED ORAL at 09:03

## 2018-11-25 RX ADMIN — HEPARIN SODIUM 5000 UNITS: 5000 INJECTION INTRAVENOUS; SUBCUTANEOUS at 14:47

## 2018-11-25 RX ADMIN — OXYCODONE HYDROCHLORIDE 10 MG: 5 TABLET ORAL at 09:03

## 2018-11-25 RX ADMIN — SEVELAMER CARBONATE 800 MG: 800 TABLET, FILM COATED ORAL at 17:38

## 2018-11-25 RX ADMIN — AMLODIPINE BESYLATE 10 MG: 5 TABLET ORAL at 22:11

## 2018-11-25 RX ADMIN — CINACALCET HYDROCHLORIDE 30 MG: 30 TABLET, COATED ORAL at 10:52

## 2018-11-25 RX ADMIN — FLECAINIDE ACETATE 50 MG: 100 TABLET ORAL at 19:54

## 2018-11-25 ASSESSMENT — PAIN DESCRIPTION - PAIN TYPE
TYPE: ACUTE PAIN

## 2018-11-25 ASSESSMENT — PAIN DESCRIPTION - LOCATION
LOCATION: HIP;LEG
LOCATION: BUTTOCKS
LOCATION: HIP;LEG
LOCATION: LEG;HIP

## 2018-11-25 ASSESSMENT — PAIN SCALES - GENERAL
PAINLEVEL_OUTOF10: 8
PAINLEVEL_OUTOF10: 8
PAINLEVEL_OUTOF10: 9
PAINLEVEL_OUTOF10: 8

## 2018-11-25 ASSESSMENT — PAIN SCALES - WONG BAKER: WONGBAKER_NUMERICALRESPONSE: 8

## 2018-11-25 ASSESSMENT — PAIN DESCRIPTION - ORIENTATION
ORIENTATION: LEFT

## 2018-11-25 NOTE — PROGRESS NOTES
Rales/Wheezes/Rhonchi. Cardiovascular: Regular rate and rhythm + murmur heard through out chest   Abdomen: Soft, non-tender, non-distended with normal bowel sounds. Musculoskeletal: No clubbing, cyanosis or edema bilaterally. grabs at left hip thigh with minimal movement   Skin: Sallow pale and dry   Neurologic:  Neurovascularly intact without any focal sensory/motor deficits. Cranial nerves: II-XII intact, grossly non-focal.  Psychiatric: - defer   Capillary Refill: Brisk,< 3 seconds   Peripheral Pulses: +2 palpable, equal bilaterally       Labs:   Recent Labs      11/24/18   1720  11/25/18   0521   WBC  9.5  9.9   HGB  10.6*  10.3*   HCT  32.4*  31.7*   PLT  167  174     Recent Labs      11/24/18   1720   NA  139   K  4.5   CL  100   CO2  26   BUN  23*   CREATININE  3.7*   CALCIUM  8.5     Recent Labs      11/24/18   1720   AST  33   ALT  16   BILITOT  0.6   ALKPHOS  111     Recent Labs      11/24/18   1720   INR  2.05*     No results for input(s): CKTOTAL, TROPONINI in the last 72 hours. Urinalysis:      Lab Results   Component Value Date    NITRU Negative 04/12/2015    WBCUA 3-5 04/12/2015    BACTERIA Rare 04/12/2015    RBCUA 20-50 04/12/2015    BLOODU LARGE 04/12/2015    SPECGRAV 1.015 04/12/2015    GLUCOSEU Negative 04/12/2015       Radiology:  CT LUMBAR SPINE WO CONTRAST   Preliminary Result   No acute fracture, or traumatic subluxation. Moderate partially imaged left pleural effusion. Trace right pleural   effusion. Extensive vascular calcifications. CT HIP LEFT WO CONTRAST   Final Result   No acute osseous abnormality identified. XR CHEST PORTABLE   Preliminary Result   Unchanged left pleural effusion and left lung base pulmonary opacity when   compared to the previous chest CT. Right lung remains clear. No new pulmonary opacity. No new pleural   effusion. No evidence of pneumothorax.       No radiographic evidence of displaced fractures within chest. RECOMMENDATION:   Recommend radiographic follow-up to complete resolution. XR HIP BILATERAL W AP PELVIS (2 VIEWS)   Final Result   1. No convincing evidence of acute osseous abnormalities. 2. Degenerative changes of the bilateral hips and lower lumbar spine. RECOMMENDATION:   If patient is persistently symptomatic, given degree of osteopenia, consider   further evaluation with MRI. Assessment/Plan:    Active Hospital Problems    Diagnosis Date Noted    Hip pain [M25.559] 11/24/2018    ESRD (end stage renal disease) on dialysis ([facility] [address] [N18.6, Z99.2] 09/03/2018    Paroxysmal atrial fibrillation (HCC) [I48.0]     Coronary artery disease involving native coronary artery of native heart with unstable angina pectoris ([facility]) [I25.110]     Type 2 diabetes mellitus, with long-term current use of insulin ([facility]) [E11.9, Z79.4]     Essential hypertension [I10]      Left hip leg pain s/p fall causing inability to ambulate and preform ADL's   - Admitted for pain control, evaluation and PT/OT   - CT images of the hip and lumbar spine are w/o acute processes   - Unable to have MRI as has pacemaker  - cont pain and spasm medications    ESRD- HD   - [place] balances,monitor   - Nephrology consulted     DM- controlled  - FSBS achs with SSI   - Lantus stopped on last admission ( 9/5/18 )  2/2 to no hospital requirements needed     HTN- controlled  - cont hydralazine, losartan    CAD- [facility] 8/19 to 8/29 2018, here with NSTEMI, underwent rotablator assisted PCI to prox and distal RCA, prox PDA 3 SHYANNE placed at that time. - cont plavix, statin and ARB.  NO DAPT since she is on coumadin and plavix     PAF- currently NSR  - AC with plavix and coumadin   - pacer placed 8/27   - pharm to dose coumadin   - stopped flecainide as pt no longer on         DVT Prophylaxis: coumadin   Diet: DIET LOW SODIUM 2 GM;  Code Status: Full Code    PT/OT Eval Status: E/T     Dispo - will likely need SNF     Shavon German

## 2018-11-25 NOTE — H&P
Hospital Medicine History & Physical      PCP: Ewelina Keane MD    Date of Admission: 11/24/2018    Date of Service: Pt seen/examined on 11/24/18 and Admitted to Inpatient with expected LOS greater than two midnights due to medical therapy. Chief Complaint:  weakness      History Of Present Illness:    [de-identified] y.o. female who presented to [facility] with increased weakness. Pt fell. She has had pain in left hip. Was unable to ambulate. Family decided to bring her in for care. Pt has had several falls at home  No cp. No sob. No head trauma. Past Medical History:          Diagnosis Date    A-fib [facility])     CAD (coronary artery disease) 08/2018    High grade stenosis X 2 vessels    Cardiomyopathy ([facility] 08/2018    EF= 40-45%    Diabetes mellitus ([facility]     ESRD (end stage renal disease) on dialysis ([facility] 08/2018    MWF    GERD (gastroesophageal reflux disease)     High cholesterol     Hypertension     MI (myocardial infarction) ([facility] [facility] 08/2018    Thyroid disease     thyroid removed. Past Surgical History:          Procedure Laterality Date    CARDIAC CATHETERIZATION  08/13/2018    3 Vessel coronary disease, referred to CABG    CHOLECYSTECTOMY      CORONARY ANGIOPLASTY WITH STENT PLACEMENT      DIALYSIS FISTULA CREATION      left upper arm    EYE SURGERY      cataracts, retinal detatched, fluid removal    PACEMAKER INSERTION  08/27/2018    Dual Chamber Pacemaker Insertion    PTCA  08/15/2018    SHYANNE - 2.75 x 16 to pPDA, 3.5 x 28 to dRCA and 3.5 x 20 mRCA    PTCA  08/20/2018    SHYANNE- 3.5 x 16 to mCx and 3.5 x 16 to pLAD    THYROID SURGERY         Medications Prior to Admission:      Prior to Admission medications    Medication Sig Start Date End Date Taking?  Authorizing Provider   metoprolol succinate (TOPROL XL) 25 MG extended release tablet Take 1 tablet by mouth daily 8/30/18 9/29/18  Graham Gupta MD   aspirin 81 MG EC tablet Take 1 tablet by mouth daily for 10 days 8/22/18 9/1/18  Samir Hung full range of motion. No jugular venous distention. Trachea midline. Respiratory:  Normal respiratory effort. Clear to auscultation, bilaterally without Rales/Wheezes/Rhonchi. Cardiovascular:  Regular rate and rhythm with normal S1/S2 without murmurs, rubs or gallops. Abdomen: Soft, non-tender, non-distended with normal bowel sounds. Musculoskeletal:  Pain in left hip with movement  Skin: Skin color, texture, turgor normal.  No rashes or lesions. Neurologic:  Neurovascularly intact without any focal sensory/motor deficits. Cranial nerves: II-XII intact, grossly non-focal.  Psychiatric:  Alert and oriented, thought content appropriate, normal insight  Capillary Refill: Brisk,< 3 seconds   Peripheral Pulses: +2 palpable, equal bilaterally       Labs:     Recent Labs      11/24/18   1720   WBC  9.5   HGB  10.6*   HCT  32.4*   PLT  167     Recent Labs      11/24/18   1720   NA  139   K  4.5   CL  100   CO2  26   BUN  23*   CREATININE  3.7*   CALCIUM  8.5     Recent Labs      11/24/18   1720   AST  33   ALT  16   BILITOT  0.6   ALKPHOS  111     Recent Labs      11/24/18   1720   INR  2.05*     No results for input(s): Amedeo Elizabeth in the last 72 hours. Urinalysis:      Lab Results   Component Value Date    NITRU Negative 04/12/2015    WBCUA 3-5 04/12/2015    BACTERIA Rare 04/12/2015    RBCUA 20-50 04/12/2015    BLOODU LARGE 04/12/2015    SPECGRAV 1.015 04/12/2015    GLUCOSEU Negative 04/12/2015       Radiology:         CT LUMBAR SPINE WO CONTRAST   Preliminary Result   No acute fracture, or traumatic subluxation. Moderate partially imaged left pleural effusion. Trace right pleural   effusion. Extensive vascular calcifications. CT HIP LEFT WO CONTRAST   Final Result   No acute osseous abnormality identified. XR CHEST PORTABLE   Preliminary Result   Unchanged left pleural effusion and left lung base pulmonary opacity when   compared to the previous chest CT.       Right lung remains

## 2018-11-25 NOTE — PROGRESS NOTES
Patient assessment complete and documented. VSS. A&O x4. Patient with complaints of pain on Left hip. Patient fell at home thanksgiving day. Patient son at bedside. Oriented patient to use of call light and room. Bed in lowest locked position with call light within reach. Will continue to monitor.

## 2018-11-25 NOTE — PLAN OF CARE
Problem: Falls - Risk of:  Goal: Will remain free from falls  Will remain free from falls   Outcome: Ongoing  Patient oriented to room and call light. Explained to patient to call out if in need of any assistance. Bed in lowest locked position with bed alarm and call light within reach. Will continue to monitor.

## 2018-11-25 NOTE — PROGRESS NOTES
15-20 reps w/o fatigue  Patient Goals   Patient goals : \"go to assisted living\"       Therapy Time   Individual Concurrent Group Co-treatment   Time In 5268         Time Out 1205         Minutes 20         Timed Code Treatment Minutes: 275 [facility],

## 2018-11-25 NOTE — PROGRESS NOTES
bedroom/bathroom  Additional Comments: pt groggy/lethargic, unable to obtain hx from pt  Cognition        Objective             Strength RLE  Comment: grossly 2/5. limited by pain and lethargy   Strength LLE  Comment: grossly 2/5. limited by pain and lethargy         Bed mobility  Supine to Sit: Dependent/Total  Sit to Supine: Dependent/Total  Comment: total A x 2   Transfers  Sit to Stand: Unable to assess  Comment: pt unable to obtain midline position seated EOB, transfers deferred due to safety concerns   Ambulation  Ambulation?: No (deferred due to safety concners)     Balance  Sitting - Static: Poor  Sitting - Dynamic: Poor  Comments: pt sat EOB x 5-8 min with BUE support, significant R lateral lean, unable to obtain midline position depsite max A. Assessment   Body structures, Functions, Activity limitations: Decreased functional mobility ; Decreased safe awareness;Decreased balance;Decreased ROM; Decreased endurance;Decreased strength  Assessment: pt is [de-identified]year old female admitted with dx of fall resulting in L hippain. CT (-) for fx. pt previoulsy ind wiht funcitona lmobility per pt report, however need to confirm with family (pt unreliable historain this date). per chiart review, pt family looking into assisted living. pt currently requires max A of 2 for bed mobility, unable to tolerate transfers. At thsi time, recommedn pt d/c SNF for continued skilled therapy to improve ind and safety with functional mobility. Treatment Diagnosis:  impaired transfers   Prognosis: Fair  Decision Making: Medium Complexity  Patient Education: role of PT, bed mobility, transfers, poc, d/c rec   REQUIRES PT FOLLOW UP: Yes  Activity Tolerance  Activity Tolerance: Patient limited by pain; Patient limited by endurance  Activity Tolerance: pt limited by increased lethargy and drowsiness 2* pain medication         Plan   Plan  Times per week: 3-5x/week  Times per day: Daily  Current Treatment Recommendations: Strengthening,

## 2018-11-26 LAB
ALBUMIN SERPL-MCNC: 2.3 G/DL (ref 3.4–5)
ANION GAP SERPL CALCULATED.3IONS-SCNC: 12 MMOL/L (ref 3–16)
BASOPHILS ABSOLUTE: 0.1 K/UL (ref 0–0.2)
BASOPHILS RELATIVE PERCENT: 0.6 %
BUN BLDV-MCNC: 39 MG/DL (ref 7–20)
CALCIUM SERPL-MCNC: 8.2 MG/DL (ref 8.3–10.6)
CHLORIDE BLD-SCNC: 99 MMOL/L (ref 99–110)
CO2: 25 MMOL/L (ref 21–32)
CREAT SERPL-MCNC: 5.5 MG/DL (ref 0.6–1.2)
EOSINOPHILS ABSOLUTE: 0 K/UL (ref 0–0.6)
EOSINOPHILS RELATIVE PERCENT: 0.1 %
GFR AFRICAN AMERICAN: 9
GFR NON-AFRICAN AMERICAN: 7
GLUCOSE BLD-MCNC: 94 MG/DL (ref 70–99)
HCT VFR BLD CALC: 29.5 % (ref 36–48)
HEMOGLOBIN: 9.5 G/DL (ref 12–16)
INR BLD: 3.02 (ref 0.86–1.14)
LYMPHOCYTES ABSOLUTE: 0.2 K/UL (ref 1–5.1)
LYMPHOCYTES RELATIVE PERCENT: 1.5 %
MCH RBC QN AUTO: 30.2 PG (ref 26–34)
MCHC RBC AUTO-ENTMCNC: 32.1 G/DL (ref 31–36)
MCV RBC AUTO: 94.1 FL (ref 80–100)
MONOCYTES ABSOLUTE: 1.2 K/UL (ref 0–1.3)
MONOCYTES RELATIVE PERCENT: 10.5 %
NEUTROPHILS ABSOLUTE: 9.8 K/UL (ref 1.7–7.7)
NEUTROPHILS RELATIVE PERCENT: 87.3 %
PDW BLD-RTO: 16.4 % (ref 12.4–15.4)
PHOSPHORUS: 3.6 MG/DL (ref 2.5–4.9)
PLATELET # BLD: 151 K/UL (ref 135–450)
PMV BLD AUTO: 8.5 FL (ref 5–10.5)
POTASSIUM SERPL-SCNC: 5 MMOL/L (ref 3.5–5.1)
PROTHROMBIN TIME: 34.4 SEC (ref 9.8–13)
RBC # BLD: 3.14 M/UL (ref 4–5.2)
SODIUM BLD-SCNC: 136 MMOL/L (ref 136–145)
WBC # BLD: 11.2 K/UL (ref 4–11)

## 2018-11-26 PROCEDURE — 1200000000 HC SEMI PRIVATE

## 2018-11-26 PROCEDURE — 85610 PROTHROMBIN TIME: CPT

## 2018-11-26 PROCEDURE — 2700000000 HC OXYGEN THERAPY PER DAY

## 2018-11-26 PROCEDURE — 6370000000 HC RX 637 (ALT 250 FOR IP): Performed by: NURSE PRACTITIONER

## 2018-11-26 PROCEDURE — 6360000002 HC RX W HCPCS

## 2018-11-26 PROCEDURE — 90937 HEMODIALYSIS REPEATED EVAL: CPT

## 2018-11-26 PROCEDURE — 80069 RENAL FUNCTION PANEL: CPT

## 2018-11-26 PROCEDURE — 85025 COMPLETE CBC W/AUTO DIFF WBC: CPT

## 2018-11-26 PROCEDURE — 6360000002 HC RX W HCPCS: Performed by: INTERNAL MEDICINE

## 2018-11-26 PROCEDURE — 36415 COLL VENOUS BLD VENIPUNCTURE: CPT

## 2018-11-26 PROCEDURE — 6370000000 HC RX 637 (ALT 250 FOR IP): Performed by: INTERNAL MEDICINE

## 2018-11-26 PROCEDURE — 5A1D70Z PERFORMANCE OF URINARY FILTRATION, INTERMITTENT, LESS THAN 6 HOURS PER DAY: ICD-10-PCS | Performed by: INTERNAL MEDICINE

## 2018-11-26 PROCEDURE — 94761 N-INVAS EAR/PLS OXIMETRY MLT: CPT

## 2018-11-26 PROCEDURE — 2580000003 HC RX 258: Performed by: INTERNAL MEDICINE

## 2018-11-26 RX ADMIN — AMLODIPINE BESYLATE 10 MG: 5 TABLET ORAL at 19:40

## 2018-11-26 RX ADMIN — CLOPIDOGREL BISULFATE 75 MG: 75 TABLET ORAL at 12:50

## 2018-11-26 RX ADMIN — CINACALCET HYDROCHLORIDE 30 MG: 30 TABLET, COATED ORAL at 13:04

## 2018-11-26 RX ADMIN — SODIUM CHLORIDE, PRESERVATIVE FREE 10 ML: 5 INJECTION INTRAVENOUS at 19:40

## 2018-11-26 RX ADMIN — OXYCODONE HYDROCHLORIDE 5 MG: 5 TABLET ORAL at 13:01

## 2018-11-26 RX ADMIN — ATORVASTATIN CALCIUM 80 MG: 80 TABLET, FILM COATED ORAL at 12:51

## 2018-11-26 RX ADMIN — PANTOPRAZOLE SODIUM 20 MG: 20 TABLET, DELAYED RELEASE ORAL at 13:01

## 2018-11-26 RX ADMIN — HYDRALAZINE HYDROCHLORIDE 50 MG: 25 TABLET, FILM COATED ORAL at 12:51

## 2018-11-26 RX ADMIN — METOPROLOL SUCCINATE 25 MG: 25 TABLET, EXTENDED RELEASE ORAL at 12:50

## 2018-11-26 RX ADMIN — SODIUM CHLORIDE, PRESERVATIVE FREE 10 ML: 5 INJECTION INTRAVENOUS at 12:50

## 2018-11-26 RX ADMIN — DARBEPOETIN ALFA 6.5 MCG: 25 INJECTION, SOLUTION INTRAVENOUS; SUBCUTANEOUS at 11:19

## 2018-11-26 RX ADMIN — LOSARTAN POTASSIUM 25 MG: 25 TABLET, FILM COATED ORAL at 12:50

## 2018-11-26 RX ADMIN — HYDRALAZINE HYDROCHLORIDE 50 MG: 25 TABLET, FILM COATED ORAL at 19:40

## 2018-11-26 RX ADMIN — DARBEPOETIN ALFA 6.5 MCG: 25 INJECTION, SOLUTION INTRAVENOUS; SUBCUTANEOUS at 11:16

## 2018-11-26 ASSESSMENT — PAIN DESCRIPTION - PAIN TYPE
TYPE: ACUTE PAIN
TYPE: ACUTE PAIN

## 2018-11-26 ASSESSMENT — PAIN SCALES - GENERAL
PAINLEVEL_OUTOF10: 0
PAINLEVEL_OUTOF10: 1
PAINLEVEL_OUTOF10: 4
PAINLEVEL_OUTOF10: 0
PAINLEVEL_OUTOF10: 0

## 2018-11-26 ASSESSMENT — PAIN DESCRIPTION - LOCATION: LOCATION: BUTTOCKS

## 2018-11-26 ASSESSMENT — PAIN DESCRIPTION - ORIENTATION: ORIENTATION: LEFT

## 2018-11-26 NOTE — CARE COORDINATION
Lacey received call from [facility] and pt can go to facility upon discharge. Lacey called Libby Weaver on this day to inform her that discharge could likely be tomorrow. Lacey informed pt's sons Rachana Gannon and Jonas Bradshaw via phone call at [phone]. They are in agreement with pt going to [facility] for skilled care and are actively seeking Assisted living for options after completing skilled care.

## 2018-11-26 NOTE — FLOWSHEET NOTE
Treatment time: 3 hours  Net UF: 600 ml     Pre weight: 62.3 kg  Post weight:61.4 kg  EDW: 62 kg     Access used: L AVF    Access function: Well with  ml/min     Medications or blood products given: Aranesp     Regular outpatient schedule: HERMELINDA Aguilar     Summary of response to treatment: Well and without complications. 11/26/18 0727 11/26/18 1115   Vital Signs   BP (!) 120/54 128/74   Temp 98.7 °F (37.1 °C) 98.5 °F (36.9 °C)   Height 5' 3\" (1.6 m) 5' 3\" (1.6 m)   Weight 136 lb 11 oz (62 kg) 135 lb 5.8 oz (61.4 kg)   Weight Method Bed scale --    Percent Weight Change -11.24 -12.1   Dry Weight 136 lb 11 oz (62 kg) 136 lb 11 oz (62 kg)      Copy of dialysis treatment record placed in chart, to be scanned into EMR.

## 2018-11-27 VITALS
BODY MASS INDEX: 23.98 KG/M2 | WEIGHT: 135.36 LBS | TEMPERATURE: 98.3 F | HEIGHT: 63 IN | OXYGEN SATURATION: 93 % | HEART RATE: 71 BPM | RESPIRATION RATE: 18 BRPM | SYSTOLIC BLOOD PRESSURE: 106 MMHG | DIASTOLIC BLOOD PRESSURE: 63 MMHG

## 2018-11-27 LAB
ANION GAP SERPL CALCULATED.3IONS-SCNC: 11 MMOL/L (ref 3–16)
BUN BLDV-MCNC: 18 MG/DL (ref 7–20)
CALCIUM SERPL-MCNC: 8 MG/DL (ref 8.3–10.6)
CHLORIDE BLD-SCNC: 99 MMOL/L (ref 99–110)
CO2: 27 MMOL/L (ref 21–32)
CREAT SERPL-MCNC: 3.2 MG/DL (ref 0.6–1.2)
GFR AFRICAN AMERICAN: 17
GFR NON-AFRICAN AMERICAN: 14
GLUCOSE BLD-MCNC: 90 MG/DL (ref 70–99)
HCT VFR BLD CALC: 31.2 % (ref 36–48)
HEMOGLOBIN: 10.1 G/DL (ref 12–16)
INR BLD: 3.97 (ref 0.86–1.14)
MCH RBC QN AUTO: 30.2 PG (ref 26–34)
MCHC RBC AUTO-ENTMCNC: 32.3 G/DL (ref 31–36)
MCV RBC AUTO: 93.6 FL (ref 80–100)
PDW BLD-RTO: 16.1 % (ref 12.4–15.4)
PLATELET # BLD: 174 K/UL (ref 135–450)
PMV BLD AUTO: 8.8 FL (ref 5–10.5)
POTASSIUM REFLEX MAGNESIUM: 4.1 MMOL/L (ref 3.5–5.1)
PROTHROMBIN TIME: 45.3 SEC (ref 9.8–13)
RBC # BLD: 3.33 M/UL (ref 4–5.2)
SODIUM BLD-SCNC: 137 MMOL/L (ref 136–145)
WBC # BLD: 10.9 K/UL (ref 4–11)

## 2018-11-27 PROCEDURE — 6370000000 HC RX 637 (ALT 250 FOR IP): Performed by: REGISTERED NURSE

## 2018-11-27 PROCEDURE — 6370000000 HC RX 637 (ALT 250 FOR IP): Performed by: NURSE PRACTITIONER

## 2018-11-27 PROCEDURE — 85027 COMPLETE CBC AUTOMATED: CPT

## 2018-11-27 PROCEDURE — 80048 BASIC METABOLIC PNL TOTAL CA: CPT

## 2018-11-27 PROCEDURE — 6370000000 HC RX 637 (ALT 250 FOR IP): Performed by: INTERNAL MEDICINE

## 2018-11-27 PROCEDURE — 2580000003 HC RX 258: Performed by: INTERNAL MEDICINE

## 2018-11-27 PROCEDURE — 36415 COLL VENOUS BLD VENIPUNCTURE: CPT

## 2018-11-27 PROCEDURE — 85610 PROTHROMBIN TIME: CPT

## 2018-11-27 RX ORDER — LIDOCAINE 4 G/G
1 PATCH TOPICAL DAILY
Qty: 30 PATCH | Refills: 0 | Status: SHIPPED | OUTPATIENT
Start: 2018-11-28

## 2018-11-27 RX ORDER — CASTOR OIL AND BALSAM, PERU 788; 87 MG/G; MG/G
OINTMENT TOPICAL 2 TIMES DAILY
Qty: 1 TUBE | Refills: 0 | Status: SHIPPED | OUTPATIENT
Start: 2018-11-27

## 2018-11-27 RX ORDER — OXYCODONE HYDROCHLORIDE 5 MG/1
5 TABLET ORAL EVERY 6 HOURS PRN
Qty: 28 TABLET | Refills: 0 | Status: SHIPPED | OUTPATIENT
Start: 2018-11-27 | End: 2018-12-04

## 2018-11-27 RX ORDER — WARFARIN SODIUM 2 MG/1
2 TABLET ORAL DAILY
Qty: 30 TABLET | Refills: 3 | Status: SHIPPED | OUTPATIENT
Start: 2018-11-30

## 2018-11-27 RX ORDER — CASTOR OIL AND BALSAM, PERU 788; 87 MG/G; MG/G
OINTMENT TOPICAL 2 TIMES DAILY
Status: DISCONTINUED | OUTPATIENT
Start: 2018-11-27 | End: 2018-11-27 | Stop reason: HOSPADM

## 2018-11-27 RX ADMIN — ATORVASTATIN CALCIUM 80 MG: 80 TABLET, FILM COATED ORAL at 10:11

## 2018-11-27 RX ADMIN — CLOPIDOGREL BISULFATE 75 MG: 75 TABLET ORAL at 10:11

## 2018-11-27 RX ADMIN — SODIUM CHLORIDE, PRESERVATIVE FREE 10 ML: 5 INJECTION INTRAVENOUS at 10:10

## 2018-11-27 RX ADMIN — OXYCODONE HYDROCHLORIDE 5 MG: 5 TABLET ORAL at 10:58

## 2018-11-27 RX ADMIN — CINACALCET HYDROCHLORIDE 30 MG: 30 TABLET, COATED ORAL at 10:11

## 2018-11-27 RX ADMIN — METOPROLOL SUCCINATE 25 MG: 25 TABLET, EXTENDED RELEASE ORAL at 10:11

## 2018-11-27 RX ADMIN — PANTOPRAZOLE SODIUM 20 MG: 20 TABLET, DELAYED RELEASE ORAL at 10:11

## 2018-11-27 RX ADMIN — CASTOR OIL AND BALSAM, PERU: 788; 87 OINTMENT TOPICAL at 15:34

## 2018-11-27 ASSESSMENT — PAIN SCALES - GENERAL: PAINLEVEL_OUTOF10: 6

## 2018-11-27 NOTE — PLAN OF CARE
Problem: Falls - Risk of:  Goal: Will remain free from falls  Will remain free from falls   Outcome: Ongoing      Problem: Pain:  Goal: Pain level will decrease  Pain level will decrease   Outcome: Ongoing      Problem: Risk for Impaired Skin Integrity  Goal: Tissue integrity - skin and mucous membranes  Structural intactness and normal physiological function of skin and  mucous membranes. Outcome: Ongoing  Pt turned Q 2hrs with pillow and wedge support. Boots on bilateral legs.

## 2018-11-27 NOTE — PROGRESS NOTES
[facility]       Progress Note           Subjective/   [de-identified]y.o. year old female who we are seeing in consultation for ESRD. HPI:  Last HD on 11/26 with 600 ml removed, post-weight of 61.4 kg. No fevers. ROS:  No sob, still weak.     Objective/   GEN:  Chronically ill, /63   Pulse 60   Temp 98 °F (36.7 °C) (Oral)   Resp 18   Ht 5' 3\" (1.6 m)   Wt 135 lb 5.8 oz (61.4 kg)   SpO2 92%   BMI 23.98 kg/m²   HEENT: non-icteric, no JVD  CV: S1, S2 without m/r/g; no edema  RESP: CTA B without w/r/r; breathing wnl  ABD: +bs, soft, nt, no hsm  SKIN: warm, no rashes  ACCESS: ADRYE AVF    Data/  Recent Labs      11/25/18   0521  11/26/18   0535  11/27/18   0530   WBC  9.9  11.2*  10.9   HGB  10.3*  9.5*  10.1*   HCT  31.7*  29.5*  31.2*   MCV  92.7  94.1  93.6   PLT  174  151  174     Recent Labs      11/24/18   1720  11/26/18   0535  11/27/18   0530   NA  139  136  137   K  4.5  5.0  4.1   CL  100  99  99   CO2  26  25  27   GLUCOSE  104*  94  90   PHOS   --   3.6   --    BUN  23*  39*  18   CREATININE  3.7*  5.5*  3.2*   LABGLOM  12*  7*  14*   GFRAA  14*  9*  17*       Assessment/     -ESRD on MWF   -Fall with decreased ability to ambulate  -Anemia, Hb target 10-12, weekly MAKAYLA with HD  -CKD-MBD   -HTN - accepetable    Plan/     - HD tomorrow - UF limited by cramping, below EDW of 62 kg  - Trend labs    Okay for discharge once disposition arranged

## 2018-11-27 NOTE — PROGRESS NOTES
Pharmacy to Dose Warfarin     Dx: Afib  Goal INR range 2-3   Home Warfarin dose: 2mg according to med list   INR= 2.04 on 11/24/18  Date                 INR                  Warfarin  11/24               2.04                     ??  11/25               -                         2 mg  11/26               3.02                    hold   11/27               3.97                    Hold   Recommend to hold warfarin until INR <=3. Daily INR ordered. Rx will continue to manage therapy per NP's consult order.   Negra Farris/Ivett. 11/27/18 8:51 AM

## 2018-11-27 NOTE — CONSULTS
Pharmacy to Dose Warfarin    Dx: Afib  Goal INR range 2-3   Home Warfarin dose: 2mg according to med list   INR= 2.04 on 11/24/18  Date  INR  Warfarin  11/25               -                       2 mg    Recommend Warfarin 2 mg tonight x1. Daily INR ordered. Rx will continue to manage therapy per NP's consult order.   Negra Farris/Ivett. 11/25/18 10:58 PM
detatched, fluid removal    PACEMAKER INSERTION  08/27/2018    Dual Chamber Pacemaker Insertion    PTCA  08/15/2018    SHYANNE - 2.75 x 16 to pPDA, 3.5 x 28 to dRCA and 3.5 x 20 mRCA    PTCA  08/20/2018    SHYANNE- 3.5 x 16 to mCx and 3.5 x 16 to pLAD    THYROID SURGERY         FAMILY HISTORY    History reviewed. No pertinent family history. SOCIAL HISTORY    Social History   Substance Use Topics    Smoking status: Never Smoker    Smokeless tobacco: Never Used    Alcohol use No      Comment: rarely       ALLERGIES    Allergies   Allergen Reactions    Seasonal Itching       MEDICATIONS    No current facility-administered medications on file prior to encounter. Current Outpatient Prescriptions on File Prior to Encounter   Medication Sig Dispense Refill    metoprolol succinate (TOPROL XL) 25 MG extended release tablet Take 1 tablet by mouth daily 30 tablet 0    aspirin 81 MG EC tablet Take 1 tablet by mouth daily for 10 days 10 tablet 0    clopidogrel (PLAVIX) 75 MG tablet Take 1 tablet by mouth daily 30 tablet 3    atorvastatin (LIPITOR) 80 MG tablet Take 1 tablet by mouth daily 30 tablet 3    losartan (COZAAR) 25 MG tablet Take 1 tablet by mouth daily 30 tablet 3    cinacalcet (SENSIPAR) 30 MG tablet Take 30 mg by mouth daily      hydrALAZINE (APRESOLINE) 25 MG tablet Take 50 mg by mouth 3 times daily 50 in am, 25 in afternoon and 50 in pm      sevelamer (RENVELA) 800 MG tablet Take 1 tablet by mouth 2 times daily (with meals)      flecainide (TAMBOCOR) 50 MG tablet Take 50 mg by mouth 2 times daily      B Complex-C-Folic Acid (FLAVIA CAPS PO) Take  by mouth daily.       amlodipine (NORVASC) 10 MG tablet Take 10 mg by mouth nightly       Omeprazole (PRILOSEC PO) Take 20 mg by mouth daily          Objective    /63   Pulse 60   Temp 98 °F (36.7 °C) (Oral)   Resp 18   Ht 5' 3\" (1.6 m)   Wt 135 lb 5.8 oz (61.4 kg)   SpO2 92%   BMI 23.98 kg/m²     LABS:  WBC:    Lab Results   Component Value
Rom, muscular strength intact; digits, nails normal    Data/  Recent Labs      11/24/18   1720  11/25/18   0521   WBC  9.5  9.9   HGB  10.6*  10.3*   HCT  32.4*  31.7*   MCV  93.3  92.7   PLT  167  174     Recent Labs      11/24/18   1720   NA  139   K  4.5   CL  100   CO2  26   GLUCOSE  104*   BUN  23*   CREATININE  3.7*   LABGLOM  12*   GFRAA  14*     CT hip and lumbar spine were all normal     Assessment  -ESRD on MWF   -Fall with decreased ability to ambulate  -Anemia, hb at goal  -CKD-MBD  -HTN   -BP accepetable    Plan  -HD per MWF schedule  -add phos to labs  -renal diet   -pain control and PT     Thank you for the consultation. Please do not hesitate to call with questions.     Libby Kamara  [facility]  Office: [phone]  Fax:    [phone]

## 2018-11-28 LAB
EKG ATRIAL RATE: 70 BPM
EKG DIAGNOSIS: NORMAL
EKG P AXIS: 30 DEGREES
EKG P-R INTERVAL: 202 MS
EKG Q-T INTERVAL: 434 MS
EKG QRS DURATION: 104 MS
EKG QTC CALCULATION (BAZETT): 468 MS
EKG R AXIS: -24 DEGREES
EKG T AXIS: 146 DEGREES
EKG VENTRICULAR RATE: 70 BPM